# Patient Record
Sex: MALE | Race: WHITE | URBAN - METROPOLITAN AREA
[De-identification: names, ages, dates, MRNs, and addresses within clinical notes are randomized per-mention and may not be internally consistent; named-entity substitution may affect disease eponyms.]

---

## 2017-01-18 ENCOUNTER — OFFICE VISIT (OUTPATIENT)
Dept: FAMILY MEDICINE | Facility: CLINIC | Age: 82
End: 2017-01-18

## 2017-01-18 VITALS
HEART RATE: 83 BPM | WEIGHT: 151 LBS | DIASTOLIC BLOOD PRESSURE: 77 MMHG | OXYGEN SATURATION: 96 % | TEMPERATURE: 97.4 F | BODY MASS INDEX: 24.27 KG/M2 | SYSTOLIC BLOOD PRESSURE: 122 MMHG | RESPIRATION RATE: 18 BRPM

## 2017-01-18 DIAGNOSIS — F02.80 LATE ONSET ALZHEIMER'S DISEASE WITHOUT BEHAVIORAL DISTURBANCE (H): ICD-10-CM

## 2017-01-18 DIAGNOSIS — I10 ESSENTIAL HYPERTENSION: Primary | ICD-10-CM

## 2017-01-18 DIAGNOSIS — G30.1 LATE ONSET ALZHEIMER'S DISEASE WITHOUT BEHAVIORAL DISTURBANCE (H): ICD-10-CM

## 2017-01-18 DIAGNOSIS — E11.9 TYPE 2 DIABETES MELLITUS WITHOUT COMPLICATION, WITHOUT LONG-TERM CURRENT USE OF INSULIN (H): ICD-10-CM

## 2017-01-18 LAB
ANION GAP SERPL CALCULATED.3IONS-SCNC: 10 MMOL/L (ref 3–14)
BUN SERPL-MCNC: 21 MG/DL (ref 7–30)
CALCIUM SERPL-MCNC: 10 MG/DL (ref 8.5–10.1)
CHLORIDE SERPL-SCNC: 104 MMOL/L (ref 94–109)
CHOLEST SERPL-MCNC: 140.7 MG/DL (ref 0–200)
CHOLEST/HDLC SERPL: 3.7 {RATIO} (ref 0–5)
CO2 SERPL-SCNC: 27 MMOL/L (ref 20–32)
CREAT SERPL-MCNC: 0.76 MG/DL (ref 0.66–1.25)
CREAT UR-MCNC: 132 MG/DL
GFR SERPL CREATININE-BSD FRML MDRD: ABNORMAL ML/MIN/1.7M2
GLUCOSE SERPL-MCNC: 108 MG/DL (ref 70–99)
HBA1C MFR BLD: 6.4 % (ref 4.3–6)
HDLC SERPL-MCNC: 37.6 MG/DL
LDLC SERPL CALC-MCNC: 77 MG/DL (ref 0–129)
MICROALBUMIN UR-MCNC: 24 MG/L
MICROALBUMIN/CREAT UR: 18.33 MG/G CR (ref 0–17)
POTASSIUM SERPL-SCNC: 3.9 MMOL/L (ref 3.4–5.3)
SODIUM SERPL-SCNC: 141 MMOL/L (ref 133–144)
TRIGL SERPL-MCNC: 130.3 MG/DL (ref 0–150)
VLDL CHOLESTEROL: 26.1 MG/DL (ref 7–32)

## 2017-01-18 ASSESSMENT — ENCOUNTER SYMPTOMS
CONFUSION: 1
NERVOUS/ANXIOUS: 0
DYSPHORIC MOOD: 0
GASTROINTESTINAL NEGATIVE: 1
AGITATION: 0
UNEXPECTED WEIGHT CHANGE: 0
DECREASED CONCENTRATION: 0
RESPIRATORY NEGATIVE: 1
FATIGUE: 0
FEVER: 0
APPETITE CHANGE: 0
MYALGIAS: 0
ARTHRALGIAS: 1
CARDIOVASCULAR NEGATIVE: 1
ACTIVITY CHANGE: 0
WEAKNESS: 0

## 2017-01-19 NOTE — PROGRESS NOTES
HPI:       Nick Alegria is a 81 year old who presents for the following  Patient presents with:  Diabetes: F/u    F/u DM, HTN, AD.  Doing well.  Staying active.  C/o varying temperatures in feet -- they are cold most of the time but then he puts on 2 pairs of socks and goes outside to walk; then they become uncomfortably warm.  No burning in his feet at night.  No electric sensations or shooting pains.  Wife states he is doing well.         Problem, Medication and Allergy Lists were reviewed and are current.  Patient is an established patient of this clinic.         Review of Systems:   Review of Systems   Constitutional: Negative for fever, activity change, appetite change, fatigue and unexpected weight change.   Respiratory: Negative.    Cardiovascular: Negative.    Gastrointestinal: Negative.    Endocrine: Negative for heat intolerance and polyuria.   Musculoskeletal: Positive for arthralgias (foot pain). Negative for myalgias and gait problem.   Neurological: Negative for weakness.   Psychiatric/Behavioral: Positive for confusion. Negative for behavioral problems, dysphoric mood, decreased concentration and agitation. The patient is not nervous/anxious.              Physical Exam:   Patient Vitals for the past 24 hrs:   BP Temp Temp src Pulse Resp SpO2 Weight   01/18/17 1419 122/77 mmHg 97.4  F (36.3  C) Oral 83 18 96 % 151 lb (68.493 kg)     Body mass index is 24.27 kg/(m^2).  Vitals were reviewed and were normal     Physical Exam   Constitutional: He appears well-developed and well-nourished.   Neck: No thyromegaly present.   Cardiovascular: Normal rate, regular rhythm, normal heart sounds and intact distal pulses.    Pulmonary/Chest: Effort normal and breath sounds normal.   Musculoskeletal: He exhibits no edema.   Feet with onychomycosis, mild flaking distally, strong d pedis pulses.   Lymphadenopathy:     He has no cervical adenopathy.   Neurological: He is alert.   Skin: Skin is warm and dry.    Psychiatric: He has a normal mood and affect. His behavior is normal. Judgment and thought content normal.   Nursing note and vitals reviewed.        Results:     Results from last visit:  Office Visit on 08/31/2016   Component Date Value Ref Range Status     Specific Gravity Urine 08/31/2016 1.020  1.005 - 1.030 Final     pH Urine 08/31/2016 7.5  4.5 - 8.0 Final     Leukocyte Esterase UR 08/31/2016 Negative  NEGATIVE Final     Nitrite Urine 08/31/2016 Negative  NEGATIVE Final     Protein UR 08/31/2016 Negative  NEGATIVE Final     Glucose Urine 08/31/2016 Negative  NEGATIVE Final     Ketones Urine 08/31/2016 Negative  NEGATIVE Final     Urobilinogen mg/dL 08/31/2016 0.2 E.U./dL  0.2 E.U./dL Final     Bilirubin UR 08/31/2016 Negative  NEGATIVE Final     Blood UR 08/31/2016 Negative  NEGATIVE Final     Assessment and Plan     Nick was seen today for diabetes.    Diagnoses and all orders for this visit:    Essential hypertension - at goal continue Rx  -     Lipid Panel ([facility])    Type 2 diabetes mellitus without complication, without long-term current use of insulin (H) - well controlled, due for routine labs  -     Hemoglobin A1c  -     Basic metabolic panel  -     Albumin Random Urine Quantitative    Late onset Alzheimer's disease without behavioral disturbance - all functional needs met.  Continue donepezil.      There are no discontinued medications.  Options for treatment and follow-up care were reviewed with the patient. Nick Airam  engaged in the decision making process and verbalized understanding of the options discussed and agreed with the final plan.    Steffen Bingham MD

## 2017-01-20 DIAGNOSIS — E11.9 TYPE 2 DIABETES MELLITUS WITHOUT COMPLICATION, WITHOUT LONG-TERM CURRENT USE OF INSULIN (H): Primary | ICD-10-CM

## 2017-01-20 DIAGNOSIS — E11.9 TYPE 2 DIABETES MELLITUS WITHOUT COMPLICATION (H): ICD-10-CM

## 2017-01-20 RX ORDER — ATORVASTATIN CALCIUM 40 MG/1
40 TABLET, FILM COATED ORAL DAILY
Qty: 90 TABLET | Refills: 3 | Status: SHIPPED | OUTPATIENT
Start: 2017-01-20 | End: 2018-02-23

## 2017-01-20 NOTE — TELEPHONE ENCOUNTER
Request for medication refill:    Date of last visit at clinic: 01/18/17    Please complete refill if appropriate and CLOSE ENCOUNTER.    Closing the encounter signifies the refill is complete.    If refill has been denied, please complete the smart phrase .smirefuse and route it to the [facility] RN TRIAGE pool to inform the patient and the pharmacy.    Susie Reyes

## 2017-01-30 ENCOUNTER — TELEPHONE (OUTPATIENT)
Dept: FAMILY MEDICINE | Facility: CLINIC | Age: 82
End: 2017-01-30

## 2017-01-30 NOTE — TELEPHONE ENCOUNTER
[facility] phone call message- patient requesting a refill:    Full Medication Name: atorvastatin (LIPITOR) 40 MG tablet      [facility] - [place], [place] - [address] AT [facility]  [address]  [place] [address]  Phone: [phone] Fax: [phone]  : Yes    Additional Comments:  Patient is requesting refill of above medication.    OK to leave a message on voice mail? Yes    Primary language: English      needed? No    Call taken on January 30, 2017 at 11:50 AM by Hiral Vazquez

## 2017-02-02 NOTE — TELEPHONE ENCOUNTER
I called the pharmacy and spoke to the pharmacist to fill the Atorvistatin with refills.  They have the patient has Jaydon under the Medicare.

## 2017-02-06 ENCOUNTER — MYC MEDICAL ADVICE (OUTPATIENT)
Dept: FAMILY MEDICINE | Facility: CLINIC | Age: 82
End: 2017-02-06

## 2017-02-08 ENCOUNTER — OFFICE VISIT (OUTPATIENT)
Dept: FAMILY MEDICINE | Facility: CLINIC | Age: 82
End: 2017-02-08

## 2017-02-08 VITALS
HEART RATE: 78 BPM | BODY MASS INDEX: 23.79 KG/M2 | DIASTOLIC BLOOD PRESSURE: 77 MMHG | SYSTOLIC BLOOD PRESSURE: 128 MMHG | OXYGEN SATURATION: 97 % | WEIGHT: 148 LBS | RESPIRATION RATE: 18 BRPM | TEMPERATURE: 97.6 F

## 2017-02-08 DIAGNOSIS — F43.21 ADJUSTMENT DISORDER WITH DEPRESSED MOOD: Primary | ICD-10-CM

## 2017-02-08 ASSESSMENT — ENCOUNTER SYMPTOMS
RESPIRATORY NEGATIVE: 1
CONSTITUTIONAL NEGATIVE: 1
CARDIOVASCULAR NEGATIVE: 1

## 2017-02-08 NOTE — MR AVS SNAPSHOT
After Visit Summary   2/8/2017    Nick Alegria    MRN: [id number]           Patient Information     Date Of Birth          1935        Visit Information        Provider Department      2/8/2017 12:50 PM Steffen Bingham MD [facility]        Today's Diagnoses     Adjustment disorder with depressed mood    -  1       Care Instructions    1.  Continue exercising -- 4 times a week.    2.  Enjoy the trip.            Follow-ups after your visit        Follow-up notes from your care team     Return in about 5 weeks (around 3/15/2017).      Who to contact     Please call your clinic at [phone] to:    Ask questions about your health    Make or cancel appointments    Discuss your medicines    Learn about your test results    Speak to your doctor   If you have compliments or concerns about an experience at your clinic, or if you wish to file a complaint, please contact [facility] Patient Relations at [phone] or email us at [email or web address]         Additional Information About Your Visit        MyChart Information     [employer] gives you secure access to your electronic health record. If you see a primary care provider, you can also send messages to your care team and make appointments. If you have questions, please call your primary care clinic.  If you do not have a primary care provider, please call [phone] and they will assist you.      [employer] is an electronic gateway that provides easy, online access to your medical records. With [employer], you can request a clinic appointment, read your test results, renew a prescription or communicate with your care team.     To access your existing account, please contact your [facility] or call [phone] for assistance.        Care EveryWhere ID     This is your Care EveryWhere ID. This could be used by other organizations to access your [facility]  records  [id number]        Your Vitals Were     Pulse Temperature Respirations Pulse Oximetry          78 97.6  F (36.4  C) (Oral) 18 97%         Blood Pressure from Last 3 Encounters:   02/08/17 128/77   01/18/17 122/77   10/26/16 122/71    Weight from Last 3 Encounters:   02/08/17 148 lb (67.132 kg)   01/18/17 151 lb (68.493 kg)   10/31/16 150 lb 9.6 oz (68.312 kg)              Today, you had the following     No orders found for display       Primary Care Provider Office Phone # Fax #    Steffen Bingham [phone]        [facility] [address] [facility] [address]  [facility] [address]        Thank you!     Thank you for choosing [facility]  for your care. Our goal is always to provide you with excellent care. Hearing back from our patients is one way we can continue to improve our services. Please take a few minutes to complete the written survey that you may receive in the mail after your visit with us. Thank you!             Your Updated Medication List - Protect others around you: Learn how to safely use, store and throw away your medicines at www.disposemymeds.org.          This list is accurate as of: 2/8/17  1:13 PM.  Always use your most recent med list.                   Brand Name Dispense Instructions for use    amLODIPine 10 MG tablet    NORVASC    90 tablet    Take 1 tablet (10 mg) by mouth daily       atorvastatin 40 MG tablet    LIPITOR    90 tablet    Take 1 tablet (40 mg) by mouth daily       B-D ULTRA-FINE 33 LANCETS Misc     100 each    Use 2-4 times daily as directed       Bacitracin-Polymyx-Balwinder-HC 1 % Oint     1 Tube    Apply to affected areas  daily with dressing changes       blood glucose monitoring test strip    LEE CONTOUR    100 each    Use to test blood sugars 3 times daily or as directed.       calcium carbonate 500 MG tablet    OS-PEDRO PABLO 500 mg [place]. [place]     Take 500 mg by mouth 2 times daily       ciclopirox 0.77 % cream    LOPROX    90 g    Apply  topically 2 times daily To feet and toenails.       donepezil 10 MG tablet    ARICEPT    90 tablet    Take 1 tablet (10 mg) by mouth At Bedtime       exenatide ER 2 MG recon vial kit susp for weekly inj    BYDUREON    3 kit    Inject 2 mg Subcutaneous once a week       latanoprost 0.005 % ophthalmic solution    XALATAN     1 drop At Bedtime       losartan 100 MG tablet    COZAAR    90 tablet    Take 1 tablet (100 mg) by mouth daily       metFORMIN 500 MG tablet    GLUCOPHAGE    180 tablet    Take 1 tablet (500 mg) by mouth 2 times daily (with meals)       MULTIVITAMIN PO          salicylic acid 17 % Liqd     1 Bottle    Externally apply 1 dose * topically daily To left foot wart.       tolnaftate 1 % Aerp    ANTIFUNGAL SPRAY POWDER    130 g    Apply to feet twice daily       VITAMIN D3 PO      Take by mouth daily

## 2017-02-08 NOTE — PROGRESS NOTES
HPI:       Nick Alegria is a 81 year old who presents for the following  Patient presents with:  Depression: Follow Up    Depressed mood with some anger and grumpiness the past 4 wks.  Hasn't been a big problem in the past.  No h/o depression.  Was able to swim 2 days ago and mood is better since then.  He had been unable to swim for several weeks due to shoulder injury.         Problem, Medication and Allergy Lists were reviewed and are current.  Patient is an established patient of this clinic.         Review of Systems:   Review of Systems   Constitutional: Negative.    Respiratory: Negative.    Cardiovascular: Negative.              Physical Exam:   Patient Vitals for the past 24 hrs:   BP Temp Temp src Pulse Resp SpO2 Weight   02/08/17 1256 128/77 mmHg 97.6  F (36.4  C) Oral 78 18 97 % 148 lb (67.132 kg)     Body mass index is 23.79 kg/(m^2).  Vitals were reviewed and were normal     Physical Exam   Constitutional: He appears well-developed and well-nourished. No distress.   alert   Cardiovascular: Normal rate and regular rhythm.    Pulmonary/Chest: Effort normal and breath sounds normal. No respiratory distress.   Psychiatric: He has a normal mood and affect. His behavior is normal.         Results:       Assessment and Plan     Nick was seen today for depression.    Diagnoses and all orders for this visit:    Adjustment disorder with depressed mood - not quite a level to formally diagnose him.  Advised resumption of usual exercise.  Wife will monitor his mood and report back at next visit.  They are going on vacation next week in [place].      There are no discontinued medications.  Options for treatment and follow-up care were reviewed with the patient. Nikc Alegria  engaged in the decision making process and verbalized understanding of the options discussed and agreed with the final plan.    Steffen Bingham MD

## 2017-02-09 ASSESSMENT — PATIENT HEALTH QUESTIONNAIRE - PHQ9: SUM OF ALL RESPONSES TO PHQ QUESTIONS 1-9: 7

## 2017-03-22 ENCOUNTER — OFFICE VISIT (OUTPATIENT)
Dept: FAMILY MEDICINE | Facility: CLINIC | Age: 82
End: 2017-03-22

## 2017-03-22 VITALS
HEART RATE: 75 BPM | BODY MASS INDEX: 24.3 KG/M2 | TEMPERATURE: 97.4 F | OXYGEN SATURATION: 99 % | DIASTOLIC BLOOD PRESSURE: 76 MMHG | HEIGHT: 66 IN | SYSTOLIC BLOOD PRESSURE: 133 MMHG | RESPIRATION RATE: 18 BRPM | WEIGHT: 151.2 LBS

## 2017-03-22 DIAGNOSIS — E11.9 TYPE 2 DIABETES MELLITUS WITHOUT COMPLICATION, WITHOUT LONG-TERM CURRENT USE OF INSULIN (H): ICD-10-CM

## 2017-03-22 DIAGNOSIS — I10 ESSENTIAL HYPERTENSION: Primary | ICD-10-CM

## 2017-03-22 DIAGNOSIS — F02.80 LATE ONSET ALZHEIMER'S DISEASE WITHOUT BEHAVIORAL DISTURBANCE (H): ICD-10-CM

## 2017-03-22 DIAGNOSIS — G30.1 LATE ONSET ALZHEIMER'S DISEASE WITHOUT BEHAVIORAL DISTURBANCE (H): ICD-10-CM

## 2017-03-22 ASSESSMENT — ENCOUNTER SYMPTOMS
APPETITE CHANGE: 0
UNEXPECTED WEIGHT CHANGE: 0
DECREASED CONCENTRATION: 0
ARTHRALGIAS: 1
RESPIRATORY NEGATIVE: 1
NERVOUS/ANXIOUS: 0
AGITATION: 0
WEAKNESS: 0
GASTROINTESTINAL NEGATIVE: 1
FATIGUE: 0
ACTIVITY CHANGE: 0
CONFUSION: 1
FEVER: 0
DYSPHORIC MOOD: 0
CARDIOVASCULAR NEGATIVE: 1
MYALGIAS: 0

## 2017-03-22 NOTE — PROGRESS NOTES
"      HPI:       Jaydon Alegria is a 81 year old who presents for the following  Patient presents with:  RECHECK: Follow up, depression  Diabetes: pt has concerns about Blood sugar 90 -201    F/U AD, HTN, DM, depressed mood.  Just returned from vacation and feels great.  Thinks his mood is OK.  Wife feels it is OK as well.  His children are concerned that his memory is worse.    He has occasional lightheadedness when he gets up in the morning.  No falls.         Problem, Medication and Allergy Lists were reviewed and are current.  Patient is an established patient of this clinic.         Review of Systems:   Review of Systems   Constitutional: Negative for activity change, appetite change, fatigue, fever and unexpected weight change.   Respiratory: Negative.    Cardiovascular: Negative.    Gastrointestinal: Negative.    Endocrine: Negative for heat intolerance and polyuria.   Musculoskeletal: Positive for arthralgias (foot pain - \"hot\"). Negative for gait problem and myalgias.   Neurological: Negative for weakness.   Psychiatric/Behavioral: Positive for confusion. Negative for agitation, behavioral problems, decreased concentration and dysphoric mood. The patient is not nervous/anxious.              Physical Exam:   Patient Vitals for the past 24 hrs:   BP Temp Pulse Resp SpO2 Height Weight   03/22/17 1412 133/76 97.4  F (36.3  C) 75 18 99 % 5' 6.14\" (168 cm) 151 lb 3.2 oz (68.6 kg)     Body mass index is 24.3 kg/(m^2).  Vitals were reviewed and were normal     Physical Exam   Constitutional: He appears well-developed and well-nourished.   Neck: No thyromegaly present.   Cardiovascular: Normal rate, regular rhythm, normal heart sounds and intact distal pulses.    Pulmonary/Chest: Effort normal and breath sounds normal.   Musculoskeletal: He exhibits no edema.   Feet with onychomycosis, mild flaking distally, strong d pedis pulses.   Lymphadenopathy:     He has no cervical adenopathy.   Neurological: He " is alert.   Skin: Skin is warm and dry.   Psychiatric: He has a normal mood and affect. His behavior is normal. Judgment and thought content normal.   Nursing note and vitals reviewed.        Results:     Results from last visit:  Office Visit on 01/18/2017   Component Date Value Ref Range Status     Cholesterol 01/18/2017 140.7  0.0 - 200.0 mg/dL Final     Cholesterol/HDL Ratio 01/18/2017 3.7  0.0 - 5.0 Final     HDL Cholesterol 01/18/2017 37.6* >40.0 mg/dL Final     LDL Cholesterol Calculated 01/18/2017 77  0 - 129 mg/dL Final     Triglycerides 01/18/2017 130.3  0.0 - 150.0 mg/dL Final     VLDL Cholesterol 01/18/2017 26.1  7.0 - 32.0 mg/dL Final     Hemoglobin A1C 01/18/2017 6.4* 4.3 - 6.0 % Final     Sodium 01/18/2017 141  133 - 144 mmol/L Final     Potassium 01/18/2017 3.9  3.4 - 5.3 mmol/L Final     Chloride 01/18/2017 104  94 - 109 mmol/L Final     Carbon Dioxide 01/18/2017 27  20 - 32 mmol/L Final     Anion Gap 01/18/2017 10  3 - 14 mmol/L Final     Glucose 01/18/2017 108* 70 - 99 mg/dL Final     Urea Nitrogen 01/18/2017 21  7 - 30 mg/dL Final     Creatinine 01/18/2017 0.76  0.66 - 1.25 mg/dL Final     GFR Estimate 01/18/2017   >60 mL/min/1.7m2 Final                    Value:>90  Non  GFR Calc       GFR Estimate If Black 01/18/2017   >60 mL/min/1.7m2 Final                    Value:>90   GFR Calc       Calcium 01/18/2017 10.0  8.5 - 10.1 mg/dL Final     Creatinine Urine 01/18/2017 132  mg/dL Final     Albumin Urine mg/L 01/18/2017 24  mg/L Final     Albumin Urine mg/g Cr 01/18/2017 18.33* 0 - 17 mg/g Cr Final     Assessment and Plan     Jaydon Romero was seen today for recheck and diabetes.    Diagnoses and all orders for this visit:    Essential hypertension - at goal.  LIghtheadedness in the morning a possible indicator of overtreatment.  They will obtain a BP cuff and get some early morning readings.    Late onset Alzheimer's disease without behavioral disturbance - some  concern about deterioration from children.  Discussed adding memantine to regimen with pt and wife, they would like to think about it.    Type 2 diabetes mellitus without complication, without long-term current use of insulin (H) - stable, continue  Current Rx.      There are no discontinued medications.  Options for treatment and follow-up care were reviewed with the patient. Jaydon Alegria  engaged in the decision making process and verbalized understanding of the options discussed and agreed with the final plan.    Steffen Bingham MD

## 2017-03-22 NOTE — MR AVS SNAPSHOT
After Visit Summary   3/22/2017    Jaydon Alegria    MRN: [id number]           Patient Information     Date Of Birth          1935        Visit Information        Provider Department      3/22/2017 2:00 PM Steffen Bingham MD [facility]        Today's Diagnoses     Essential hypertension    -  1    Late onset Alzheimer's disease without behavioral disturbance        Type 2 diabetes mellitus without complication, without long-term current use of insulin (H)          Care Instructions    1.  Purchase BP machine.    2.  Take BP in the morning 3 times when you feel fine.  Write down the value.    3.  Take BP in the morning when you feel lightheaded and write it down.          Follow-ups after your visit        Follow-up notes from your care team     Return in about 4 weeks (around 4/19/2017).      Your next 10 appointments already scheduled     Mar 27, 2017  1:40 PM CDT   (Arrive by 1:25 PM)   RETURN FOOT/ANKLE with Garfield Fisher DPM   [facility] ([facility])    [address] [place] [id number] [phone]              Who to contact     Please call your clinic at [phone] to:    Ask questions about your health    Make or cancel appointments    Discuss your medicines    Learn about your test results    Speak to your doctor   If you have compliments or concerns about an experience at your clinic, or if you wish to file a complaint, please contact [facility] Patient Relations at [phone] or email us at [email or web address]         Additional Information About Your Visit        MyChart Information     [employer] gives you secure access to your electronic health record. If you see a primary care provider, you can also send messages to your care team and make appointments. If you have questions, please call your primary care clinic.  If you do not have a primary  "care provider, please call [phone] and they will assist you.      [employer] is an electronic gateway that provides easy, online access to your medical records. With [employer], you can request a clinic appointment, read your test results, renew a prescription or communicate with your care team.     To access your existing account, please contact your [facility] or call [phone] for assistance.        Care EveryWhere ID     This is your Care EveryWhere ID. This could be used by other organizations to access your [place] medical records  [id number]        Your Vitals Were     Pulse Temperature Respirations Height Pulse Oximetry BMI (Body Mass Index)    75 97.4  F (36.3  C) 18 5' 6.14\" (168 cm) 99% 24.3 kg/m2       Blood Pressure from Last 3 Encounters:   03/22/17 133/76   02/08/17 128/77   01/18/17 122/77    Weight from Last 3 Encounters:   03/22/17 151 lb 3.2 oz (68.6 kg)   02/08/17 148 lb (67.1 kg)   01/18/17 151 lb (68.5 kg)              Today, you had the following     No orders found for display       Primary Care Provider Office Phone # Fax #    Steffen Bingham [phone]        [facility] [address] [address]        Thank you!     Thank you for choosing [facility]  for your care. Our goal is always to provide you with excellent care. Hearing back from our patients is one way we can continue to improve our services. Please take a few minutes to complete the written survey that you may receive in the mail after your visit with us. Thank you!             Your Updated Medication List - Protect others around you: Learn how to safely use, store and throw away your medicines at www.disposemymeds.org.          This list is accurate as of: 3/22/17  2:56 PM.  Always use your most recent med list.                   Brand Name Dispense Instructions for use    amLODIPine 10 MG tablet    NORVASC    90 tablet    Take 1 tablet " (10 mg) by mouth daily       atorvastatin 40 MG tablet    LIPITOR    90 tablet    Take 1 tablet (40 mg) by mouth daily       B-D ULTRA-FINE 33 LANCETS Misc     100 each    Use 2-4 times daily as directed       Bacitracin-Polymyx-Balwinder-HC 1 % Oint     1 Tube    Apply to affected areas  daily with dressing changes       blood glucose monitoring test strip    LEE CONTOUR    100 each    Use to test blood sugars 3 times daily or as directed.       calcium carbonate 500 MG tablet    OS-PEDRO PABLO 500 mg [place]. [place]     Take 500 mg by mouth 2 times daily       * ciclopirox 0.77 % cream    LOPROX    90 g    Apply topically 2 times daily To feet and toenails.       * ciclopirox 0.77 % cream    LOPROX    270 g    Apply topically 2 times daily       donepezil 10 MG tablet    ARICEPT    90 tablet    Take 1 tablet (10 mg) by mouth At Bedtime       exenatide ER 2 MG recon vial kit susp for weekly inj    BYDUREON    3 kit    Inject 2 mg Subcutaneous once a week       latanoprost 0.005 % ophthalmic solution    XALATAN     1 drop At Bedtime       losartan 100 MG tablet    COZAAR    90 tablet    Take 1 tablet (100 mg) by mouth daily       metFORMIN 500 MG tablet    GLUCOPHAGE    180 tablet    Take 1 tablet (500 mg) by mouth 2 times daily (with meals)       MULTIVITAMIN PO          salicylic acid 17 % Liqd     1 Bottle    Externally apply 1 dose * topically daily To left foot wart.       tolnaftate 1 % Aerp    ANTIFUNGAL SPRAY POWDER    130 g    Apply to feet twice daily       VITAMIN D3 PO      Take by mouth daily       * Notice:  This list has 2 medication(s) that are the same as other medications prescribed for you. Read the directions carefully, and ask your doctor or other care provider to review them with you.

## 2017-03-22 NOTE — PATIENT INSTRUCTIONS
1.  Purchase BP machine.    2.  Take BP in the morning 3 times when you feel fine.  Write down the value.    3.  Take BP in the morning when you feel lightheaded and write it down.

## 2017-03-27 ENCOUNTER — OFFICE VISIT (OUTPATIENT)
Dept: ORTHOPEDICS | Facility: CLINIC | Age: 82
End: 2017-03-27

## 2017-03-27 DIAGNOSIS — E11.49 DIABETIC NEUROPATHY WITH NEUROLOGIC COMPLICATION (H): ICD-10-CM

## 2017-03-27 DIAGNOSIS — B07.0 PLANTAR WARTS: ICD-10-CM

## 2017-03-27 DIAGNOSIS — L84 TYLOMA: Primary | ICD-10-CM

## 2017-03-27 DIAGNOSIS — E11.40 DIABETIC NEUROPATHY WITH NEUROLOGIC COMPLICATION (H): ICD-10-CM

## 2017-03-27 RX ORDER — CAPSAICIN 0.025 %
CREAM (GRAM) TOPICAL
Qty: 60 G | Refills: 5 | Status: SHIPPED | OUTPATIENT
Start: 2017-03-27 | End: 2019-03-20

## 2017-03-27 NOTE — LETTER
3/27/2017       RE: Jaydon Alegria  [address]  [place] [address]     Dear Colleague,    Thank you for referring your patient, Jaydon Alegria, to the [facility] at [facility]. Please see a copy of my visit note below.    Past Medical History:   Diagnosis Date     Depressive disorder      Diabetes (H)      Hypertension      Skin cancer 8/25/2016     Patient Active Problem List   Diagnosis     Essential hypertension     Peripheral neuropathy (H)     HL (hearing loss), bilateral     Glaucoma of both eyes     Hyperlipidemia LDL goal <130     Chronic constipation     Late onset Alzheimer's disease without behavioral disturbance     Type 2 diabetes mellitus without complication (H)     Aortic stenosis     ACP (advance care planning)     Benign essential hypertension     Type 2 diabetes mellitus without complication, without long-term current use of insulin (H)     Past Surgical History:   Procedure Laterality Date     CHOLECYSTECTOMY       EXCISE LESION EYELID Right 4/26/2016    Procedure: EXCISE LESION EYELID;  Surgeon: Abelino Pichardo MD;  Location: [facility]     HEAD & NECK SURGERY       ORTHOPEDIC SURGERY      shoulder right      Social History     Social History     Marital status:      Spouse name: N/A     Number of children: N/A     Years of education: N/A     Occupational History     Not on file.     Social History Main Topics     Smoking status: Former Smoker     Smokeless tobacco: Never Used     Alcohol use Yes      Comment: occasionally     Drug use: No     Sexual activity: Not Currently     Other Topics Concern     Not on file     Social History Narrative     Family History   Problem Relation Age of Onset     Other Cancer Sister      DIABETES No family hx of      Coronary Artery Disease No family hx of      Hypertension No family hx of      Hyperlipidemia No family hx of      CEREBROVASCULAR  DISEASE No family hx of      Breast Cancer No family hx of      Colon Cancer No family hx of      Prostate Cancer No family hx of      Depression No family hx of      Anxiety Disorder No family hx of      Substance Abuse No family hx of      MENTAL ILLNESS No family hx of      SUBJECTIVE FINDINGS:  A 81-year-old male returns to clinic.  He relates he is doing okay.  He is using the Loprox cream on his toes and any cracked areas on his skin.  He relates he gets burning on his plantar left foot and it does not hurt, it just burns.  Relates that everything else is doing pretty good.  He has a plantar wart and he has not been treating that.      OBJECTIVE FINDINGS:  DP and PT are 2/4 bilaterally.  Sharp/dull is decreased with 5.07 [place] monofilament bilaterally.  He has distally contracted digits, 2-5 bilaterally.  He has some hyperkeratotic tissue buildup on the plantar fifth MPJ bilaterally.  He has a plantar left fifth sulcus hyperkeratotic tissue buildup with pinpoint ecchymosis.  There is no erythema, no drainage, no odor, no calor bilaterally.  He has decreased range of motion bilaterally.  Deep tendon reflexes are intact bilaterally.      ASSESSMENT AND PLAN:  Plantar wart, left fifth MPJ area.  Tinea pedis is doing well.  Plantar fasciitis is doing well.  He still has onychomycosis.  He is diabetic with peripheral neuropathy.  I am going to continue the Loprox.  Diagnosis and treatment options discussed with him.  I gave him a prescription for diabetic shoes and inserts and gave him the phone number and address to the [facility] to pick those up.  The plantar wart on the left fifth MPJ was frozen with liquid nitrogen x3 upon consent and use discussed with him.  I will see him back in about 2-4 weeks.  Continue the Loprox cream.  A prescription for capsaicin cream given and use discussed with him.         Again, thank you for allowing me to participate in the care of your patient.       Sincerely,    Garfield Fisher DPM

## 2017-03-27 NOTE — NURSING NOTE
Reason For Visit:   Chief Complaint   Patient presents with     RECHECK     Bunion, Left Foot. Pt's wife stated that the ciclopirox does not solve the burning sensation in the pt's feet.        Pain Assessment  Patient Currently in Pain: Denies (Pt stated no pain but but he has burning in the feet. )           Current Outpatient Prescriptions   Medication Sig Dispense Refill     ciclopirox (LOPROX) 0.77 % cream Apply topically 2 times daily 270 g 2     atorvastatin (LIPITOR) 40 MG tablet Take 1 tablet (40 mg) by mouth daily 90 tablet 3     exenatide ER (BYDUREON) 2 MG recon vial kit susp for weekly inj Inject 2 mg Subcutaneous once a week 3 kit 3     blood glucose monitoring ([employer] CONTOUR) test strip Use to test blood sugars 3 times daily or as directed. 100 each 11     salicylic acid 17 % LIQD Externally apply 1 dose * topically daily To left foot wart. 1 Bottle 3     ciclopirox (LOPROX) 0.77 % cream Apply topically 2 times daily To feet and toenails. 90 g 6     B-D ULTRA-FINE 33 LANCETS MISC Use 2-4 times daily as directed 100 each 12     donepezil (ARICEPT) 10 MG tablet Take 1 tablet (10 mg) by mouth At Bedtime 90 tablet 3     tolnaftate (ANTIFUNGAL SPRAY POWDER) 1 % AERP Apply to feet twice daily 130 g 3     Bacitracin-Polymyx-Balwinder-HC 1 % OINT Apply to affected areas  daily with dressing changes 1 Tube 1     metFORMIN (GLUCOPHAGE) 500 MG tablet Take 1 tablet (500 mg) by mouth 2 times daily (with meals) 180 tablet 3     amLODIPine (NORVASC) 10 MG tablet Take 1 tablet (10 mg) by mouth daily 90 tablet 3     losartan (COZAAR) 100 MG tablet Take 1 tablet (100 mg) by mouth daily 90 tablet 3     latanoprost (XALATAN) 0.005 % ophthalmic solution 1 drop At Bedtime       Multiple Vitamins-Minerals (MULTIVITAMIN PO)        calcium carbonate (OS-PEDRO PABLO 500 MG [employer]. [place]) 500 MG tablet Take 500 mg by mouth 2 times daily       Cholecalciferol (VITAMIN D3 PO) Take by mouth daily            Allergies   Allergen Reactions     No  Clinical Screening - See Comments      PN: ZAHIRA CM1: >>> NO CONTRAST ADVERSE REACTION <<<     Reaction :

## 2017-03-27 NOTE — MR AVS SNAPSHOT
After Visit Summary   3/27/2017    Jaydon Alegria    MRN: [id number]           Patient Information     Date Of Birth          1935        Visit Information        Provider Department      3/27/2017 1:40 PM Garfield Fisher DPM [facility]        Today's Diagnoses     Tyloma    -  1    Plantar warts        Diabetic neuropathy with neurologic complication (H)           Follow-ups after your visit        Additional Services     ORTHOTICS REFERRAL       *This referral order prints off in the [facility]  (Orthotics & Prosthetics) Central Scheduling Office**    The [facility] Central Scheduling Staff will contact the patient to schedule appointments.     Central Scheduling Contact Information: [phone] ([place])    Diabetic shoes.   Custom diabetic foot orthototics.        Please be aware that coverage of these services is subject to the terms and limitations of your health insurance plan.  Call member services at your health plan with any benefit or coverage questions.      Please bring the following to your appointment:    >>   Any x-rays, CTs or MRIs which have been performed.  Contact the facility where they were done to arrange for  prior to your scheduled appointment.    >>   List of current medications   >>   This referral request   >>   Any documents/labs given to you for this referral                  Your next 10 appointments already scheduled     Apr 19, 2017  2:00 PM CDT   Return Visit with [facility] ([facility])    [facility] [address] [place] [address],  [address]  [place] [address]   [phone]              Who to contact     Please call your clinic at [phone] to:    Ask questions about your health    Make or cancel appointments    Discuss your medicines    Learn about your test results    Speak to your doctor   If you have compliments or concerns about an experience at your clinic,  or if you wish to file a complaint, please contact [facility] Patient Relations at [phone] or email us at [email or web address]         Additional Information About Your Visit        [facility] Information     [facility] gives you secure access to your electronic health record. If you see a primary care provider, you can also send messages to your care team and make appointments. If you have questions, please call your primary care clinic.  If you do not have a primary care provider, please call [phone] and they will assist you.      [facility] is an electronic gateway that provides easy, online access to your medical records. With [facility], you can request a clinic appointment, read your test results, renew a prescription or communicate with your care team.     To access your existing account, please contact your [facility] or call [phone] for assistance.        Care EveryWhere ID     This is your Care EveryWhere ID. This could be used by other organizations to access your [place] medical records  [id number]         Blood Pressure from Last 3 Encounters:   03/22/17 133/76   02/08/17 128/77   01/18/17 122/77    Weight from Last 3 Encounters:   03/22/17 68.6 kg (151 lb 3.2 oz)   02/08/17 67.1 kg (148 lb)   01/18/17 68.5 kg (151 lb)              We Performed the Following     DESTRUCT BENIGN LESION, UP TO 14     ORTHOTICS REFERRAL          Today's Medication Changes          These changes are accurate as of: 3/27/17 11:59 PM.  If you have any questions, ask your nurse or doctor.               Start taking these medicines.        Dose/Directions    capsaicin 0.025 % Crea cream   Commonly known as:  ZOSTRIX   Used for:  Diabetic neuropathy with neurologic complication (H)   Started by:  Garfield Fisher DPM        To affected area 2-3 times daily as needed.   Quantity:  60 g   Refills:  5            Where to get your medicines      These  medications were sent to [facility] [address] - [place], [place] - [address]  [address], [facility] [address]     Phone:  [phone]     capsaicin 0.025 % Crea cream                Primary Care Provider Office Phone # Fax #    Steffen Bingham [phone]        [facility] [address]  [facility] [address]        Thank you!     Thank you for choosing [facility]  for your care. Our goal is always to provide you with excellent care. Hearing back from our patients is one way we can continue to improve our services. Please take a few minutes to complete the written survey that you may receive in the mail after your visit with us. Thank you!             Your Updated Medication List - Protect others around you: Learn how to safely use, store and throw away your medicines at www.disposemymeds.org.          This list is accurate as of: 3/27/17 11:59 PM.  Always use your most recent med list.                   Brand Name Dispense Instructions for use    amLODIPine 10 MG tablet    NORVASC    90 tablet    Take 1 tablet (10 mg) by mouth daily       atorvastatin 40 MG tablet    LIPITOR    90 tablet    Take 1 tablet (40 mg) by mouth daily       B-D ULTRA-FINE 33 LANCETS Misc     100 each    Use 2-4 times daily as directed       Bacitracin-Polymyx-Balwinder-HC 1 % Oint     1 Tube    Apply to affected areas  daily with dressing changes       blood glucose monitoring test strip    LEE CONTOUR    100 each    Use to test blood sugars 3 times daily or as directed.       calcium carbonate 500 MG tablet    OS-PEDRO PABLO 500 mg [employer] [place] [place]     Take 500 mg by mouth 2 times daily       capsaicin 0.025 % Crea cream    ZOSTRIX    60 g    To affected area 2-3 times daily as needed.       * ciclopirox 0.77 % cream    LOPROX    90 g    Apply topically 2 times daily To feet and toenails.       * ciclopirox 0.77 % cream    LOPROX    270 g    Apply topically 2 times daily       donepezil 10 MG  tablet    ARICEPT    90 tablet    Take 1 tablet (10 mg) by mouth At Bedtime       exenatide ER 2 MG recon vial kit susp for weekly inj    BYDUREON    3 kit    Inject 2 mg Subcutaneous once a week       latanoprost 0.005 % ophthalmic solution    XALATAN     1 drop At Bedtime       losartan 100 MG tablet    COZAAR    90 tablet    Take 1 tablet (100 mg) by mouth daily       metFORMIN 500 MG tablet    GLUCOPHAGE    180 tablet    Take 1 tablet (500 mg) by mouth 2 times daily (with meals)       MULTIVITAMIN PO          salicylic acid 17 % Liqd     1 Bottle    Externally apply 1 dose * topically daily To left foot wart.       tolnaftate 1 % Aerp    ANTIFUNGAL SPRAY POWDER    130 g    Apply to feet twice daily       VITAMIN D3 PO      Take by mouth daily       * Notice:  This list has 2 medication(s) that are the same as other medications prescribed for you. Read the directions carefully, and ask your doctor or other care provider to review them with you.

## 2017-03-27 NOTE — PROGRESS NOTES
Past Medical History:   Diagnosis Date     Depressive disorder      Diabetes (H)      Hypertension      Skin cancer 8/25/2016     Patient Active Problem List   Diagnosis     Essential hypertension     Peripheral neuropathy (H)     HL (hearing loss), bilateral     Glaucoma of both eyes     Hyperlipidemia LDL goal <130     Chronic constipation     Late onset Alzheimer's disease without behavioral disturbance     Type 2 diabetes mellitus without complication (H)     Aortic stenosis     ACP (advance care planning)     Benign essential hypertension     Type 2 diabetes mellitus without complication, without long-term current use of insulin (H)     Past Surgical History:   Procedure Laterality Date     CHOLECYSTECTOMY       EXCISE LESION EYELID Right 4/26/2016    Procedure: EXCISE LESION EYELID;  Surgeon: Abelino Pichardo MD;  Location: [facility]     HEAD & NECK SURGERY       ORTHOPEDIC SURGERY      shoulder right      Social History     Social History     Marital status:      Spouse name: N/A     Number of children: N/A     Years of education: N/A     Occupational History     Not on file.     Social History Main Topics     Smoking status: Former Smoker     Smokeless tobacco: Never Used     Alcohol use Yes      Comment: occasionally     Drug use: No     Sexual activity: Not Currently     Other Topics Concern     Not on file     Social History Narrative     Family History   Problem Relation Age of Onset     Other Cancer Sister      DIABETES No family hx of      Coronary Artery Disease No family hx of      Hypertension No family hx of      Hyperlipidemia No family hx of      CEREBROVASCULAR DISEASE No family hx of      Breast Cancer No family hx of      Colon Cancer No family hx of      Prostate Cancer No family hx of      Depression No family hx of      Anxiety Disorder No family hx of      Substance Abuse No family hx of      MENTAL ILLNESS No family hx of      SUBJECTIVE FINDINGS:  A 81-year-old male returns to  clinic.  He relates he is doing okay.  He is using the Loprox cream on his toes and any cracked areas on his skin.  He relates he gets burning on his plantar left foot and it does not hurt, it just burns.  Relates that everything else is doing pretty good.  He has a plantar wart and he has not been treating that.      OBJECTIVE FINDINGS:  DP and PT are 2/4 bilaterally.  Sharp/dull is decreased with 5.07 [place]-Angeline monofilament bilaterally.  He has distally contracted digits, 2-5 bilaterally.  He has some hyperkeratotic tissue buildup on the plantar fifth MPJ bilaterally.  He has a plantar left fifth sulcus hyperkeratotic tissue buildup with pinpoint ecchymosis.  There is no erythema, no drainage, no odor, no calor bilaterally.  He has decreased range of motion bilaterally.  Deep tendon reflexes are intact bilaterally.      ASSESSMENT AND PLAN:  Plantar wart, left fifth MPJ area.  Tinea pedis is doing well.  Plantar fasciitis is doing well.  He still has onychomycosis.  He is diabetic with peripheral neuropathy.  I am going to continue the Loprox.  Diagnosis and treatment options discussed with him.  I gave him a prescription for diabetic shoes and inserts and gave him the phone number and address to the [facility] to pick those up.  The plantar wart on the left fifth MPJ was frozen with liquid nitrogen x3 upon consent and use discussed with him.  I will see him back in about 2-4 weeks.  Continue the Loprox cream.  A prescription for capsaicin cream given and use discussed with him.

## 2017-04-04 ENCOUNTER — MEDICAL CORRESPONDENCE (OUTPATIENT)
Dept: HEALTH INFORMATION MANAGEMENT | Facility: CLINIC | Age: 82
End: 2017-04-04

## 2017-04-05 ENCOUNTER — MEDICAL CORRESPONDENCE (OUTPATIENT)
Dept: HEALTH INFORMATION MANAGEMENT | Facility: CLINIC | Age: 82
End: 2017-04-05

## 2017-04-18 ENCOUNTER — DOCUMENTATION ONLY (OUTPATIENT)
Dept: FAMILY MEDICINE | Facility: CLINIC | Age: 82
End: 2017-04-18

## 2017-04-19 ENCOUNTER — OFFICE VISIT (OUTPATIENT)
Dept: FAMILY MEDICINE | Facility: CLINIC | Age: 82
End: 2017-04-19

## 2017-04-19 VITALS
SYSTOLIC BLOOD PRESSURE: 126 MMHG | OXYGEN SATURATION: 99 % | BODY MASS INDEX: 24.49 KG/M2 | WEIGHT: 152.4 LBS | HEART RATE: 71 BPM | DIASTOLIC BLOOD PRESSURE: 75 MMHG | TEMPERATURE: 97.4 F | RESPIRATION RATE: 14 BRPM | HEIGHT: 66 IN

## 2017-04-19 DIAGNOSIS — I10 ESSENTIAL HYPERTENSION: ICD-10-CM

## 2017-04-19 DIAGNOSIS — G30.1 LATE ONSET ALZHEIMER'S DISEASE WITHOUT BEHAVIORAL DISTURBANCE (H): Primary | ICD-10-CM

## 2017-04-19 DIAGNOSIS — R42 LIGHTHEADEDNESS: ICD-10-CM

## 2017-04-19 DIAGNOSIS — F02.80 LATE ONSET ALZHEIMER'S DISEASE WITHOUT BEHAVIORAL DISTURBANCE (H): Primary | ICD-10-CM

## 2017-04-19 RX ORDER — MEMANTINE HYDROCHLORIDE 5 MG/1
5 TABLET ORAL 2 TIMES DAILY
Qty: 60 TABLET | Refills: 1 | Status: SHIPPED | OUTPATIENT
Start: 2017-04-19 | End: 2017-06-21 | Stop reason: DRUGHIGH

## 2017-04-19 ASSESSMENT — ENCOUNTER SYMPTOMS
FEVER: 0
RESPIRATORY NEGATIVE: 1
WEAKNESS: 0
AGITATION: 0
CARDIOVASCULAR NEGATIVE: 1
UNEXPECTED WEIGHT CHANGE: 0
DYSPHORIC MOOD: 0
FATIGUE: 0
NERVOUS/ANXIOUS: 0
CONFUSION: 1
DECREASED CONCENTRATION: 0
GASTROINTESTINAL NEGATIVE: 1
APPETITE CHANGE: 0
ACTIVITY CHANGE: 0

## 2017-04-19 NOTE — PATIENT INSTRUCTIONS
Here is the plan from today's visit    1. Late onset Alzheimer's disease without behavioral disturbance  Start medication twice daily 5 mg tablets  - memantine (NAMENDA) 5 MG tablet; Take 1 tablet (5 mg) by mouth 2 times daily  Dispense: 60 tablet; Refill: 1    2. Lightheadedness  Stop the bydureon injections for 2 weeks.  Note the light headed spells in diary.  If light headedness stops, STOP injections altogether.     3. Essential hypertension            Please call or return to clinic if your symptoms don't go away.    Follow up plan  Please make a clinic appointment for follow up with me (MARTHA JUSTICE) in 5-6   weeks for follow up on medication change.    Thank you for coming to [facility] today.  Lab Testing:  **If you had lab testing today and your results are reassuring or normal they will be mailed to you or sent through [facility] within 7 days.   **If the lab tests need quick action we will call you with the results.  The phone number we will call with results is # [phone] (home) none (work). If this is not the best number please call our clinic and change the number.  Medication Refills:  If you need any refills please call your pharmacy and they will contact us.   If you need to  your refill at a new pharmacy, please contact the new pharmacy directly. The new pharmacy will help you get your medications transferred faster.   Scheduling:  If you have any concerns about today's visit or wish to schedule another appointment please call our office during normal business hours [phone] (8-5:00 M-F)  If a referral was made to a [facility] and you don't get a call from central scheduling please call [phone].  If a Mammogram was ordered for you at [facility] call [phone] to schedule or change your appointment.  If you had an XRay/CT/Ultrasound/MRI ordered the number is [phone] to schedule or change your radiology appointment.   Medical Concerns:  If  you have urgent medical concerns please call [phone] at any time of the day.  If you have a medical emergency please call [phone].

## 2017-04-19 NOTE — MR AVS SNAPSHOT
After Visit Summary   4/19/2017    Jaydon Alegria    MRN: [id number]           Patient Information     Date Of Birth          1935        Visit Information        Provider Department      4/19/2017 2:00 PM Martha Bingham MD [facility]        Today's Diagnoses     Late onset Alzheimer's disease without behavioral disturbance    -  1    Lightheadedness        Essential hypertension          Care Instructions    Here is the plan from today's visit    1. Late onset Alzheimer's disease without behavioral disturbance  Start medication twice daily 5 mg tablets  - memantine (NAMENDA) 5 MG tablet; Take 1 tablet (5 mg) by mouth 2 times daily  Dispense: 60 tablet; Refill: 1    2. Lightheadedness  Stop the bydureon injections for 2 weeks.  Note the light headed spells in diary.  If light headedness stops, STOP injections altogether.     3. Essential hypertension            Please call or return to clinic if your symptoms don't go away.    Follow up plan  Please make a clinic appointment for follow up with me (MARTHA BINGHAM) in 5-6   weeks for follow up on medication change.    Thank you for coming to [facility] today.  Lab Testing:  **If you had lab testing today and your results are reassuring or normal they will be mailed to you or sent through [employer] within 7 days.   **If the lab tests need quick action we will call you with the results.  The phone number we will call with results is # [phone] (home) none (work). If this is not the best number please call our clinic and change the number.  Medication Refills:  If you need any refills please call your pharmacy and they will contact us.   If you need to  your refill at a new pharmacy, please contact the new pharmacy directly. The new pharmacy will help you get your medications transferred faster.   Scheduling:  If you have any concerns about today's visit or wish to schedule another appointment please call  our office during normal business hours [phone] (8-5:00 M-F)  If a referral was made to a [facility] and you don't get a call from central scheduling please call [phone].  If a Mammogram was ordered for you at [facility] call [phone] to schedule or change your appointment.  If you had an XRay/CT/Ultrasound/MRI ordered the number is [phone] to schedule or change your radiology appointment.   Medical Concerns:  If you have urgent medical concerns please call [phone] at any time of the day.  If you have a medical emergency please call 140.        Follow-ups after your visit        Who to contact     Please call your clinic at [phone] to:    Ask questions about your health    Make or cancel appointments    Discuss your medicines    Learn about your test results    Speak to your doctor   If you have compliments or concerns about an experience at your clinic, or if you wish to file a complaint, please contact [facility] Patient Relations at [phone] or email us at [email or web address]         Additional Information About Your Visit        [employer] Information     [employer] gives you secure access to your electronic health record. If you see a primary care provider, you can also send messages to your care team and make appointments. If you have questions, please call your primary care clinic.  If you do not have a primary care provider, please call [phone] and they will assist you.      [employer] is an electronic gateway that provides easy, online access to your medical records. With [employer], you can request a clinic appointment, read your test results, renew a prescription or communicate with your care team.     To access your existing account, please contact your [facility] or call [phone] for assistance.        Care EveryWhere ID     This is your Care EveryWhere ID. This could be  "used by other organizations to access your [place] medical records  [id number]        Your Vitals Were     Pulse Temperature Respirations Height Pulse Oximetry BMI (Body Mass Index)    71 97.4  F (36.3  C) (Oral) 14 5' 6.14\" (168 cm) 99% 24.49 kg/m2       Blood Pressure from Last 3 Encounters:   04/19/17 126/75   03/22/17 133/76   02/08/17 128/77    Weight from Last 3 Encounters:   04/19/17 152 lb 6.4 oz (69.1 kg)   03/22/17 151 lb 3.2 oz (68.6 kg)   02/08/17 148 lb (67.1 kg)              Today, you had the following     No orders found for display         Today's Medication Changes          These changes are accurate as of: 4/19/17  3:03 PM.  If you have any questions, ask your nurse or doctor.               Start taking these medicines.        Dose/Directions    memantine 5 MG tablet   Commonly known as:  NAMENDA   Used for:  Late onset Alzheimer's disease without behavioral disturbance   Started by:  Steffen Bingham MD        Dose:  5 mg   Take 1 tablet (5 mg) by mouth 2 times daily   Quantity:  60 tablet   Refills:  1            Where to get your medicines      These medications were sent to [place] [address] [place] [address]     Phone:  [phone]     memantine 5 MG tablet                Primary Care Provider Office Phone # Fax #    Steffen Bingham [phone]        [facility] [address] [address]        Thank you!     Thank you for choosing [facility]  for your care. Our goal is always to provide you with excellent care. Hearing back from our patients is one way we can continue to improve our services. Please take a few minutes to complete the written survey that you may receive in the mail after your visit with us. Thank you!             Your Updated Medication List - Protect others around you: Learn how to safely use, store and throw away your medicines at " www.disposemymeds.org.          This list is accurate as of: 4/19/17  3:03 PM.  Always use your most recent med list.                   Brand Name Dispense Instructions for use    amLODIPine 10 MG tablet    NORVASC    90 tablet    Take 1 tablet (10 mg) by mouth daily       atorvastatin 40 MG tablet    LIPITOR    90 tablet    Take 1 tablet (40 mg) by mouth daily       B-D ULTRA-FINE 33 LANCETS Misc     100 each    Use 2-4 times daily as directed       Bacitracin-Polymyx-Balwinder-HC 1 % Oint     1 Tube    Apply to affected areas  daily with dressing changes       blood glucose monitoring test strip    LEE CONTOUR    100 each    Use to test blood sugars 3 times daily or as directed.       calcium carbonate 500 MG tablet    OS-PEDRO PABLO 500 mg [place]. Ca     Take 500 mg by mouth 2 times daily Reported on 4/19/2017       capsaicin 0.025 % Crea cream    ZOSTRIX    60 g    To affected area 2-3 times daily as needed.       * ciclopirox 0.77 % cream    LOPROX    90 g    Apply topically 2 times daily To feet and toenails.       * ciclopirox 0.77 % cream    LOPROX    270 g    Apply topically 2 times daily       donepezil 10 MG tablet    ARICEPT    90 tablet    Take 1 tablet (10 mg) by mouth At Bedtime       exenatide ER 2 MG recon vial kit susp for weekly inj    BYDUREON    3 kit    Inject 2 mg Subcutaneous once a week       latanoprost 0.005 % ophthalmic solution    XALATAN     1 drop At Bedtime Reported on 4/19/2017       losartan 100 MG tablet    COZAAR    90 tablet    Take 1 tablet (100 mg) by mouth daily       memantine 5 MG tablet    NAMENDA    60 tablet    Take 1 tablet (5 mg) by mouth 2 times daily       metFORMIN 500 MG tablet    GLUCOPHAGE    180 tablet    Take 1 tablet (500 mg) by mouth 2 times daily (with meals)       MULTIVITAMIN PO      Reported on 4/19/2017       salicylic acid 17 % Liqd     1 Bottle    Externally apply 1 dose * topically daily To left foot wart.       tolnaftate 1 % Aerp    ANTIFUNGAL SPRAY POWDER    130  g    Apply to feet twice daily       VITAMIN D3 PO      Take by mouth daily Reported on 4/19/2017       * Notice:  This list has 2 medication(s) that are the same as other medications prescribed for you. Read the directions carefully, and ask your doctor or other care provider to review them with you.

## 2017-04-19 NOTE — PROGRESS NOTES
"      HPI:       Jaydon Alegria is a 81 year old who presents for the following  Patient presents with:  RECHECK      Depression/Anxiety  Follow-Up    Status since last visit: No change    What is going well? relationship  and family     Life Stressors:none     Other associated symptoms:light headed     New Significant life event:No    Current substance abuse: None    Anxiety / Panic symptoms: Yes-  Light headed     Recent PHQ-9 Scores:     PHQ-9 SCORE 2/8/2017   Total Score 7     Recent TAMICA-7 Scores:    No flowsheet data found.      Today' sPHQ 9 Reviewed and it is  normal         Patient presents with a follow up for light headedness and blood pressure and also diabetic.  Reported blood pressure taken at home with normal readings.  Reports that he is still having reacurring light headedness.  Wife stated that balance is normal and reports no difference in appearance with dizziness episodes.                  Adherence and Exercise  Medication side effects: no  How often is a medication missed? Never  Are you able to follow the treatment plan? Yes  Exercise:walking  Daily         Problem, Medication and Allergy Lists were reviewed and are current.  Patient is an established patient of this clinic.         Review of Systems:   Review of Systems   Constitutional: Negative for activity change, appetite change, fatigue, fever and unexpected weight change.   Respiratory: Negative.    Cardiovascular: Negative.    Gastrointestinal: Negative.    Endocrine: Negative for heat intolerance and polyuria.   Neurological: Negative for weakness.   Psychiatric/Behavioral: Positive for confusion. Negative for agitation, behavioral problems, decreased concentration and dysphoric mood. The patient is not nervous/anxious.              Physical Exam:   Patient Vitals for the past 24 hrs:   BP Temp Temp src Pulse Resp SpO2 Height Weight   04/19/17 1428 126/75 97.4  F (36.3  C) Oral 71 14 99 % 5' 6.14\" (168 cm) 152 lb 6.4 oz " (69.1 kg)     Body mass index is 24.49 kg/(m^2).  Vitals were reviewed and were normal     Physical Exam   Constitutional: He appears well-developed and well-nourished.   Neck: No thyromegaly present.   Cardiovascular: Normal rate, regular rhythm, normal heart sounds and intact distal pulses.    Pulmonary/Chest: Effort normal and breath sounds normal.   Musculoskeletal: He exhibits no edema.   Lymphadenopathy:     He has no cervical adenopathy.   Neurological: He is alert.   Skin: Skin is warm and dry.   Psychiatric: He has a normal mood and affect. His behavior is normal. Judgment and thought content normal.   Nursing note and vitals reviewed.        Results:       Assessment and Plan     Jaydon Romero was seen today for recheck.    Diagnoses and all orders for this visit:    Late onset Alzheimer's disease without behavioral disturbance - he is progressing into middle stage and after discussion with pt and wife we will add memantine to donepezil.  RTC 4-6 wks.  -     memantine (NAMENDA) 5 MG tablet; Take 1 tablet (5 mg) by mouth 2 times daily    Lightheadedness - Does not appear to be hypotension; could be low BG.  Discussed medications, ordered patient to stop the bydureon injections for 2 weeks.  Check light headed spells during break from medication.      Essential hypertension - at goal.      There are no discontinued medications.  Options for treatment and follow-up care were reviewed with the patient. Jaydon Romero Airam  engaged in the decision making process and verbalized understanding of the options discussed and agreed with the final plan.    Steffen Bingham MD

## 2017-04-20 ASSESSMENT — PATIENT HEALTH QUESTIONNAIRE - PHQ9: SUM OF ALL RESPONSES TO PHQ QUESTIONS 1-9: 0

## 2017-04-21 DIAGNOSIS — R73.9 HYPERGLYCEMIA: ICD-10-CM

## 2017-04-21 NOTE — TELEPHONE ENCOUNTER
Refill request received via fax from patient's pharmacy. Refilled per standing protocol. Sent to patient's preferred pharmacy.    Date of last office visit: 4/19/17  Last A1c value: 6.4    Medication refilled as previously written.    Megan Farnsworth RN

## 2017-04-24 DIAGNOSIS — I10 ESSENTIAL HYPERTENSION: ICD-10-CM

## 2017-04-24 RX ORDER — AMLODIPINE BESYLATE 10 MG/1
10 TABLET ORAL DAILY
Qty: 90 TABLET | Refills: 3 | Status: SHIPPED | OUTPATIENT
Start: 2017-04-24 | End: 2017-04-25

## 2017-04-24 NOTE — TELEPHONE ENCOUNTER
Request for medication refill:    Date of last visit at clinic: 4/19/17    Please complete refill if appropriate and CLOSE ENCOUNTER.    Closing the encounter signifies the refill is complete.    If refill has been denied, please complete the smart phrase .smirefuse and route it to the [facility] RN TRIAGE pool to inform the patient and the pharmacy.    Susie Reyes

## 2017-04-25 DIAGNOSIS — I10 ESSENTIAL HYPERTENSION: ICD-10-CM

## 2017-04-25 RX ORDER — AMLODIPINE BESYLATE 10 MG/1
10 TABLET ORAL DAILY
Qty: 90 TABLET | Refills: 3 | Status: SHIPPED | OUTPATIENT
Start: 2017-04-25 | End: 2018-03-07

## 2017-04-25 RX ORDER — LOSARTAN POTASSIUM 100 MG/1
100 TABLET ORAL DAILY
Qty: 90 TABLET | Refills: 3 | Status: SHIPPED | OUTPATIENT
Start: 2017-04-25 | End: 2018-03-07

## 2017-04-25 NOTE — TELEPHONE ENCOUNTER
Date of last visit at clinic: 4-19-17    Please complete refill and CLOSE ENCOUNTER.  Closing the encounter signifies the refill is complete.

## 2017-05-26 ENCOUNTER — OFFICE VISIT (OUTPATIENT)
Dept: FAMILY MEDICINE | Facility: CLINIC | Age: 82
End: 2017-05-26

## 2017-05-26 VITALS
TEMPERATURE: 98.3 F | BODY MASS INDEX: 24.08 KG/M2 | SYSTOLIC BLOOD PRESSURE: 123 MMHG | WEIGHT: 149.8 LBS | HEART RATE: 62 BPM | RESPIRATION RATE: 16 BRPM | OXYGEN SATURATION: 99 % | DIASTOLIC BLOOD PRESSURE: 73 MMHG

## 2017-05-26 DIAGNOSIS — E11.9 TYPE 2 DIABETES MELLITUS WITHOUT COMPLICATION, WITHOUT LONG-TERM CURRENT USE OF INSULIN (H): ICD-10-CM

## 2017-05-26 DIAGNOSIS — G30.1 LATE ONSET ALZHEIMER'S DISEASE WITHOUT BEHAVIORAL DISTURBANCE (H): Primary | ICD-10-CM

## 2017-05-26 DIAGNOSIS — F02.80 LATE ONSET ALZHEIMER'S DISEASE WITHOUT BEHAVIORAL DISTURBANCE (H): Primary | ICD-10-CM

## 2017-05-26 RX ORDER — MEMANTINE HYDROCHLORIDE 10 MG/1
10 TABLET ORAL 2 TIMES DAILY
Qty: 180 TABLET | Refills: 3 | Status: SHIPPED | OUTPATIENT
Start: 2017-05-26 | End: 2017-06-27

## 2017-05-26 ASSESSMENT — ENCOUNTER SYMPTOMS
AGITATION: 0
CONFUSION: 1
GASTROINTESTINAL NEGATIVE: 1
ACTIVITY CHANGE: 0
DECREASED CONCENTRATION: 0
FATIGUE: 0
FEVER: 0
NERVOUS/ANXIOUS: 0
WEAKNESS: 0
LIGHT-HEADEDNESS: 0
RESPIRATORY NEGATIVE: 1
DYSPHORIC MOOD: 0
CARDIOVASCULAR NEGATIVE: 1
UNEXPECTED WEIGHT CHANGE: 0
APPETITE CHANGE: 0

## 2017-05-26 NOTE — PROGRESS NOTES
HPI:       Jaydon Alegria is a 81 year old who presents for the following  Patient presents with:  RECHECK: F/U Depression    No lightheadedness since last visit.  Feels well.  No complaints.  No GI symptoms.  Wife reports he is taking metformin as prescribed.  No change in cognition.  Mood good.  No falls.         Problem, Medication and Allergy Lists were reviewed and are current.  Patient is an established patient of this clinic.         Review of Systems:   Review of Systems   Constitutional: Negative for activity change, appetite change, fatigue, fever and unexpected weight change.   Respiratory: Negative.    Cardiovascular: Negative.    Gastrointestinal: Negative.    Endocrine: Negative for heat intolerance and polyuria.   Neurological: Negative for weakness and light-headedness.   Psychiatric/Behavioral: Positive for confusion. Negative for agitation, behavioral problems, decreased concentration and dysphoric mood. The patient is not nervous/anxious.              Physical Exam:   Patient Vitals for the past 24 hrs:   BP Temp Temp src Pulse Resp SpO2 Weight   05/26/17 1307 123/73 98.3  F (36.8  C) Oral 62 16 99 % 149 lb 12.8 oz (67.9 kg)     Body mass index is 24.08 kg/(m^2).  Vitals were reviewed and were normal     Physical Exam   Constitutional: He appears well-developed and well-nourished.   Neck: No thyromegaly present.   Cardiovascular: Normal rate, regular rhythm, normal heart sounds and intact distal pulses.    Pulmonary/Chest: Effort normal and breath sounds normal.   Musculoskeletal: He exhibits no edema.   Lymphadenopathy:     He has no cervical adenopathy.   Neurological: He is alert.   Skin: Skin is warm and dry.   Psychiatric: He has a normal mood and affect. His behavior is normal. Judgment and thought content normal.   Nursing note and vitals reviewed.        Results:       Assessment and Plan     Jaydon was seen today for recheck.    Diagnoses and all orders for this  visit:    Late onset Alzheimer's disease without behavioral disturbance - no problems with added memantine.  Will increase to full dose.  F/U 3 mos.  -     memantine (NAMENDA) 10 MG tablet; Take 1 tablet (10 mg) by mouth 2 times daily    Type 2 diabetes mellitus without complication, without long-term current use of insulin (H) - no lightheadedness since bydureon was stopped.  Will continue on metformin and recheck A1c in 3 mos.      There are no discontinued medications.  Options for treatment and follow-up care were reviewed with the patient. Jaydon Alegria  engaged in the decision making process and verbalized understanding of the options discussed and agreed with the final plan.    Steffen Bingham MD

## 2017-05-26 NOTE — MR AVS SNAPSHOT
After Visit Summary   5/26/2017    Jaydon Alegria    MRN: [id number]           Patient Information     Date Of Birth          1935        Visit Information        Provider Department      5/26/2017 12:50 PM Steffen Bingham MD [facility]        Today's Diagnoses     Late onset Alzheimer's disease without behavioral disturbance    -  1    Type 2 diabetes mellitus without complication, without long-term current use of insulin (H)          Care Instructions    1.  Stay off bydureon.    2.  Switch to 10 mg twice a day of metformin.          Follow-ups after your visit        Follow-up notes from your care team     Return in about 3 months (around 8/26/2017).      Who to contact     Please call your clinic at [phone] to:    Ask questions about your health    Make or cancel appointments    Discuss your medicines    Learn about your test results    Speak to your doctor   If you have compliments or concerns about an experience at your clinic, or if you wish to file a complaint, please contact [facility] Patient Relations at [phone] or email us at [email or web address]         Additional Information About Your Visit        MyChart Information     [employer] gives you secure access to your electronic health record. If you see a primary care provider, you can also send messages to your care team and make appointments. If you have questions, please call your primary care clinic.  If you do not have a primary care provider, please call [phone] and they will assist you.      [employer] is an electronic gateway that provides easy, online access to your medical records. With [employer], you can request a clinic appointment, read your test results, renew a prescription or communicate with your care team.     To access your existing account, please contact your [facility] or call [phone] for assistance.         Care EveryWhere ID     This is your Care EveryWhere ID. This could be used by other organizations to access your [place] medical records  [id number]        Your Vitals Were     Pulse Temperature Respirations Pulse Oximetry BMI (Body Mass Index)       62 98.3  F (36.8  C) (Oral) 16 99% 24.08 kg/m2        Blood Pressure from Last 3 Encounters:   05/26/17 123/73   04/19/17 126/75   03/22/17 133/76    Weight from Last 3 Encounters:   05/26/17 149 lb 12.8 oz (67.9 kg)   04/19/17 152 lb 6.4 oz (69.1 kg)   03/22/17 151 lb 3.2 oz (68.6 kg)              Today, you had the following     No orders found for display         Today's Medication Changes          These changes are accurate as of: 5/26/17  1:38 PM.  If you have any questions, ask your nurse or doctor.               These medicines have changed or have updated prescriptions.        Dose/Directions    * memantine 5 MG tablet   Commonly known as:  NAMENDA   This may have changed:  Another medication with the same name was added. Make sure you understand how and when to take each.   Used for:  Late onset Alzheimer's disease without behavioral disturbance   Changed by:  Steffen Bingham MD        Dose:  5 mg   Take 1 tablet (5 mg) by mouth 2 times daily   Quantity:  60 tablet   Refills:  1       * memantine 10 MG tablet   Commonly known as:  NAMENDA   This may have changed:  You were already taking a medication with the same name, and this prescription was added. Make sure you understand how and when to take each.   Used for:  Late onset Alzheimer's disease without behavioral disturbance   Changed by:  Steffen Bingham MD        Dose:  10 mg   Take 1 tablet (10 mg) by mouth 2 times daily   Quantity:  180 tablet   Refills:  3       * Notice:  This list has 2 medication(s) that are the same as other medications prescribed for you. Read the directions carefully, and ask your doctor or other care provider to review them with you.         Where to get your medicines       These medications were sent to [facility] - [place], [place] - [address] AT Portal to Registered [facility] Sites  [address], [place] [address]     Phone:  [phone]     memantine 10 MG tablet                Primary Care Provider Office Phone # Fax #    Steffen Bingham [phone]        [facility] [address] [facility] [address]  [facility] [address]        Thank you!     Thank you for choosing [facility]  for your care. Our goal is always to provide you with excellent care. Hearing back from our patients is one way we can continue to improve our services. Please take a few minutes to complete the written survey that you may receive in the mail after your visit with us. Thank you!             Your Updated Medication List - Protect others around you: Learn how to safely use, store and throw away your medicines at www.disposemymeds.org.          This list is accurate as of: 5/26/17  1:38 PM.  Always use your most recent med list.                   Brand Name Dispense Instructions for use    amLODIPine 10 MG tablet    NORVASC    90 tablet    Take 1 tablet (10 mg) by mouth daily       atorvastatin 40 MG tablet    LIPITOR    90 tablet    Take 1 tablet (40 mg) by mouth daily       B-D ULTRA-FINE 33 LANCETS Misc     100 each    Use 2-4 times daily as directed       Bacitracin-Polymyx-Balwinder-HC 1 % Oint     1 Tube    Apply to affected areas  daily with dressing changes       blood glucose monitoring test strip    LEE CONTOUR    100 each    Use to test blood sugars 3 times daily or as directed.       calcium carbonate 500 MG tablet    OS-PEDRO PABLO 500 mg [place]. Ca     Take 500 mg by mouth 2 times daily Reported on 4/19/2017       capsaicin 0.025 % Crea cream    ZOSTRIX    60 g    To affected area 2-3 times daily as needed.       * ciclopirox 0.77 % cream    LOPROX    90 g    Apply topically 2 times daily To feet and toenails.       * ciclopirox 0.77 % cream     LOPROX    270 g    Apply topically 2 times daily       donepezil 10 MG tablet    ARICEPT    90 tablet    Take 1 tablet (10 mg) by mouth At Bedtime       exenatide ER 2 MG recon vial kit susp for weekly inj    BYDUREON    3 kit    Inject 2 mg Subcutaneous once a week       latanoprost 0.005 % ophthalmic solution    XALATAN     1 drop At Bedtime Reported on 4/19/2017       losartan 100 MG tablet    COZAAR    90 tablet    Take 1 tablet (100 mg) by mouth daily       * memantine 5 MG tablet    NAMENDA    60 tablet    Take 1 tablet (5 mg) by mouth 2 times daily       * memantine 10 MG tablet    NAMENDA    180 tablet    Take 1 tablet (10 mg) by mouth 2 times daily       metFORMIN 500 MG tablet    GLUCOPHAGE    180 tablet    Take 1 tablet (500 mg) by mouth 2 times daily (with meals)       MULTIVITAMIN PO      Reported on 4/19/2017       salicylic acid 17 % Liqd     1 Bottle    Externally apply 1 dose * topically daily To left foot wart.       tolnaftate 1 % Aerp    ANTIFUNGAL SPRAY POWDER    130 g    Apply to feet twice daily       VITAMIN D3 PO      Take by mouth daily Reported on 4/19/2017       * Notice:  This list has 4 medication(s) that are the same as other medications prescribed for you. Read the directions carefully, and ask your doctor or other care provider to review them with you.

## 2017-05-30 ENCOUNTER — TELEPHONE (OUTPATIENT)
Dept: FAMILY MEDICINE | Facility: CLINIC | Age: 82
End: 2017-05-30

## 2017-05-30 DIAGNOSIS — R73.9 HYPERGLYCEMIA: ICD-10-CM

## 2017-05-30 NOTE — TELEPHONE ENCOUNTER
[facility] phone call message- medication clarification/question:    Full Medication Name:    memantine (NAMENDA) 10 MG tablet  Question: per patient's wife, patient was on 5 mg twice daily of above medication and they received prescription today and the dosage is increased to 10 mg twice daily which pcp did not mention  On their last visit. Wife is requesting clarification on whether this an error or pcp actually increased the dosage.     Pharmacy confirmed as      [facility] [place] - [address] AT PORTAL TO [facility] SITES: Yes    OK to leave a message on voice mail? Yes    Primary language: English      needed? No    Call taken on May 30, 2017 at 3:18 PM by Hiral Vazquez

## 2017-05-30 NOTE — TELEPHONE ENCOUNTER
[facility] phone call message- patient requesting a refill:    Full Medication Name: metFORMIN (GLUCOPHAGE) 500 MG tablet    Dose:     Pharmacy confirmed as     [facility] - [place] - [address] AT Portal to Registered [facility]  [address]  [facility] [address]  Phone: [phone] Fax: [phone]  : Yes    Additional Comments:  Patient's wife called stating on their last visit pcp told them he will increase the dosage of above medication but the dosage has not changed yet. Wife is requesting prescription of increased dosage sent to pharmacy.    OK to leave a message on voice mail? Yes    Primary language: English      needed? No    Call taken on May 30, 2017 at 3:14 PM by Hiral Vazquez

## 2017-05-30 NOTE — TELEPHONE ENCOUNTER
Per office visit notes from PCP:  no problems with added memantine.  Will increase to full dose.  F/U 3 mos.    No authorization on file to discuss PHI with wife. Stamped letter and Authorization form sent to patient with above instructions and request to give authorization to discuss PHI with wife.    Megan Farnsworth RN

## 2017-05-30 NOTE — LETTER
Jaydon Alegria  [address]  [place] [address]    2017    Dear Jaydon Alegria,    At your last visit, Dr. Bingham noted that you had not reported any problems with memantine (Namenda) and that he would increase the medication to the full dose of 10mg twice per day. He would like you to follow up again in 3 months.    It also appears that your wife, Stephania, frequently calls our office on your behalf to clarify medications or ask questions about your medical care. We currently do not have your signed consent to share your protected health information with anyone but yourself. I have also sent you a copy of our Authorization to Verbally Discuss Protected Health Information form. Please fill it out to list any persons you give permission for us to share information with. Then sign and either mail back to us or bring to your next appointment. This form does not . If you wish to change any information on it in the future, you can fill out a new one which will replace the old one.    Please call our clinic with any questions.    Sincerely,  Megan Farnsworth RN

## 2017-05-30 NOTE — TELEPHONE ENCOUNTER
Date of last visit at clinic: 5/26/2017    Please complete refill and CLOSE ENCOUNTER.  Closing the encounter signifies the refill is complete.

## 2017-05-31 NOTE — TELEPHONE ENCOUNTER
Office visit note from PCP copied into previous note which specifically state to increase dose and follow up in 3 months.     Message routed to PCP to clarify if dose increase to 10 mg BID was indeed intentional. RN unable to relay information to wife as no authorization on file, however letter has been sent to patient explaining dose increase per this writer's previous note.    Megan Farnsworth RN

## 2017-05-31 NOTE — TELEPHONE ENCOUNTER
Patient's wife called back and stated that the dose was supposed to be increased to 1,000 mg and would like the updated dosage sent to the prescription.     Martha Monson, CMA

## 2017-05-31 NOTE — TELEPHONE ENCOUNTER
"Attempted to relay message below to . Wife chimed in on the phone and said \"that must be a mistake\" and would like clarification as to whether or not this was a mistake.   "

## 2017-06-01 NOTE — TELEPHONE ENCOUNTER
Letter sent to patient on 5/30 instructed to increase to 10mg BID. No alternate number for patient. Will wait fr signed consent to discuss with wife.    Megan Farnsworth RN

## 2017-06-01 NOTE — TELEPHONE ENCOUNTER
"Called and spoke with PT's wife. She advised that Dr. Bingham told them to increase the dose of Metformin to 1000mg BID. After reviewing the AVS, Dr. Bingham put \"Switch to 10mg twice a day of metformin\". The RX was written for 500mg BID. Routing to Dr. Bingham to advise which dose of Metformin he would like the patient to take.     Pt's wife also asked about the Namenda RX. They increased the dose to 10mg from 5mg for 90 days.during the last visit. Pt's wife would like to know is they should continue the 10mg after the 90 days or go back to the 5mg. Routing to Dr. Bingham for direction.     Gareth George RN    "

## 2017-06-02 NOTE — TELEPHONE ENCOUNTER
Conveyed Dr. Bingham's message to wife via phone. No further questions at this time.     Gareth George RN

## 2017-06-02 NOTE — TELEPHONE ENCOUNTER
OK, here's the problem:  I made a typo error in the instructions.  I meant to write increase the MEMANTINE to 10 mg BID.  Here's what he should be on:    Metformin 500 mg BID  Memantine (Namenda) 10 mg BID    He should stay on the 10 mg BID dose of memantine indefinitely.    Please convey to wife.

## 2017-06-21 DIAGNOSIS — G30.1 LATE ONSET ALZHEIMER'S DISEASE WITHOUT BEHAVIORAL DISTURBANCE (H): Primary | ICD-10-CM

## 2017-06-21 DIAGNOSIS — F02.80 LATE ONSET ALZHEIMER'S DISEASE WITHOUT BEHAVIORAL DISTURBANCE (H): ICD-10-CM

## 2017-06-21 DIAGNOSIS — G30.1 LATE ONSET ALZHEIMER'S DISEASE WITHOUT BEHAVIORAL DISTURBANCE (H): ICD-10-CM

## 2017-06-21 DIAGNOSIS — F02.80 LATE ONSET ALZHEIMER'S DISEASE WITHOUT BEHAVIORAL DISTURBANCE (H): Primary | ICD-10-CM

## 2017-06-21 RX ORDER — MEMANTINE HYDROCHLORIDE 5 MG/1
5 TABLET ORAL 2 TIMES DAILY
Qty: 60 TABLET | Refills: 1 | OUTPATIENT
Start: 2017-06-21

## 2017-06-21 NOTE — TELEPHONE ENCOUNTER
Request for medication refill:    Date of last visit at clinic: 5-26-17    Please complete refill if appropriate and CLOSE ENCOUNTER.    Closing the encounter signifies the refill is complete.    If refill has been denied, please complete the smart phrase .smirefuse and route it to the [facility] RN TRIAGE pool to inform the patient and the pharmacy.    Katherine Oliveira

## 2017-06-21 NOTE — TELEPHONE ENCOUNTER
Medication Refill Denied  Reason: Dose of med has been increased to 10 mg BID and new Rx sent.  Provider: Pt does not need to be informed.  PCS: Please notify the pharmacy  RN: Please contact the patient to verify that dose of memantine is 10 mg BID    RN may not order temporary refill so that the patient will not run out of medication prior to the scheduled visit.    Steffen Bingham MD

## 2017-06-27 DIAGNOSIS — F02.80 LATE ONSET ALZHEIMER'S DISEASE WITHOUT BEHAVIORAL DISTURBANCE (H): ICD-10-CM

## 2017-06-27 DIAGNOSIS — G30.1 LATE ONSET ALZHEIMER'S DISEASE WITHOUT BEHAVIORAL DISTURBANCE (H): ICD-10-CM

## 2017-06-27 RX ORDER — MEMANTINE HYDROCHLORIDE 10 MG/1
10 TABLET ORAL 2 TIMES DAILY
Qty: 180 TABLET | Refills: 3 | Status: SHIPPED | OUTPATIENT
Start: 2017-06-27 | End: 2018-02-23

## 2017-06-27 NOTE — TELEPHONE ENCOUNTER
Date of last visit at clinic: 5-26-17    Please complete refill and CLOSE ENCOUNTER.  Closing the encounter signifies the refill is complete.

## 2017-07-12 ENCOUNTER — TELEPHONE (OUTPATIENT)
Dept: FAMILY MEDICINE | Facility: CLINIC | Age: 82
End: 2017-07-12

## 2017-07-12 DIAGNOSIS — E11.9 TYPE 2 DIABETES MELLITUS WITHOUT COMPLICATION, WITHOUT LONG-TERM CURRENT USE OF INSULIN (H): Primary | ICD-10-CM

## 2017-07-12 NOTE — TELEPHONE ENCOUNTER
[facility] phone call message - order or referral request for patient:     Order or referral being requested: A1C lab order      Additional Comments:  Patient would like to come lab only appt before his visit on 07/19/2017 but there is no order placed in his chart. Please place A1C lab order in pt chart.    OK to leave a message on voice mail? Yes    Primary language: English      needed? No    Call taken on July 12, 2017 at 10:39 AM by Hiral Vazquez

## 2017-07-17 NOTE — TELEPHONE ENCOUNTER
Patient's wife calling in regard to having labs (A1C) completed prior to appointment; inquired about fasting and turn around time for results. Spoke with lab who advised A1C is non-fasting and can be completed prior to appointment on 7/19/17 (no need for separate, prior appointment). Advised patient's wife. Patient's wife expressed understanding.

## 2017-07-19 ENCOUNTER — OFFICE VISIT (OUTPATIENT)
Dept: FAMILY MEDICINE | Facility: CLINIC | Age: 82
End: 2017-07-19

## 2017-07-19 VITALS
SYSTOLIC BLOOD PRESSURE: 142 MMHG | DIASTOLIC BLOOD PRESSURE: 71 MMHG | HEIGHT: 68 IN | BODY MASS INDEX: 23.7 KG/M2 | TEMPERATURE: 97.5 F | HEART RATE: 64 BPM | RESPIRATION RATE: 14 BRPM | OXYGEN SATURATION: 99 % | WEIGHT: 156.4 LBS

## 2017-07-19 DIAGNOSIS — F02.80 LATE ONSET ALZHEIMER'S DISEASE WITHOUT BEHAVIORAL DISTURBANCE (H): ICD-10-CM

## 2017-07-19 DIAGNOSIS — I10 ESSENTIAL HYPERTENSION: Primary | ICD-10-CM

## 2017-07-19 DIAGNOSIS — G30.1 LATE ONSET ALZHEIMER'S DISEASE WITHOUT BEHAVIORAL DISTURBANCE (H): ICD-10-CM

## 2017-07-19 DIAGNOSIS — R73.9 HYPERGLYCEMIA: ICD-10-CM

## 2017-07-19 DIAGNOSIS — E11.9 TYPE 2 DIABETES MELLITUS WITHOUT COMPLICATION, WITHOUT LONG-TERM CURRENT USE OF INSULIN (H): ICD-10-CM

## 2017-07-19 LAB — HBA1C MFR BLD: 7.5 % (ref 4.1–5.7)

## 2017-07-19 ASSESSMENT — ENCOUNTER SYMPTOMS
CONFUSION: 1
AGITATION: 0
RESPIRATORY NEGATIVE: 1
DYSPHORIC MOOD: 0
CARDIOVASCULAR NEGATIVE: 1
FATIGUE: 0
DECREASED CONCENTRATION: 0
GASTROINTESTINAL NEGATIVE: 1
ACTIVITY CHANGE: 0
LIGHT-HEADEDNESS: 0
APPETITE CHANGE: 0
NERVOUS/ANXIOUS: 0
FEVER: 0
UNEXPECTED WEIGHT CHANGE: 0
WEAKNESS: 0

## 2017-07-19 NOTE — PROGRESS NOTES
"      HPI:       Jaydon Alegria is a 82 year old who presents for the following  Patient presents with:  Diabetes: increased readings      Diabetes Follow-up      Patient is checking blood sugars: 1 times a week on mondays morning before breakfast, before and after dinner         -Last A1C was   Lab Results   Component Value Date    A1C 7.5 07/19/2017    A1C 6.4 01/18/2017    A1C 6.1 07/13/2016    A1C 6.5 02/24/2016    A1C 6.5 08/10/2015        Diabetic concerns: None and other - the readings are high and inconsistent.     Chest Pain or exercise related calf pain (claudication):no     Symptoms of hypoglycemia (low blood sugar): dizzy, most likely due to weather not DM. There is some confusion      Paresthesias (numbness or burning in feet) or sores:Yes- burning and pain with walking nearly everyday     Diabetic eye exam within the last year?: Yes        Adherence and Exercise  Medication side effects: no  How often is a medication missed? Never  Are you able to follow the treatment plan? Yes  Exercise:swimming 2-3 days/week for an average of 30-45 minutes          Problem, Medication and Allergy Lists were reviewed and are current.  Patient is an established patient of this clinic.         Review of Systems:   Review of Systems   Constitutional: Negative for activity change, appetite change, fatigue, fever and unexpected weight change.   Respiratory: Negative.    Cardiovascular: Negative.    Gastrointestinal: Negative.    Endocrine: Negative for heat intolerance and polyuria.   Neurological: Negative for weakness and light-headedness.   Psychiatric/Behavioral: Positive for confusion. Negative for agitation, behavioral problems, decreased concentration and dysphoric mood. The patient is not nervous/anxious.              Physical Exam:   Patient Vitals for the past 24 hrs:   BP Temp Temp src Pulse Resp SpO2 Height Weight   07/19/17 1632 147/69 97.5  F (36.4  C) Oral 64 14 99 % 5' 7.72\" (172 cm) 156 " lb 6.4 oz (70.9 kg)     Body mass index is 23.98 kg/(m^2).  Vital signs normal except BP     Physical Exam   Constitutional: He appears well-developed and well-nourished.   Neck: No thyromegaly present.   Cardiovascular: Normal rate, regular rhythm, normal heart sounds and intact distal pulses.    Pulmonary/Chest: Effort normal and breath sounds normal.   Musculoskeletal: He exhibits no edema.   Lymphadenopathy:     He has no cervical adenopathy.   Neurological: He is alert.   Skin: Skin is warm and dry.   Psychiatric: He has a normal mood and affect. His behavior is normal. Judgment and thought content normal.   Nursing note and vitals reviewed.        Results:     Results from last visit:  Office Visit on 01/18/2017   Component Date Value Ref Range Status     Cholesterol 01/18/2017 140.7  0.0 - 200.0 mg/dL Final     Cholesterol/HDL Ratio 01/18/2017 3.7  0.0 - 5.0 Final     HDL Cholesterol 01/18/2017 37.6* >40.0 mg/dL Final     LDL Cholesterol Calculated 01/18/2017 77  0 - 129 mg/dL Final     Triglycerides 01/18/2017 130.3  0.0 - 150.0 mg/dL Final     VLDL Cholesterol 01/18/2017 26.1  7.0 - 32.0 mg/dL Final     Hemoglobin A1C 01/18/2017 6.4* 4.3 - 6.0 % Final     Sodium 01/18/2017 141  133 - 144 mmol/L Final     Potassium 01/18/2017 3.9  3.4 - 5.3 mmol/L Final     Chloride 01/18/2017 104  94 - 109 mmol/L Final     Carbon Dioxide 01/18/2017 27  20 - 32 mmol/L Final     Anion Gap 01/18/2017 10  3 - 14 mmol/L Final     Glucose 01/18/2017 108* 70 - 99 mg/dL Final     Urea Nitrogen 01/18/2017 21  7 - 30 mg/dL Final     Creatinine 01/18/2017 0.76  0.66 - 1.25 mg/dL Final     GFR Estimate 01/18/2017   >60 mL/min/1.7m2 Final                    Value:>90  Non  GFR Calc       GFR Estimate If Black 01/18/2017   >60 mL/min/1.7m2 Final                    Value:>90   GFR Calc       Calcium 01/18/2017 10.0  8.5 - 10.1 mg/dL Final     Creatinine Urine 01/18/2017 132  mg/dL Final     Albumin Urine  mg/L 01/18/2017 24  mg/L Final     Albumin Urine mg/g Cr 01/18/2017 18.33* 0 - 17 mg/g Cr Final     Assessment and Plan     Jaydon Romero was seen today for diabetes.    Diagnoses and all orders for this visit:    Essential hypertension - usually in very good control.  No changes for today, recheck next visit.    Type 2 diabetes mellitus without complication, without long-term current use of insulin (H) - off exenatide and on 1000 mg/d of metformin his A1c has risen from 6.5 to 7.5.  He is otherwise in good physical health.  Will increase metformin to 2000 mg/d and recheck A1c in 3 mos.   -     Hemoglobin A1c ([place]'s)    Late onset Alzheimer's disease without behavioral disturbance - son Cortes was present today and I provided him and pt with overview of his dementia and treatment with memantine and donepezil.          There are no discontinued medications.  Options for treatment and follow-up care were reviewed with the patient. Jaydon Romero Nick Alegria  engaged in the decision making process and verbalized understanding of the options discussed and agreed with the final plan.    Steffen Bingham MD

## 2017-07-19 NOTE — MR AVS SNAPSHOT
After Visit Summary   7/19/2017    Jaydon Alegria    MRN: [id number]           Patient Information     Date Of Birth          1935        Visit Information        Provider Department      7/19/2017 4:20 PM Steffen Bingham MD [facility]        Today's Diagnoses     Essential hypertension    -  1    Type 2 diabetes mellitus without complication, without long-term current use of insulin (H)        Late onset Alzheimer's disease without behavioral disturbance        Hyperglycemia          Care Instructions    Increase metformin to 2 tablets twice a day.    All other medicines the same.          Follow-ups after your visit        Follow-up notes from your care team     Return in about 3 months (around 10/19/2017).      Who to contact     Please call your clinic at [phone] to:    Ask questions about your health    Make or cancel appointments    Discuss your medicines    Learn about your test results    Speak to your doctor   If you have compliments or concerns about an experience at your clinic, or if you wish to file a complaint, please contact [facility] Patient Relations at [phone] or email us at [email or web address]         Additional Information About Your Visit        MyChart Information     proVITALt gives you secure access to your electronic health record. If you see a primary care provider, you can also send messages to your care team and make appointments. If you have questions, please call your primary care clinic.  If you do not have a primary care provider, please call [phone] and they will assist you.      Navent is an electronic gateway that provides easy, online access to your medical records. With Navent, you can request a clinic appointment, read your test results, renew a prescription or communicate with your care team.     To access your existing account, please contact your [facility]  [facility] [facility] or call [phone] for assistance.        Care EveryWhere ID     This is your Care EveryWhere ID. This could be used by other organizations to access your [place] medical records  [id number]        Your Vitals Were     Pulse Temperature Respirations Height Pulse Oximetry BMI (Body Mass Index)    64 97.5  F (36.4  C) (Oral) 14 5' 7.72\" (172 cm) 99% 23.98 kg/m2       Blood Pressure from Last 3 Encounters:   07/19/17 142/71   05/26/17 123/73   04/19/17 126/75    Weight from Last 3 Encounters:   07/19/17 156 lb 6.4 oz (70.9 kg)   05/26/17 149 lb 12.8 oz (67.9 kg)   04/19/17 152 lb 6.4 oz (69.1 kg)              We Performed the Following     Hemoglobin A1c ([place])          Today's Medication Changes          These changes are accurate as of: 7/19/17  5:19 PM.  If you have any questions, ask your nurse or doctor.               These medicines have changed or have updated prescriptions.        Dose/Directions    metFORMIN 500 MG tablet   Commonly known as:  GLUCOPHAGE   This may have changed:  how much to take   Used for:  Hyperglycemia   Changed by:  Steffen Bingham MD        Dose:  1000 mg   Take 2 tablets (1,000 mg) by mouth 2 times daily (with meals)   Quantity:  180 tablet   Refills:  3                Primary Care Provider Office Phone # Fax #    Steffen Bingham [phone]        [facility] [address]  [place] [facility] [place] [address]        Equal Access to Services     JEREMIAH RAMOS AH: Hadii aad ku hadasho Soomaali, waaxda luqadaha, qaybta kaalmada adeegyada, blanca cazares. So [facility] [phone].    ATENCIÓN: Si habla español, tiene a elliott disposición servicios gratuitos de asistencia lingüística. Llame al [phone].    We comply with applicable federal civil rights laws and [place] laws. We do not discriminate on the basis of race, color, national origin, age, disability sex, sexual orientation or gender identity.            Thank you! "     Thank you for choosing [facility]  for your care. Our goal is always to provide you with excellent care. Hearing back from our patients is one way we can continue to improve our services. Please take a few minutes to complete the written survey that you may receive in the mail after your visit with us. Thank you!             Your Updated Medication List - Protect others around you: Learn how to safely use, store and throw away your medicines at www.disposemymeds.org.          This list is accurate as of: 7/19/17  5:19 PM.  Always use your most recent med list.                   Brand Name Dispense Instructions for use Diagnosis    amLODIPine 10 MG tablet    NORVASC    90 tablet    Take 1 tablet (10 mg) by mouth daily    Essential hypertension       atorvastatin 40 MG tablet    LIPITOR    90 tablet    Take 1 tablet (40 mg) by mouth daily    Type 2 diabetes mellitus without complication, without long-term current use of insulin (H)       B-D ULTRA-FINE 33 LANCETS Misc     100 each    Use 2-4 times daily as directed    Type 2 diabetes mellitus without complication, without long-term current use of insulin (H)       Bacitracin-Polymyx-Balwinder-HC 1 % Oint     1 Tube    Apply to affected areas  daily with dressing changes    Open toe wound, initial encounter       blood glucose monitoring test strip    LEE CONTOUR    100 each    Use to test blood sugars 3 times daily or as directed.    Type 2 diabetes mellitus without complication, without long-term current use of insulin (H)       calcium carbonate 1250 MG tablet    OS-PEDRO PABLO 500 mg [place]. Ca     Take 500 mg by mouth 2 times daily Reported on 4/19/2017        capsaicin 0.025 % Crea cream    ZOSTRIX    60 g    To affected area 2-3 times daily as needed.    Diabetic neuropathy with neurologic complication (H)       * ciclopirox 0.77 % cream    LOPROX    90 g    Apply topically 2 times daily To feet and toenails.    Tinea pedis of both feet       *  ciclopirox 0.77 % cream    LOPROX    270 g    Apply topically 2 times daily    Tinea pedis of both feet       donepezil 10 MG tablet    ARICEPT    90 tablet    Take 1 tablet (10 mg) by mouth At Bedtime    Late onset Alzheimer's disease without behavioral disturbance       exenatide ER 2 MG recon vial kit susp for weekly inj    BYDUREON    3 kit    Inject 2 mg Subcutaneous once a week    Type 2 diabetes mellitus without complication, unspecified long term insulin use status (H)       latanoprost 0.005 % ophthalmic solution    XALATAN     1 drop At Bedtime Reported on 4/19/2017        losartan 100 MG tablet    COZAAR    90 tablet    Take 1 tablet (100 mg) by mouth daily    Essential hypertension       memantine 10 MG tablet    NAMENDA    180 tablet    Take 1 tablet (10 mg) by mouth 2 times daily    Late onset Alzheimer's disease without behavioral disturbance       metFORMIN 500 MG tablet    GLUCOPHAGE    180 tablet    Take 2 tablets (1,000 mg) by mouth 2 times daily (with meals)    Hyperglycemia       MULTIVITAMIN PO      Reported on 4/19/2017        salicylic acid 17 % Liqd     1 Bottle    Externally apply 1 dose * topically daily To left foot wart.    Plantar warts       tolnaftate 1 % Aerp    ANTIFUNGAL SPRAY POWDER    130 g    Apply to feet twice daily    Tinea pedis of both feet       VITAMIN D3 PO      Take by mouth daily Reported on 4/19/2017        * Notice:  This list has 2 medication(s) that are the same as other medications prescribed for you. Read the directions carefully, and ask your doctor or other care provider to review them with you.

## 2017-07-29 ENCOUNTER — TRANSFERRED RECORDS (OUTPATIENT)
Dept: HEALTH INFORMATION MANAGEMENT | Facility: CLINIC | Age: 82
End: 2017-07-29

## 2017-08-23 DIAGNOSIS — R73.9 HYPERGLYCEMIA: ICD-10-CM

## 2017-08-23 NOTE — TELEPHONE ENCOUNTER
Request for medication refill:    Date of last visit at clinic: 07/19/2017    Please complete refill if appropriate and CLOSE ENCOUNTER.    Closing the encounter signifies the refill is complete.    If refill has been denied, please complete the smart phrase .smirefuse and route it to the [facility] RN TRIAGE pool to inform the patient and the pharmacy.    Hui Lewis, CMA

## 2017-09-25 DIAGNOSIS — F02.80 LATE ONSET ALZHEIMER'S DISEASE WITHOUT BEHAVIORAL DISTURBANCE (H): ICD-10-CM

## 2017-09-25 DIAGNOSIS — G30.1 LATE ONSET ALZHEIMER'S DISEASE WITHOUT BEHAVIORAL DISTURBANCE (H): ICD-10-CM

## 2017-09-25 RX ORDER — DONEPEZIL HYDROCHLORIDE 10 MG/1
10 TABLET, FILM COATED ORAL AT BEDTIME
Qty: 90 TABLET | Refills: 3 | Status: SHIPPED | OUTPATIENT
Start: 2017-09-25 | End: 2018-02-23

## 2017-09-25 NOTE — TELEPHONE ENCOUNTER
Date of last visit at clinic: 7/19/17      Please complete refill and CLOSE ENCOUNTER.  Closing the encounter signifies the refill is complete.

## 2017-10-11 ENCOUNTER — OFFICE VISIT (OUTPATIENT)
Dept: FAMILY MEDICINE | Facility: CLINIC | Age: 82
End: 2017-10-11

## 2017-10-11 VITALS
HEART RATE: 70 BPM | TEMPERATURE: 97.3 F | SYSTOLIC BLOOD PRESSURE: 126 MMHG | OXYGEN SATURATION: 97 % | DIASTOLIC BLOOD PRESSURE: 69 MMHG | RESPIRATION RATE: 16 BRPM | WEIGHT: 151.6 LBS | BODY MASS INDEX: 23.24 KG/M2

## 2017-10-11 DIAGNOSIS — F02.80 LATE ONSET ALZHEIMER'S DISEASE WITHOUT BEHAVIORAL DISTURBANCE (H): ICD-10-CM

## 2017-10-11 DIAGNOSIS — R73.9 HYPERGLYCEMIA: ICD-10-CM

## 2017-10-11 DIAGNOSIS — I10 ESSENTIAL HYPERTENSION: ICD-10-CM

## 2017-10-11 DIAGNOSIS — G30.1 LATE ONSET ALZHEIMER'S DISEASE WITHOUT BEHAVIORAL DISTURBANCE (H): ICD-10-CM

## 2017-10-11 DIAGNOSIS — E11.9 TYPE 2 DIABETES MELLITUS WITHOUT COMPLICATION, WITHOUT LONG-TERM CURRENT USE OF INSULIN (H): Primary | ICD-10-CM

## 2017-10-11 LAB — HBA1C MFR BLD: 7 % (ref 4.1–5.7)

## 2017-10-11 ASSESSMENT — ENCOUNTER SYMPTOMS
GASTROINTESTINAL NEGATIVE: 1
DYSPHORIC MOOD: 0
UNEXPECTED WEIGHT CHANGE: 0
CONFUSION: 1
ACTIVITY CHANGE: 0
WEAKNESS: 0
FEVER: 0
FATIGUE: 0
APPETITE CHANGE: 0
RESPIRATORY NEGATIVE: 1
CARDIOVASCULAR NEGATIVE: 1
LIGHT-HEADEDNESS: 0
NERVOUS/ANXIOUS: 0

## 2017-10-11 NOTE — PROGRESS NOTES
HPI:       Jaydon Alegria is a 82 year old who presents for the following  Patient presents with:  RECHECK: Follow up,  Diabetes: DM Follow up    No problems on Metformin 2000 mg/d.  Off bydureon.  No episodes of hypoglycemia.  No lightheadedness.  He feels well.  Cognition stable.  Planning to spend January and part of February in [place].  Exercising regularly.  Wife reports no problems.     Went to dermatology and is getting two BCCs removed soon.    Problem, Medication and Allergy Lists were reviewed and are current.  Patient is an established patient of this clinic.         Review of Systems:   Review of Systems   Constitutional: Negative for activity change, appetite change, fatigue, fever and unexpected weight change.   Respiratory: Negative.    Cardiovascular: Negative.    Gastrointestinal: Negative.    Endocrine: Negative for heat intolerance and polyuria.   Neurological: Negative for weakness and light-headedness.   Psychiatric/Behavioral: Positive for confusion. Negative for behavioral problems and dysphoric mood. The patient is not nervous/anxious.              Physical Exam:   Patient Vitals for the past 24 hrs:   BP Temp Temp src Pulse Resp SpO2 Weight   10/11/17 1502 126/69 97.3  F (36.3  C) Oral 70 16 97 % 151 lb 9.6 oz (68.8 kg)     Body mass index is 23.24 kg/(m^2).  Vitals were reviewed and were normal     Physical Exam   Constitutional: He appears well-developed and well-nourished.   Neck: No thyromegaly present.   Cardiovascular: Normal rate, regular rhythm, normal heart sounds and intact distal pulses.    Pulmonary/Chest: Effort normal and breath sounds normal.   Musculoskeletal: He exhibits no edema.   Lymphadenopathy:     He has no cervical adenopathy.   Neurological: He is alert.   Skin: Skin is warm and dry.   Psychiatric: He has a normal mood and affect. His behavior is normal. Judgment and thought content normal.   Nursing note and vitals reviewed.        Results:      Results from last visit:  Office Visit on 07/19/2017   Component Date Value Ref Range Status     Hemoglobin A1C 07/19/2017 7.5* 4.1 - 5.7 % Final     Assessment and Plan     Jaydon Romero was seen today for recheck and diabetes.    Diagnoses and all orders for this visit:    Type 2 diabetes mellitus without complication, without long-term current use of insulin (H) - A1c excellent today at 7.0%.  He will stay on metformin at current dose.  -     Hemoglobin A1c ([place]'s)    Essential hypertension - continue Rx.  At goal.    Late onset Alzheimer's disease without behavioral disturbance - doing well, continue donepezil and memantine.    Hyperglycemia  -     metFORMIN (GLUCOPHAGE) 500 MG tablet; Take 2 tablets (1,000 mg) by mouth 2 times daily (with meals)      There are no discontinued medications.  Options for treatment and follow-up care were reviewed with the patient. Jaydon Romero Airam  engaged in the decision making process and verbalized understanding of the options discussed and agreed with the final plan.    Steffen Bingham MD

## 2017-10-11 NOTE — MR AVS SNAPSHOT
After Visit Summary   10/11/2017    Jaydon Alegria    MRN: [id number]           Patient Information     Date Of Birth          1935        Visit Information        Provider Department      10/11/2017 2:40 PM Steffen Bingham MD [facility]        Today's Diagnoses     Type 2 diabetes mellitus without complication, without long-term current use of insulin (H)    -  1    Essential hypertension        Late onset Alzheimer's disease without behavioral disturbance        Hyperglycemia          Care Instructions    Continue metformin at current dose.    Get a flu shot.          Follow-ups after your visit        Follow-up notes from your care team     Return in about 3 months (around 1/11/2018).      Who to contact     Please call your clinic at [phone] to:    Ask questions about your health    Make or cancel appointments    Discuss your medicines    Learn about your test results    Speak to your doctor   If you have compliments or concerns about an experience at your clinic, or if you wish to file a complaint, please contact [facility] Patient Relations at [phone] or email us at [email or web address]         Additional Information About Your Visit        MyChart Information     [employer] gives you secure access to your electronic health record. If you see a primary care provider, you can also send messages to your care team and make appointments. If you have questions, please call your primary care clinic.  If you do not have a primary care provider, please call [phone] and they will assist you.      [employer] is an electronic gateway that provides easy, online access to your medical records. With [employer], you can request a clinic appointment, read your test results, renew a prescription or communicate with your care team.     To access your existing account, please contact your [facility]  Clinic or call [phone] for assistance.        Care EveryWhere ID     This is your Care EveryWhere ID. This could be used by other organizations to access your [place] medical records  [id number]        Your Vitals Were     Pulse Temperature Respirations Pulse Oximetry BMI (Body Mass Index)       70 97.3  F (36.3  C) (Oral) 16 97% 23.24 kg/m2        Blood Pressure from Last 3 Encounters:   10/11/17 126/69   07/19/17 142/71   05/26/17 123/73    Weight from Last 3 Encounters:   10/11/17 151 lb 9.6 oz (68.8 kg)   07/19/17 156 lb 6.4 oz (70.9 kg)   05/26/17 149 lb 12.8 oz (67.9 kg)              We Performed the Following     Hemoglobin A1c ([facility])          Today's Medication Changes          These changes are accurate as of: 10/11/17  6:19 PM.  If you have any questions, ask your nurse or doctor.               Stop taking these medicines if you haven't already. Please contact your care team if you have questions.     exenatide ER 2 MG recon vial kit susp for weekly inj   Commonly known as:  BYDUREON                Where to get your medicines      These medications were sent to [facility] - [place], [place] - [address] AT Portal to [facility] Sites  [facility] [address], [facility] [address]     Phone:  [phone]     metFORMIN 500 MG tablet                Primary Care Provider Office Phone # Fax #    Steffen Bingham [phone]       [address] [place] [address]        Equal Access to Services     [facility] : Hadii dmitri mendosa Soisacc, waaxda luqadaha, qaybta kaalblanca garcia . So [facility] [phone].    ATENCIÓN: Si habla español, tiene a elliott disposición servicios gratuitos de asistencia lingüística. Llame al [phone].    We comply with applicable federal civil rights laws and [place] laws. We do not discriminate on the basis of race, color, national origin, age, disability, sex, sexual  orientation, or gender identity.            Thank you!     Thank you for choosing [facility]  for your care. Our goal is always to provide you with excellent care. Hearing back from our patients is one way we can continue to improve our services. Please take a few minutes to complete the written survey that you may receive in the mail after your visit with us. Thank you!             Your Updated Medication List - Protect others around you: Learn how to safely use, store and throw away your medicines at www.disposemymeds.org.          This list is accurate as of: 10/11/17  6:19 PM.  Always use your most recent med list.                   Brand Name Dispense Instructions for use Diagnosis    amLODIPine 10 MG tablet    NORVASC    90 tablet    Take 1 tablet (10 mg) by mouth daily    Essential hypertension       atorvastatin 40 MG tablet    LIPITOR    90 tablet    Take 1 tablet (40 mg) by mouth daily    Type 2 diabetes mellitus without complication, without long-term current use of insulin (H)       B-D ULTRA-FINE 33 LANCETS Misc     100 each    Use 2-4 times daily as directed    Type 2 diabetes mellitus without complication, without long-term current use of insulin (H)       Bacitracin-Polymyx-Balwinder-HC 1 % Oint     1 Tube    Apply to affected areas  daily with dressing changes    Open toe wound, initial encounter       blood glucose monitoring test strip    LEE CONTOUR    100 each    Use to test blood sugars 3 times daily or as directed.    Type 2 diabetes mellitus without complication, without long-term current use of insulin (H)       calcium carbonate 1250 MG tablet    OS-PEDRO PABLO 500 mg [place]. Ca     Take 500 mg by mouth 2 times daily Reported on 4/19/2017        capsaicin 0.025 % Crea cream    ZOSTRIX    60 g    To affected area 2-3 times daily as needed.    Diabetic neuropathy with neurologic complication (H)       * ciclopirox 0.77 % cream    LOPROX    90 g    Apply topically 2 times daily To feet  and toenails.    Tinea pedis of both feet       * ciclopirox 0.77 % cream    LOPROX    270 g    Apply topically 2 times daily    Tinea pedis of both feet       donepezil 10 MG tablet    ARICEPT    90 tablet    Take 1 tablet (10 mg) by mouth At Bedtime    Late onset Alzheimer's disease without behavioral disturbance       latanoprost 0.005 % ophthalmic solution    XALATAN     1 drop At Bedtime Reported on 4/19/2017        losartan 100 MG tablet    COZAAR    90 tablet    Take 1 tablet (100 mg) by mouth daily    Essential hypertension       memantine 10 MG tablet    NAMENDA    180 tablet    Take 1 tablet (10 mg) by mouth 2 times daily    Late onset Alzheimer's disease without behavioral disturbance       metFORMIN 500 MG tablet    GLUCOPHAGE    180 tablet    Take 2 tablets (1,000 mg) by mouth 2 times daily (with meals)    Hyperglycemia       MULTIVITAMIN PO      Reported on 4/19/2017        salicylic acid 17 % Liqd     1 Bottle    Externally apply 1 dose * topically daily To left foot wart.    Plantar warts       tolnaftate 1 % Aerp    ANTIFUNGAL SPRAY POWDER    130 g    Apply to feet twice daily    Tinea pedis of both feet       VITAMIN D3 PO      Take by mouth daily Reported on 4/19/2017        * Notice:  This list has 2 medication(s) that are the same as other medications prescribed for you. Read the directions carefully, and ask your doctor or other care provider to review them with you.

## 2017-10-30 ENCOUNTER — TRANSFERRED RECORDS (OUTPATIENT)
Dept: HEALTH INFORMATION MANAGEMENT | Facility: CLINIC | Age: 82
End: 2017-10-30

## 2017-11-28 ENCOUNTER — TELEPHONE (OUTPATIENT)
Dept: FAMILY MEDICINE | Facility: CLINIC | Age: 82
End: 2017-11-28

## 2017-11-28 DIAGNOSIS — R73.9 HYPERGLYCEMIA: ICD-10-CM

## 2017-11-28 NOTE — TELEPHONE ENCOUNTER
[facility] phone call message- patient requesting a refill:    Full Medication Name: metFORMIN (GLUCOPHAGE) 500 MG tablet    Dose: Take 2 tablets (1,000 mg) by mouth 2 times daily (with meals)    Pharmacy confirmed as     [facility] - [place] - [address] AT Portal to [place] [address]  [facility] [address]  Phone: [phone] Fax: [phone]  : Yes    Additional Comments: none     OK to leave a message on voice mail? Yes    Primary language: English      needed? No    Call taken on November 28, 2017 at 9:17 AM by Monisha Schwartz

## 2017-11-28 NOTE — TELEPHONE ENCOUNTER
Medication sent to pharmacy at 10/11 visit intended to be year supply, however was written for 1.5 month supply. Updated script for 3 month supply, dispensing quantity 360, with 3 refills    Megan Martinez RN

## 2017-12-04 ENCOUNTER — TELEPHONE (OUTPATIENT)
Dept: FAMILY MEDICINE | Facility: CLINIC | Age: 82
End: 2017-12-04

## 2017-12-04 NOTE — TELEPHONE ENCOUNTER
[facility] phone call message- medication clarification/question:    Full Medication Name: metformin  Take 2 tablets (1,000 mg) by mouth 2 times daily (with meals    Question: Please resend the medication or call it in please.  The pharmacy states they did not get the Rx.  Pharmacy confirmed as     [facility] - [place], [place] - [address] AT Portal to [place] [address]  [facility] [address]  Phone: [phone] Fax: [phone]  : Yes    OK to leave a message on voice mail? Yes    Call taken on December 4, 2017 at 12:03 PM by Judith Sifuentes

## 2017-12-04 NOTE — TELEPHONE ENCOUNTER
Called [facility] who confirmed order was received. States a family member called them yesterday to cancel order. States they are able to reprocess order if desired, family will just need to call.    Called wife to discuss. Wife states she cancelled 500 mg order because PCP increased dose. RN explained the new order from 11/28 is for 500 mg tablets, but instructions are to take 2 tablets (1000 mg) twice daily. RN advised that [facility] can re process delivery if she calls them. Explained to wife that 11/28 order is the most recent order. Wife verbalized understanding.    Megan Martinez RN

## 2018-02-23 DIAGNOSIS — F02.80 LATE ONSET ALZHEIMER'S DISEASE WITHOUT BEHAVIORAL DISTURBANCE (H): ICD-10-CM

## 2018-02-23 DIAGNOSIS — E11.9 TYPE 2 DIABETES MELLITUS WITHOUT COMPLICATION, WITHOUT LONG-TERM CURRENT USE OF INSULIN (H): ICD-10-CM

## 2018-02-23 DIAGNOSIS — G30.1 LATE ONSET ALZHEIMER'S DISEASE WITHOUT BEHAVIORAL DISTURBANCE (H): ICD-10-CM

## 2018-02-23 RX ORDER — DONEPEZIL HYDROCHLORIDE 10 MG/1
10 TABLET, FILM COATED ORAL AT BEDTIME
Qty: 90 TABLET | Refills: 3 | Status: SHIPPED | OUTPATIENT
Start: 2018-02-23 | End: 2018-02-27

## 2018-02-23 RX ORDER — ATORVASTATIN CALCIUM 40 MG/1
40 TABLET, FILM COATED ORAL DAILY
Qty: 90 TABLET | Refills: 3 | Status: SHIPPED | OUTPATIENT
Start: 2018-02-23 | End: 2018-02-27

## 2018-02-23 RX ORDER — MEMANTINE HYDROCHLORIDE 10 MG/1
10 TABLET ORAL 2 TIMES DAILY
Qty: 180 TABLET | Refills: 3 | Status: SHIPPED | OUTPATIENT
Start: 2018-02-23 | End: 2018-02-27

## 2018-02-27 ENCOUNTER — TELEPHONE (OUTPATIENT)
Dept: FAMILY MEDICINE | Facility: CLINIC | Age: 83
End: 2018-02-27

## 2018-02-27 DIAGNOSIS — E11.9 TYPE 2 DIABETES MELLITUS WITHOUT COMPLICATION, WITHOUT LONG-TERM CURRENT USE OF INSULIN (H): ICD-10-CM

## 2018-02-27 DIAGNOSIS — F02.80 LATE ONSET ALZHEIMER'S DISEASE WITHOUT BEHAVIORAL DISTURBANCE (H): ICD-10-CM

## 2018-02-27 DIAGNOSIS — G30.1 LATE ONSET ALZHEIMER'S DISEASE WITHOUT BEHAVIORAL DISTURBANCE (H): ICD-10-CM

## 2018-02-27 RX ORDER — DONEPEZIL HYDROCHLORIDE 10 MG/1
10 TABLET, FILM COATED ORAL AT BEDTIME
Qty: 90 TABLET | Refills: 3 | Status: SHIPPED | OUTPATIENT
Start: 2018-02-27 | End: 2018-02-27

## 2018-02-27 RX ORDER — ATORVASTATIN CALCIUM 40 MG/1
40 TABLET, FILM COATED ORAL DAILY
Qty: 90 TABLET | Refills: 3 | Status: SHIPPED | OUTPATIENT
Start: 2018-02-27 | End: 2018-02-27

## 2018-02-27 RX ORDER — ATORVASTATIN CALCIUM 40 MG/1
40 TABLET, FILM COATED ORAL DAILY
Qty: 90 TABLET | Refills: 3 | Status: SHIPPED | OUTPATIENT
Start: 2018-02-27 | End: 2018-03-07

## 2018-02-27 RX ORDER — MEMANTINE HYDROCHLORIDE 10 MG/1
10 TABLET ORAL 2 TIMES DAILY
Qty: 180 TABLET | Refills: 3 | Status: SHIPPED | OUTPATIENT
Start: 2018-02-27 | End: 2018-02-27

## 2018-02-27 RX ORDER — MEMANTINE HYDROCHLORIDE 10 MG/1
10 TABLET ORAL 2 TIMES DAILY
Qty: 180 TABLET | Refills: 3 | Status: SHIPPED | OUTPATIENT
Start: 2018-02-27 | End: 2018-03-07

## 2018-02-27 RX ORDER — DONEPEZIL HYDROCHLORIDE 10 MG/1
10 TABLET, FILM COATED ORAL AT BEDTIME
Qty: 90 TABLET | Refills: 3 | Status: SHIPPED | OUTPATIENT
Start: 2018-02-27 | End: 2018-03-07

## 2018-02-27 NOTE — TELEPHONE ENCOUNTER
[facility] phone call message- patient requesting a refill:    Full Medication Name: atorvastatin (LIPITOR) 40 MG tablet, memantine (NAMENDA) 10 MG tablet and donepezil (ARICEPT) 10 MG tablet    Rx's were recently sent to a [facility] in [place]. These refills needs to be sent to [facility], (tel)[phone] (fax)[phone]    Pharmacy confirmed as   [id number] - [place], [place] - [address] [place] [address] [place] [address]  Phone: [phone] Fax: [phone]    [facility] [address] [facility] - [place], [place] - [address] [address]  [address] [address]  Phone: [phone] Fax: [phone]    [facility] - [place], [place] - [address] AT Portal to [facility] Sites  [address]  [facility] [address]  Phone: [phone] Fax: [phone]  :    NO:  [facility]    Additional Comments: see note above. Sent message to triage    OK to leave a message on voice mail? Yes    Primary language: English      needed? No    Call taken on February 27, 2018 at 2:27 PM by Yeimy Figueroa

## 2018-02-27 NOTE — TELEPHONE ENCOUNTER
RN resent rx's to [facility] pharmacy. RN called [facility] mail order in [place] to cancel Rx    Rebekah Flores RN

## 2018-03-07 ENCOUNTER — OFFICE VISIT (OUTPATIENT)
Dept: FAMILY MEDICINE | Facility: CLINIC | Age: 83
End: 2018-03-07
Payer: COMMERCIAL

## 2018-03-07 VITALS
DIASTOLIC BLOOD PRESSURE: 70 MMHG | BODY MASS INDEX: 22.14 KG/M2 | WEIGHT: 144.4 LBS | TEMPERATURE: 97.5 F | HEART RATE: 68 BPM | SYSTOLIC BLOOD PRESSURE: 108 MMHG | OXYGEN SATURATION: 97 %

## 2018-03-07 DIAGNOSIS — F02.80 LATE ONSET ALZHEIMER'S DISEASE WITHOUT BEHAVIORAL DISTURBANCE (H): ICD-10-CM

## 2018-03-07 DIAGNOSIS — E11.9 TYPE 2 DIABETES MELLITUS WITHOUT COMPLICATION, WITHOUT LONG-TERM CURRENT USE OF INSULIN (H): Primary | ICD-10-CM

## 2018-03-07 DIAGNOSIS — F41.9 ANXIETY: ICD-10-CM

## 2018-03-07 DIAGNOSIS — I10 ESSENTIAL HYPERTENSION: ICD-10-CM

## 2018-03-07 DIAGNOSIS — G30.1 LATE ONSET ALZHEIMER'S DISEASE WITHOUT BEHAVIORAL DISTURBANCE (H): ICD-10-CM

## 2018-03-07 DIAGNOSIS — R07.9 CHEST PAIN, UNSPECIFIED TYPE: ICD-10-CM

## 2018-03-07 LAB
BUN SERPL-MCNC: 19.9 MG/DL (ref 7–21)
CALCIUM SERPL-MCNC: 10.2 MG/DL (ref 8.5–10.1)
CHLORIDE SERPLBLD-SCNC: 99 MMOL/L (ref 98–110)
CO2 SERPL-SCNC: 29.6 MMOL/L (ref 20–32)
CREAT SERPL-MCNC: 0.8 MG/DL (ref 0.7–1.3)
CREAT UR-MCNC: 190 MG/DL
GFR SERPL CREATININE-BSD FRML MDRD: >90 ML/MIN/1.7 M2
GLUCOSE SERPL-MCNC: 112.1 MG'DL (ref 70–99)
HBA1C MFR BLD: 6.5 % (ref 4.1–5.7)
MICROALBUMIN UR-MCNC: 22 MG/L
MICROALBUMIN/CREAT UR: 11.63 MG/G CR (ref 0–17)
POTASSIUM SERPL-SCNC: 4.5 MMOL/DL (ref 3.3–4.5)
SODIUM SERPL-SCNC: 137.8 MMOL/L (ref 132.6–141.4)

## 2018-03-07 RX ORDER — ATORVASTATIN CALCIUM 40 MG/1
40 TABLET, FILM COATED ORAL DAILY
Qty: 90 TABLET | Refills: 3 | Status: SHIPPED | OUTPATIENT
Start: 2018-03-07 | End: 2018-06-27

## 2018-03-07 RX ORDER — AMLODIPINE BESYLATE 10 MG/1
10 TABLET ORAL DAILY
Qty: 90 TABLET | Refills: 3 | Status: SHIPPED | OUTPATIENT
Start: 2018-03-07 | End: 2019-05-07

## 2018-03-07 RX ORDER — ALPRAZOLAM 0.5 MG
TABLET ORAL
Qty: 45 TABLET | Refills: 0 | COMMUNITY
Start: 2018-03-07 | End: 2018-06-27 | Stop reason: ALTCHOICE

## 2018-03-07 RX ORDER — DONEPEZIL HYDROCHLORIDE 10 MG/1
10 TABLET, FILM COATED ORAL AT BEDTIME
Qty: 90 TABLET | Refills: 3 | Status: SHIPPED | OUTPATIENT
Start: 2018-03-07 | End: 2019-03-20

## 2018-03-07 RX ORDER — MEMANTINE HYDROCHLORIDE 10 MG/1
10 TABLET ORAL 2 TIMES DAILY
Qty: 180 TABLET | Refills: 3 | Status: SHIPPED | OUTPATIENT
Start: 2018-03-07 | End: 2019-05-29

## 2018-03-07 RX ORDER — LOSARTAN POTASSIUM 100 MG/1
100 TABLET ORAL DAILY
Qty: 90 TABLET | Refills: 3 | Status: SHIPPED | OUTPATIENT
Start: 2018-03-07 | End: 2018-07-05

## 2018-03-07 NOTE — PROGRESS NOTES
HPI:       82 year old patient who presents with:    Follow-up of multiple chronic conditions.  He and his wife have returned from a trip to [place].  Generally he did well on the trip but did have several occasions of acute anxiety.  They saw a physician in [place] who prescribed alprazolam, 0.25 mg, as needed for anxious episodes.  Since then he has used for tablets and his wife reports that they worked quite well.  There were particular problems with anxiety when he was flying and she stated that usage of alprazolam helped on the flight home.    Recently after returning home the patient had an episode of over 1 hour of atypical chest pain.  He was seen in urgent care over at [facility] and had a negative EKG and workup.  On the way out it was recommended that he schedule a stress test with his primary care physician.  He has had no other episodes of chest pain since.    The patient himself has no complaints.  He is hoping to get back into a regular exercise program.           Problem, Medication and Allergy Lists were reviewed and are current.  Patient is an established patient of this clinic.         Review of Systems:     Review of Systems   Constitutional: Negative for activity change, appetite change, fatigue, fever and unexpected weight change.   Respiratory: Negative.    Cardiovascular: Negative.    Gastrointestinal: Negative.    Endocrine: Negative for heat intolerance and polyuria.   Neurological: Negative for weakness and light-headedness.   Psychiatric/Behavioral: Positive for confusion. Negative for behavioral problems and dysphoric mood. The patient is not nervous/anxious.           Physical Exam:     /70  Pulse 68  Temp 97.5  F (36.4  C) (Oral)  Wt 144 lb 6.4 oz (65.5 kg)  SpO2 97%  BMI 22.14 kg/m2    Body mass index is 22.14 kg/(m^2).  Vitals reviewed.    Physical Exam   Constitutional: He appears well-developed and well-nourished.   Neck: No thyromegaly present.   Cardiovascular:  Normal rate, regular rhythm, normal heart sounds and intact distal pulses.    Pulmonary/Chest: Effort normal and breath sounds normal.   Musculoskeletal: He exhibits no edema.   Lymphadenopathy:     He has no cervical adenopathy.   Neurological: He is alert.   Skin: Skin is warm and dry.   Psychiatric: He has a normal mood and affect. His behavior is normal. Judgment and thought content normal.   Nursing note and vitals reviewed.          Results:     Results from last visit:  Office Visit on 10/11/2017   Component Date Value Ref Range Status     Hemoglobin A1C 10/11/2017 7.0* 4.1 - 5.7 % Final     Assessment and Plan     Jaydon Romero was seen today for er f/u and recheck.    Diagnoses and all orders for this visit:    Type 2 diabetes mellitus without complication, without long-term current use of insulin (H).  He is due for lab work today.  Continue current care..  -     atorvastatin (LIPITOR) 40 MG tablet; Take 1 tablet (40 mg) by mouth daily  -     Hemoglobin A1c ([facility])  -     Albumin Random Urine Quantitative with Creat Ratio    Late onset Alzheimer's disease without behavioral disturbance he is still in early stage disease but it is gradually progressing.  Continue dual CI therapy.  -     donepezil (ARICEPT) 10 MG tablet; Take 1 tablet (10 mg) by mouth At Bedtime  -     memantine (NAMENDA) 10 MG tablet; Take 1 tablet (10 mg) by mouth 2 times daily    Essential hypertension.  Blood pressure was actually a little on the low side today but it has been quite stable and we will continue his current medications.  -     Basic Metabolic Panel ([facility])  -     amLODIPine (NORVASC) 10 MG tablet; Take 1 tablet (10 mg) by mouth daily  -     losartan (COZAAR) 100 MG tablet; Take 1 tablet (100 mg) by mouth daily    Chest pain, unspecified type.  The pain is really quite atypical and at this point I would prefer to see records from urgent care before making a decision about a stress test.    Anxiety.  I had a  discussion with his wife regarding the risks of alprazolam in patients with cognitive deficits.  However, the dosage is very low and so far he has only needed it very sporadically.  We will continue this for now and track how much he is requiring.        Options for treatment and follow-up care were reviewed with the patient. Jaydon Alegria engaged in the decision making process and verbalized understanding of the options discussed and agreed with the final plan.    Steffen Bingham MD

## 2018-03-07 NOTE — MR AVS SNAPSHOT
After Visit Summary   3/7/2018    Jaydon Alegria    MRN: [id number]           Patient Information     Date Of Birth          1935        Visit Information        Provider Department      3/7/2018 2:20 PM Steffen Bingham MD [facility]        Today's Diagnoses     Type 2 diabetes mellitus without complication, without long-term current use of insulin (H)    -  1    Late onset Alzheimer's disease without behavioral disturbance        Essential hypertension        Chest pain, unspecified type        Anxiety          Care Instructions    1.  Stay on same medicines    2.  Use alprazolam sparingly          Follow-ups after your visit        Follow-up notes from your care team     Return in about 4 weeks (around 4/4/2018).      Your next 10 appointments already scheduled     Apr 11, 2018  2:20 PM CDT   Return Visit with [facility] ([facility])    [facility] [address],  [address]  [place] [address]   [phone]              Who to contact     Please call your clinic at [phone] to:    Ask questions about your health    Make or cancel appointments    Discuss your medicines    Learn about your test results    Speak to your doctor            Additional Information About Your Visit        [employer] Information     [employer] gives you secure access to your electronic health record. If you see a primary care provider, you can also send messages to your care team and make appointments. If you have questions, please call your primary care clinic.  If you do not have a primary care provider, please call [phone] and they will assist you.      [employer] is an electronic gateway that provides easy, online access to your medical records. With [employer], you can request a clinic appointment, read your test results, renew a prescription or communicate with your care team.     To access your existing account, please contact your  [facility] or call [phone] for assistance.        Care EveryWhere ID     This is your Care EveryWhere ID. This could be used by other organizations to access your [place] medical records  [id number]        Your Vitals Were     Pulse Temperature Pulse Oximetry BMI (Body Mass Index)          68 97.5  F (36.4  C) (Oral) 97% 22.14 kg/m2         Blood Pressure from Last 3 Encounters:   03/07/18 108/70   10/11/17 126/69   07/19/17 142/71    Weight from Last 3 Encounters:   03/07/18 65.5 kg (144 lb 6.4 oz)   10/11/17 68.8 kg (151 lb 9.6 oz)   07/19/17 70.9 kg (156 lb 6.4 oz)              We Performed the Following     Albumin Random Urine Quantitative with Creat Ratio     Basic Metabolic Panel ([facility])     Hemoglobin A1c ([facility])          Where to get your medicines      These medications were sent to [facility] - [facility] [address]  [address] [address]     Phone:  [phone]     amLODIPine 10 MG tablet    atorvastatin 40 MG tablet    donepezil 10 MG tablet    losartan 100 MG tablet    memantine 10 MG tablet          Primary Care Provider Office Phone # Fax #    Steffen Bingham [phone]       [address] [place] [address]        Equal Access to Services     JEREMIAH RAMOS AH: Hadii dmitri harrison hadasho Soisacc, waaxda luqadaha, qaybta kaalmablanca mooney. So [facility] [phone].    ATENCIÓN: Si habla español, tiene a elliott disposición servicios gratuitos de asistencia lingüística. Bobby al [phone].    We comply with applicable federal civil rights laws and [place] laws. We do not discriminate on the basis of race, color, national origin, age, disability, sex, sexual orientation, or gender identity.            Thank you!     Thank you for choosing [facility]  for your care. Our goal is always to provide you with excellent care. Hearing  back from our patients is one way we can continue to improve our services. Please take a few minutes to complete the written survey that you may receive in the mail after your visit with us. Thank you!             Your Updated Medication List - Protect others around you: Learn how to safely use, store and throw away your medicines at www.disposemymeds.org.          This list is accurate as of 3/7/18 11:59 PM.  Always use your most recent med list.                   Brand Name Dispense Instructions for use Diagnosis    ALPRAZolam 0.5 MG tablet    XANAX    45 tablet    1/2 tablet as needed for anxiety    Anxiety       amLODIPine 10 MG tablet    NORVASC    90 tablet    Take 1 tablet (10 mg) by mouth daily    Essential hypertension       atorvastatin 40 MG tablet    LIPITOR    90 tablet    Take 1 tablet (40 mg) by mouth daily    Type 2 diabetes mellitus without complication, without long-term current use of insulin (H)       B-D ULTRA-FINE 33 LANCETS Misc     100 each    Use 2-4 times daily as directed    Type 2 diabetes mellitus without complication, without long-term current use of insulin (H)       Bacitracin-Polymyx-Balwinder-HC 1 % Oint     1 Tube    Apply to affected areas  daily with dressing changes    Open toe wound, initial encounter       blood glucose monitoring test strip    LEE CONTOUR    100 each    Use to test blood sugars 3 times daily or as directed.    Type 2 diabetes mellitus without complication, without long-term current use of insulin (H)       calcium carbonate 1250 MG tablet    OS-PEDRO PABLO 500 mg [place]. Ca     Take 500 mg by mouth 2 times daily Reported on 4/19/2017        capsaicin 0.025 % Crea cream    ZOSTRIX    60 g    To affected area 2-3 times daily as needed.    Diabetic neuropathy with neurologic complication (H)       * ciclopirox 0.77 % cream    LOPROX    90 g    Apply topically 2 times daily To feet and toenails.    Tinea pedis of both feet       * ciclopirox 0.77 % cream    LOPROX    270 g     Apply topically 2 times daily    Tinea pedis of both feet       donepezil 10 MG tablet    ARICEPT    90 tablet    Take 1 tablet (10 mg) by mouth At Bedtime    Late onset Alzheimer's disease without behavioral disturbance       latanoprost 0.005 % ophthalmic solution    XALATAN     1 drop At Bedtime Reported on 4/19/2017        losartan 100 MG tablet    COZAAR    90 tablet    Take 1 tablet (100 mg) by mouth daily    Essential hypertension       memantine 10 MG tablet    NAMENDA    180 tablet    Take 1 tablet (10 mg) by mouth 2 times daily    Late onset Alzheimer's disease without behavioral disturbance       metFORMIN 500 MG tablet    GLUCOPHAGE    360 tablet    Take 2 tablets (1,000 mg) by mouth 2 times daily (with meals)    Hyperglycemia       MULTIVITAMIN PO      Reported on 4/19/2017        salicylic acid 17 % Liqd     1 Bottle    Externally apply 1 dose * topically daily To left foot wart.    Plantar warts       tolnaftate 1 % Aerp    ANTIFUNGAL SPRAY POWDER    130 g    Apply to feet twice daily    Tinea pedis of both feet       VITAMIN D3 PO      Take by mouth daily Reported on 4/19/2017        * Notice:  This list has 2 medication(s) that are the same as other medications prescribed for you. Read the directions carefully, and ask your doctor or other care provider to review them with you.

## 2018-03-08 ASSESSMENT — ENCOUNTER SYMPTOMS
DYSPHORIC MOOD: 0
LIGHT-HEADEDNESS: 0
CONFUSION: 1
WEAKNESS: 0
APPETITE CHANGE: 0
FATIGUE: 0
ACTIVITY CHANGE: 0
GASTROINTESTINAL NEGATIVE: 1
RESPIRATORY NEGATIVE: 1
FEVER: 0
CARDIOVASCULAR NEGATIVE: 1
NERVOUS/ANXIOUS: 0
UNEXPECTED WEIGHT CHANGE: 0

## 2018-04-11 ENCOUNTER — OFFICE VISIT (OUTPATIENT)
Dept: FAMILY MEDICINE | Facility: CLINIC | Age: 83
End: 2018-04-11
Payer: COMMERCIAL

## 2018-04-11 VITALS
DIASTOLIC BLOOD PRESSURE: 58 MMHG | HEART RATE: 71 BPM | BODY MASS INDEX: 22.23 KG/M2 | SYSTOLIC BLOOD PRESSURE: 115 MMHG | OXYGEN SATURATION: 97 % | WEIGHT: 145 LBS | RESPIRATION RATE: 18 BRPM | TEMPERATURE: 97.4 F

## 2018-04-11 DIAGNOSIS — I10 ESSENTIAL HYPERTENSION: Primary | ICD-10-CM

## 2018-04-11 DIAGNOSIS — G60.9 IDIOPATHIC PERIPHERAL NEUROPATHY: ICD-10-CM

## 2018-04-11 DIAGNOSIS — F02.80 LATE ONSET ALZHEIMER'S DISEASE WITHOUT BEHAVIORAL DISTURBANCE (H): ICD-10-CM

## 2018-04-11 DIAGNOSIS — G30.1 LATE ONSET ALZHEIMER'S DISEASE WITHOUT BEHAVIORAL DISTURBANCE (H): ICD-10-CM

## 2018-04-11 RX ORDER — DULOXETIN HYDROCHLORIDE 30 MG/1
30 CAPSULE, DELAYED RELEASE ORAL DAILY
Qty: 30 CAPSULE | Refills: 1 | Status: SHIPPED | OUTPATIENT
Start: 2018-04-11 | End: 2018-05-23

## 2018-04-11 ASSESSMENT — ENCOUNTER SYMPTOMS
GASTROINTESTINAL NEGATIVE: 1
UNEXPECTED WEIGHT CHANGE: 0
WEAKNESS: 0
APPETITE CHANGE: 0
FEVER: 0
DYSPHORIC MOOD: 0
CARDIOVASCULAR NEGATIVE: 1
ACTIVITY CHANGE: 0
NERVOUS/ANXIOUS: 0
RESPIRATORY NEGATIVE: 1
FATIGUE: 0
CONFUSION: 1
LIGHT-HEADEDNESS: 0

## 2018-04-11 NOTE — PATIENT INSTRUCTIONS
2 on duloxetine, then 2 weeks off.    Record level of foot pain daily 1=no pain, 5 = severe pain.    Bring in results!

## 2018-04-11 NOTE — PROGRESS NOTES
HPI:       Jaydon Alegria is a 82 year old who presents for the following  Patient presents with:  RECHECK:  DM       Diabetes Follow-up      Patient is checking blood sugars: 3 times a week         -Last A1C was   Lab Results   Component Value Date    A1C 6.5 03/07/2018    A1C 7.0 10/11/2017    A1C 7.5 07/19/2017    A1C 6.4 01/18/2017    A1C 6.1 07/13/2016        Diabetic concerns: None    Chest Pain or exercise related calf pain (claudication):no and Chest Pain yes     Symptoms of hypoglycemia (low blood sugar): none     Paresthesias (numbness or burning in feet) or sores: Yes sometimes.  He has been on duloxetine for a month and his wife is not sure if the medicine is helping.      Diabetic eye exam within the last year?: Yes      Adherence and Exercise  Medication side effects: no  How often is a medication missed? Never  Exercise:swimming 1 day/week for an average of less than 15 minutes        Problem, Medication and Allergy Lists were reviewed and are current.  Patient is an established patient of this clinic.         Review of Systems:   Review of Systems   Constitutional: Negative for activity change, appetite change, fatigue, fever and unexpected weight change.   Respiratory: Negative.    Cardiovascular: Negative.    Gastrointestinal: Negative.    Endocrine: Negative for heat intolerance and polyuria.   Neurological: Negative for weakness and light-headedness.        + burning feet intermittent   Psychiatric/Behavioral: Positive for confusion. Negative for behavioral problems and dysphoric mood. The patient is not nervous/anxious.              Physical Exam:   Patient Vitals for the past 24 hrs:   BP Temp Temp src Pulse Resp SpO2 Weight   04/11/18 1434 115/58 97.4  F (36.3  C) Oral 71 18 97 % 145 lb (65.8 kg)     Body mass index is 22.23 kg/(m^2).  Vitals were reviewed and were normal     Physical Exam   Constitutional: He appears well-developed and well-nourished.   Neck: No  thyromegaly present.   Cardiovascular: Normal rate, regular rhythm, normal heart sounds and intact distal pulses.    Pulmonary/Chest: Effort normal and breath sounds normal.   Musculoskeletal: He exhibits no edema.   Lymphadenopathy:     He has no cervical adenopathy.   Neurological: He is alert.   Skin: Skin is warm and dry.   Psychiatric: He has a normal mood and affect. His behavior is normal. Judgment and thought content normal.   Nursing note and vitals reviewed.        Results:     Results from last visit:  Office Visit on 03/07/2018   Component Date Value Ref Range Status     Hemoglobin A1C 03/07/2018 6.5* 4.1 - 5.7 % Final     Urea Nitrogen 03/07/2018 19.9  7.0 - 21.0 mg/dL Final     Calcium 03/07/2018 10.2* 8.5 - 10.1 mg/dL Final     Chloride 03/07/2018 99.0  98.0 - 110.0 mmol/L Final     Carbon Dioxide 03/07/2018 29.6  20.0 - 32.0 mmol/L Final     Creatinine 03/07/2018 0.8  0.7 - 1.3 mg/dL Final     Glucose 03/07/2018 112.1* 70.0 - 99.0 mg'dL Final     Potassium 03/07/2018 4.5  3.3 - 4.5 mmol/dL Final     Sodium 03/07/2018 137.8  132.6 - 141.4 mmol/L Final     GFR Estimate 03/07/2018 >90  >60.0 mL/min/1.7 m2 Final     GFR Estimate If Black 03/07/2018 >90  >60.0 mL/min/1.7 m2 Final     Creatinine Urine 03/07/2018 190  mg/dL Final     Albumin Urine mg/L 03/07/2018 22  mg/L Final     Albumin Urine mg/g Cr 03/07/2018 11.63  0 - 17 mg/g Cr Final     Assessment and Plan     Jaydon Romero was seen today for recheck.    Diagnoses and all orders for this visit:    Essential hypertension - at goal.    Idiopathic peripheral neuropathy - unclear if duloxetine is helping.  Will try 2 weeks on/2 weeks off with wife monitoring and recording daily symptoms.  F/u 6 wks.  -     DULoxetine (CYMBALTA) 30 MG EC capsule; Take 1 capsule (30 mg) by mouth daily    Late onset Alzheimer's disease without behavioral disturbance - stable, all needs met.      There are no discontinued medications.  Options for treatment and follow-up  care were reviewed with the patient. Jaydon Alegria  engaged in the decision making process and verbalized understanding of the options discussed and agreed with the final plan.    Steffen Bingham MD

## 2018-04-11 NOTE — MR AVS SNAPSHOT
After Visit Summary   4/11/2018    Jaydon Alegria    MRN: [id number]           Patient Information     Date Of Birth          1935        Visit Information        Provider Department      4/11/2018 2:20 PM Steffen Bingham MD [facility]        Today's Diagnoses     Essential hypertension    -  1    Idiopathic peripheral neuropathy        Late onset Alzheimer's disease without behavioral disturbance          Care Instructions    2 on duloxetine, then 2 weeks off.    Record level of foot pain daily 1=no pain, 5 = severe pain.    Bring in results!          Follow-ups after your visit        Follow-up notes from your care team     Return in about 6 weeks (around 5/23/2018).      Who to contact     Please call your clinic at [phone] to:    Ask questions about your health    Make or cancel appointments    Discuss your medicines    Learn about your test results    Speak to your doctor            Additional Information About Your Visit        MyChart Information     [employer] gives you secure access to your electronic health record. If you see a primary care provider, you can also send messages to your care team and make appointments. If you have questions, please call your primary care clinic.  If you do not have a primary care provider, please call [phone] and they will assist you.      [employer] is an electronic gateway that provides easy, online access to your medical records. With [employer], you can request a clinic appointment, read your test results, renew a prescription or communicate with your care team.     To access your existing account, please contact your [facility] or call [phone] for assistance.        Care EveryWhere ID     This is your Care EveryWhere ID. This could be used by other organizations to access your [place] medical records  [id number]        Your Vitals Were     Pulse Temperature Respirations  Pulse Oximetry BMI (Body Mass Index)       71 97.4  F (36.3  C) (Oral) 18 97% 22.23 kg/m2        Blood Pressure from Last 3 Encounters:   04/11/18 115/58   03/07/18 108/70   10/11/17 126/69    Weight from Last 3 Encounters:   04/11/18 145 lb (65.8 kg)   03/07/18 144 lb 6.4 oz (65.5 kg)   10/11/17 151 lb 9.6 oz (68.8 kg)              Today, you had the following     No orders found for display         Today's Medication Changes          These changes are accurate as of 4/11/18  3:14 PM.  If you have any questions, ask your nurse or doctor.               Start taking these medicines.        Dose/Directions    DULoxetine 30 MG EC capsule   Commonly known as:  CYMBALTA   Used for:  Idiopathic peripheral neuropathy   Started by:  Steffen Bingham MD        Dose:  30 mg   Take 1 capsule (30 mg) by mouth daily   Quantity:  30 capsule   Refills:  1            Where to get your medicines      These medications were sent to [place] [address]     Phone:  [phone]     DULoxetine 30 MG EC capsule                Primary Care Provider Office Phone # Fax #    Steffen Bingham [phone]       [address] [place] [address]        Equal Access to Services     JEREMIAH RAMOS AH: Hadjennie talleyo Soisacc, waaxda luqadaha, qaybta kaalmada kanchan, blanca cazares. So [facility] [phone].    ATENCIÓN: Si habla español, tiene a elliott disposición servicios gratuitos de asistencia lingüística. Bobby al [phone].    We comply with applicable federal civil rights laws and [place] laws. We do not discriminate on the basis of race, color, national origin, age, disability, sex, sexual orientation, or gender identity.            Thank you!     Thank you for choosing [facility]  for your care. Our goal is always to provide you with excellent care. Hearing back from our patients  is one way we can continue to improve our services. Please take a few minutes to complete the written survey that you may receive in the mail after your visit with us. Thank you!             Your Updated Medication List - Protect others around you: Learn how to safely use, store and throw away your medicines at www.disposemymeds.org.          This list is accurate as of 4/11/18  3:14 PM.  Always use your most recent med list.                   Brand Name Dispense Instructions for use Diagnosis    ALPRAZolam 0.5 MG tablet    XANAX    45 tablet    1/2 tablet as needed for anxiety    Anxiety       amLODIPine 10 MG tablet    NORVASC    90 tablet    Take 1 tablet (10 mg) by mouth daily    Essential hypertension       atorvastatin 40 MG tablet    LIPITOR    90 tablet    Take 1 tablet (40 mg) by mouth daily    Type 2 diabetes mellitus without complication, without long-term current use of insulin (H)       B-D ULTRA-FINE 33 LANCETS Misc     100 each    Use 2-4 times daily as directed    Type 2 diabetes mellitus without complication, without long-term current use of insulin (H)       Bacitracin-Polymyx-Balwinder-HC 1 % Oint     1 Tube    Apply to affected areas  daily with dressing changes    Open toe wound, initial encounter       blood glucose monitoring test strip    LEE CONTOUR    100 each    Use to test blood sugars 3 times daily or as directed.    Type 2 diabetes mellitus without complication, without long-term current use of insulin (H)       calcium carbonate 1250 MG tablet    OS-PEDRO PABLO 500 mg [place]. Ca     Take 500 mg by mouth 2 times daily Reported on 4/19/2017        capsaicin 0.025 % Crea cream    ZOSTRIX    60 g    To affected area 2-3 times daily as needed.    Diabetic neuropathy with neurologic complication (H)       * ciclopirox 0.77 % cream    LOPROX    90 g    Apply topically 2 times daily To feet and toenails.    Tinea pedis of both feet       * ciclopirox 0.77 % cream    LOPROX    270 g    Apply topically 2  times daily    Tinea pedis of both feet       donepezil 10 MG tablet    ARICEPT    90 tablet    Take 1 tablet (10 mg) by mouth At Bedtime    Late onset Alzheimer's disease without behavioral disturbance       DULoxetine 30 MG EC capsule    CYMBALTA    30 capsule    Take 1 capsule (30 mg) by mouth daily    Idiopathic peripheral neuropathy       latanoprost 0.005 % ophthalmic solution    XALATAN     1 drop At Bedtime Reported on 4/19/2017        losartan 100 MG tablet    COZAAR    90 tablet    Take 1 tablet (100 mg) by mouth daily    Essential hypertension       memantine 10 MG tablet    NAMENDA    180 tablet    Take 1 tablet (10 mg) by mouth 2 times daily    Late onset Alzheimer's disease without behavioral disturbance       metFORMIN 500 MG tablet    GLUCOPHAGE    360 tablet    Take 2 tablets (1,000 mg) by mouth 2 times daily (with meals)    Hyperglycemia       MULTIVITAMIN PO      Reported on 4/19/2017        salicylic acid 17 % Liqd     1 Bottle    Externally apply 1 dose * topically daily To left foot wart.    Plantar warts       tolnaftate 1 % Aerp    ANTIFUNGAL SPRAY POWDER    130 g    Apply to feet twice daily    Tinea pedis of both feet       VITAMIN D3 PO      Take by mouth daily Reported on 4/19/2017        * Notice:  This list has 2 medication(s) that are the same as other medications prescribed for you. Read the directions carefully, and ask your doctor or other care provider to review them with you.

## 2018-05-23 ENCOUNTER — OFFICE VISIT (OUTPATIENT)
Dept: FAMILY MEDICINE | Facility: CLINIC | Age: 83
End: 2018-05-23
Payer: COMMERCIAL

## 2018-05-23 VITALS
WEIGHT: 148 LBS | BODY MASS INDEX: 22.69 KG/M2 | OXYGEN SATURATION: 99 % | DIASTOLIC BLOOD PRESSURE: 71 MMHG | SYSTOLIC BLOOD PRESSURE: 120 MMHG | TEMPERATURE: 97.5 F | HEART RATE: 71 BPM

## 2018-05-23 DIAGNOSIS — I10 ESSENTIAL HYPERTENSION: Primary | ICD-10-CM

## 2018-05-23 DIAGNOSIS — G30.1 LATE ONSET ALZHEIMER'S DISEASE WITHOUT BEHAVIORAL DISTURBANCE (H): ICD-10-CM

## 2018-05-23 DIAGNOSIS — F02.80 LATE ONSET ALZHEIMER'S DISEASE WITHOUT BEHAVIORAL DISTURBANCE (H): ICD-10-CM

## 2018-05-23 DIAGNOSIS — R60.0 BILATERAL LEG EDEMA: ICD-10-CM

## 2018-05-23 DIAGNOSIS — B35.1 DERMATOPHYTOSIS OF NAIL: ICD-10-CM

## 2018-05-23 DIAGNOSIS — G60.9 IDIOPATHIC PERIPHERAL NEUROPATHY: ICD-10-CM

## 2018-05-23 RX ORDER — DULOXETIN HYDROCHLORIDE 30 MG/1
30 CAPSULE, DELAYED RELEASE ORAL DAILY
Qty: 90 CAPSULE | Refills: 1 | Status: SHIPPED | OUTPATIENT
Start: 2018-05-23 | End: 2019-05-29

## 2018-05-23 ASSESSMENT — ENCOUNTER SYMPTOMS
APPETITE CHANGE: 0
UNEXPECTED WEIGHT CHANGE: 0
LIGHT-HEADEDNESS: 0
WEAKNESS: 0
CONFUSION: 1
DYSPHORIC MOOD: 0
GASTROINTESTINAL NEGATIVE: 1
RESPIRATORY NEGATIVE: 1
CARDIOVASCULAR NEGATIVE: 1
FATIGUE: 0
ACTIVITY CHANGE: 0
FEVER: 0
NERVOUS/ANXIOUS: 0

## 2018-05-23 NOTE — PATIENT INSTRUCTIONS
1.  Try to limit salt in diet    2.  Elevate legs for 1 hour in the middle of the day    3.  Keep walking.    4.  Tight socks during the day, off at night.    5.  Continue duloxetine for neuropathy.

## 2018-05-23 NOTE — MR AVS SNAPSHOT
After Visit Summary   5/23/2018    Jaydon Alegria    MRN: [id number]           Patient Information     Date Of Birth          1935        Visit Information        Provider Department      5/23/2018 2:20 PM Steffen Bingham MD [facility]        Today's Diagnoses     Essential hypertension    -  1    Idiopathic peripheral neuropathy        Late onset Alzheimer's disease without behavioral disturbance        Bilateral leg edema        Dermatophytosis of nail          Care Instructions    1.  Try to limit salt in diet    2.  Elevate legs for 1 hour in the middle of the day    3.  Keep walking.    4.  Tight socks during the day, off at night.    5.  Continue duloxetine for neuropathy.          Follow-ups after your visit        Additional Services     PODIATRY/FOOT & ANKLE SURGERY REFERRAL - INTERNAL       Your provider has referred you to: JOHN: [facility]: [address] [place] [address] Patient Scheduling Line: [phone] option 1 (scheduling and directions)    Please be aware that coverage of these services is subject to the terms and limitations of your health insurance plan.  Call member services at your health plan with any benefit or coverage questions.      Please bring the following to your appointment:  >>   Any x-rays, CTs or MRIs which have been performed.  Contact the facility where they were done to arrange for  prior to your scheduled appointment.    >>   List of current medications   >>   This referral request   >>   Any documents/labs given to you for this referral                  Who to contact     Please call your clinic at [phone] to:    Ask questions about your health    Make or cancel appointments    Discuss your medicines    Learn about your test results    Speak to your doctor            Additional Information About Your Visit        [employer] Information     [employer] gives you secure access to your electronic  health record. If you see a primary care provider, you can also send messages to your care team and make appointments. If you have questions, please call your primary care clinic.  If you do not have a primary care provider, please call [phone] and they will assist you.      [employer] is an electronic gateway that provides easy, online access to your medical records. With [employer], you can request a clinic appointment, read your test results, renew a prescription or communicate with your care team.     To access your existing account, please contact your [facility] or call [phone] for assistance.        Care EveryWhere ID     This is your Care EveryWhere ID. This could be used by other organizations to access your [place] medical records  [id number]        Your Vitals Were     Pulse Temperature Pulse Oximetry BMI (Body Mass Index)          71 97.5  F (36.4  C) (Oral) 99% 22.69 kg/m2         Blood Pressure from Last 3 Encounters:   05/23/18 120/71   04/11/18 115/58   03/07/18 108/70    Weight from Last 3 Encounters:   05/23/18 148 lb (67.1 kg)   04/11/18 145 lb (65.8 kg)   03/07/18 144 lb 6.4 oz (65.5 kg)              We Performed the Following     PODIATRY/FOOT & ANKLE SURGERY REFERRAL - INTERNAL          Where to get your medicines      These medications were sent to [facility] [address] [place] [facility] [address] [facility] [address]  [address] [address]     Phone:  [phone]     DULoxetine 30 MG EC capsule          Primary Care Provider Office Phone # Fax #    Steffen Bingham [phone]       [address] [place] [address]        Equal Access to Services     JEREMIAH RAMOS : Hadii dmitri Dennis, wachipda luqadaha, qaybta kaalmada blanca hemphill. So [facility] [phone].    ATENCIÓN: Si habla español, tiene a elliott disposición servicios gratuitos de asistencia lingüística. Llame al  [phone].    We comply with applicable federal civil rights laws and [place] laws. We do not discriminate on the basis of race, color, national origin, age, disability, sex, sexual orientation, or gender identity.            Thank you!     Thank you for choosing \[facility]\"" [facility]  for your care. Our goal is always to provide you with excellent care. Hearing back from our patients is one way we can continue to improve our services. Please take a few minutes to complete the written survey that you may receive in the mail after your visit with us. Thank you!             Your Updated Medication List - Protect others around you: Learn how to safely use, store and throw away your medicines at www.disposemymeds.org.          This list is accurate as of 5/23/18  3:15 PM.  Always use your most recent med list.                   Brand Name Dispense Instructions for use Diagnosis    ALPRAZolam 0.5 MG tablet    XANAX    45 tablet    1/2 tablet as needed for anxiety    Anxiety       amLODIPine 10 MG tablet    NORVASC    90 tablet    Take 1 tablet (10 mg) by mouth daily    Essential hypertension       atorvastatin 40 MG tablet    LIPITOR    90 tablet    Take 1 tablet (40 mg) by mouth daily    Type 2 diabetes mellitus without complication, without long-term current use of insulin (H)       B-D ULTRA-FINE 33 LANCETS Misc     100 each    Use 2-4 times daily as directed    Type 2 diabetes mellitus without complication, without long-term current use of insulin (H)       Bacitracin-Polymyx-Balwinder-HC 1 % Oint     1 Tube    Apply to affected areas  daily with dressing changes    Open toe wound, initial encounter       blood glucose monitoring test strip    LEE CONTOUR    100 each    Use to test blood sugars 3 times daily or as directed.    Type 2 diabetes mellitus without complication, without long-term current use of insulin (H)       calcium carbonate 500 MG tablet    OS-PEDRO PABLO 500 mg [place]. Ca     Take 500 mg by mouth 2  times daily Reported on 4/19/2017        capsaicin 0.025 % Crea cream    ZOSTRIX    60 g    To affected area 2-3 times daily as needed.    Diabetic neuropathy with neurologic complication (H)       * ciclopirox 0.77 % cream    LOPROX    90 g    Apply topically 2 times daily To feet and toenails.    Tinea pedis of both feet       * ciclopirox 0.77 % cream    LOPROX    270 g    Apply topically 2 times daily    Tinea pedis of both feet       donepezil 10 MG tablet    ARICEPT    90 tablet    Take 1 tablet (10 mg) by mouth At Bedtime    Late onset Alzheimer's disease without behavioral disturbance       DULoxetine 30 MG EC capsule    CYMBALTA    90 capsule    Take 1 capsule (30 mg) by mouth daily    Idiopathic peripheral neuropathy       latanoprost 0.005 % ophthalmic solution    XALATAN     1 drop At Bedtime Reported on 4/19/2017        losartan 100 MG tablet    COZAAR    90 tablet    Take 1 tablet (100 mg) by mouth daily    Essential hypertension       memantine 10 MG tablet    NAMENDA    180 tablet    Take 1 tablet (10 mg) by mouth 2 times daily    Late onset Alzheimer's disease without behavioral disturbance       metFORMIN 500 MG tablet    GLUCOPHAGE    360 tablet    Take 2 tablets (1,000 mg) by mouth 2 times daily (with meals)    Hyperglycemia       MULTIVITAMIN PO      Reported on 4/19/2017        salicylic acid 17 % Liqd     1 Bottle    Externally apply 1 dose * topically daily To left foot wart.    Plantar warts       tolnaftate 1 % Aerp    ANTIFUNGAL SPRAY POWDER    130 g    Apply to feet twice daily    Tinea pedis of both feet       VITAMIN D3 PO      Take by mouth daily Reported on 4/19/2017        * Notice:  This list has 2 medication(s) that are the same as other medications prescribed for you. Read the directions carefully, and ask your doctor or other care provider to review them with you.

## 2018-05-23 NOTE — NURSING NOTE
Diabetic Foot Screen:  Any complaints of increased pain or numbness ? No   Is there a foot ulcer now or a history of foot ulcer? No   Does the foot have an abnormal shape? No   Are the nails thick, too long or ingrown?  YES Thick nails  Are there any redness or open areas?  YES Red area on bottom R foot toe           Sensation Testing done at all points on the diagram with monofilament     Right Foot: Sensation Normal at all points  Left Foot: Sensation Absent at the following points , 4 and 5     Risk Category: 1- Loss of protective sensation with normal appearing foot (no weakness, deformity, callous, pre-ulcer or h/o ulceration)  Performed by Mele Johnston, CMA

## 2018-06-04 ENCOUNTER — TRANSFERRED RECORDS (OUTPATIENT)
Dept: HEALTH INFORMATION MANAGEMENT | Facility: CLINIC | Age: 83
End: 2018-06-04

## 2018-06-11 ENCOUNTER — TRANSFERRED RECORDS (OUTPATIENT)
Dept: HEALTH INFORMATION MANAGEMENT | Facility: CLINIC | Age: 83
End: 2018-06-11

## 2018-06-12 ENCOUNTER — TELEPHONE (OUTPATIENT)
Dept: FAMILY MEDICINE | Facility: CLINIC | Age: 83
End: 2018-06-12

## 2018-06-12 NOTE — TELEPHONE ENCOUNTER
[facility] phone call message-patient reporting a symptom:     Symptom: Walking and talking more slowly than usual, difficulty speaking / opening mouth, leg weakness, facial drooping    Same Day Visit Offered: Yes, declined- not sure what she should do    Additional comments: Patient's wife Camille calling to advise that patient failed stress test yesterday (6/11/18), took a nap after stress test and developed symptoms noted above after nap.    OK to leave message on voice mail? unknown    Primary language: English      needed? No    Call taken on June 12, 2018 at 2:44 PM by Monisha Schwartz

## 2018-06-12 NOTE — TELEPHONE ENCOUNTER
Pt was warm transferred to RN.     Patients wife states patient had a stress test yesterday 6/11 and last night stated he started walking and talking more slowly.     Currently Pt states both legs are painful and weak. Pt's wife states he is speaking slowly and appears to have difficulty speaking. RN asked patient's wife to have him squeeze her hands at the same time-pt had equal grasps. RN asked him to stick out his tongue. Pts wife reported tongue drooped to the right and pt has slight facial droop with drooling.     RN instructed wife should call 911 immediately so patient can be evaluated in the hospital for a stroke. Pt's wife stated she would drive him. RN recommended an ambulance to get him there faster. Wife stated they lived close and would bring him right away    Rebekah Flores RN

## 2018-06-13 NOTE — TELEPHONE ENCOUNTER
RN called patient's wife to follow up on patient.     Pt's wife stated patient is admitted at [facility] for a stroke. Pt's wife didn't know any details on what kind of stroke but stated patient was doing fine but still having difficulty speaking    Message routed to PCP     Rebekah Flores RN

## 2018-06-14 ENCOUNTER — TELEPHONE (OUTPATIENT)
Dept: FAMILY MEDICINE | Facility: CLINIC | Age: 83
End: 2018-06-14

## 2018-06-14 NOTE — TELEPHONE ENCOUNTER
[facility] phone call message- general phone call:    Reason for call: Patient's wife called and said Patient had a stroke and is in the hospital and when they discharge him they want him to go to a rehabilitation center for a week or so. She would like a recommendation as to where to have him go.  Please call her on her cell phone #.    Return call needed: Yes    OK to leave a message on voice mail? Yes    Primary language: English      needed? No    Call taken on June 14, 2018 at 9:44 AM by Yoly Antonio

## 2018-06-19 NOTE — TELEPHONE ENCOUNTER
Called pt wife Camille. She said that her  was discharged from [facility] on Saturday, 6/16/18 and went to [facility] in [place]. She said that it is close to their house and they really like the PT there. All is going well and they are happy with the facility.  She will check in when he is ready to discharge.    Anny Huffman  /Care Coordinator

## 2018-06-26 ENCOUNTER — MEDICAL CORRESPONDENCE (OUTPATIENT)
Dept: HEALTH INFORMATION MANAGEMENT | Facility: CLINIC | Age: 83
End: 2018-06-26

## 2018-06-26 ENCOUNTER — TELEPHONE (OUTPATIENT)
Dept: FAMILY MEDICINE | Facility: CLINIC | Age: 83
End: 2018-06-26

## 2018-06-26 NOTE — TELEPHONE ENCOUNTER
Returned call to home care PT to give verbal orders per protocol as requested. PT verbalized understanding.    Rebekah Flores RN

## 2018-06-26 NOTE — TELEPHONE ENCOUNTER
[facility] phone call message - order or referral request for patient:     Order or referral being requested: orders for home care PT for gait training, strengthening and balance. The plan is for 4 visits in a month      Additional Comments: please call Jesse    OK to leave a message on voice mail? Yes    Primary language: English      needed? No    Call taken on June 26, 2018 at 1:59 PM by Judith Sifuentes

## 2018-06-27 ENCOUNTER — OFFICE VISIT (OUTPATIENT)
Dept: PHARMACY | Facility: CLINIC | Age: 83
End: 2018-06-27
Payer: COMMERCIAL

## 2018-06-27 ENCOUNTER — OFFICE VISIT (OUTPATIENT)
Dept: FAMILY MEDICINE | Facility: CLINIC | Age: 83
End: 2018-06-27
Payer: COMMERCIAL

## 2018-06-27 VITALS
OXYGEN SATURATION: 97 % | TEMPERATURE: 98.7 F | HEART RATE: 74 BPM | DIASTOLIC BLOOD PRESSURE: 74 MMHG | BODY MASS INDEX: 21.99 KG/M2 | RESPIRATION RATE: 16 BRPM | WEIGHT: 143.4 LBS | SYSTOLIC BLOOD PRESSURE: 131 MMHG

## 2018-06-27 DIAGNOSIS — I63.81 LEFT SIDED LACUNAR STROKE (H): Primary | ICD-10-CM

## 2018-06-27 DIAGNOSIS — I63.9 CEREBROVASCULAR ACCIDENT (CVA), UNSPECIFIED MECHANISM (H): Primary | ICD-10-CM

## 2018-06-27 DIAGNOSIS — F43.22 ADJUSTMENT DISORDER WITH ANXIOUS MOOD: ICD-10-CM

## 2018-06-27 DIAGNOSIS — G30.1 LATE ONSET ALZHEIMER'S DISEASE WITHOUT BEHAVIORAL DISTURBANCE (H): ICD-10-CM

## 2018-06-27 DIAGNOSIS — I10 ESSENTIAL HYPERTENSION: ICD-10-CM

## 2018-06-27 DIAGNOSIS — F02.80 LATE ONSET ALZHEIMER'S DISEASE WITHOUT BEHAVIORAL DISTURBANCE (H): ICD-10-CM

## 2018-06-27 RX ORDER — CLOPIDOGREL BISULFATE 75 MG/1
75 TABLET ORAL DAILY
Qty: 90 TABLET | Refills: 3 | Status: SHIPPED | OUTPATIENT
Start: 2018-06-27 | End: 2018-07-13

## 2018-06-27 RX ORDER — ATORVASTATIN CALCIUM 80 MG/1
80 TABLET, FILM COATED ORAL DAILY
Qty: 90 TABLET | Refills: 3 | Status: SHIPPED | OUTPATIENT
Start: 2018-06-27 | End: 2018-12-18

## 2018-06-27 RX ORDER — CLOPIDOGREL BISULFATE 75 MG/1
75 TABLET ORAL
COMMUNITY
Start: 2018-06-17 | End: 2018-06-27

## 2018-06-27 RX ORDER — LORAZEPAM 0.5 MG/1
0.5 TABLET ORAL EVERY 8 HOURS PRN
Qty: 20 TABLET | Refills: 0 | Status: SHIPPED | OUTPATIENT
Start: 2018-06-27 | End: 2019-02-22

## 2018-06-27 NOTE — PROGRESS NOTES
"      HPI:       Jaydon Alegria is a 83 year old who presents for the following  Patient presents with:  RECHECK: Hospital Follow up, 6/12/18 Post Stroke,         Concern:     1.Hospital Follow up- Post CVA                  {  :002241}   {+++++++  Additional Concerns  (FM):849964}  Problem, Medication and Allergy Lists were {Reviewed Lists:505310::\"reviewed and are current.\"}  Patient is {New/Established:624878::\"an established patient of this clinic.\"}         Review of Systems:   Review of Systems          Physical Exam:   Patient Vitals for the past 24 hrs:   BP Temp Temp src Pulse Resp SpO2 Weight   06/27/18 1503 131/74 98.7  F (37.1  C) Tympanic 74 16 97 % 143 lb 6.4 oz (65 kg)     Body mass index is 21.99 kg/(m^2).  {SMI VITALS OPTIONS:670580::\"Vitals were reviewed and were normal\"}     Physical Exam  {  Detailed Ortho and mental status exam:253728}    Results:     {Result Choices:624880}  Assessment and Plan     {Assessment/Plan Pick List :568899}  There are no discontinued medications.  Options for treatment and follow-up care were reviewed with the patient. Jaydon Alegria  engaged in the decision making process and verbalized understanding of the options discussed and agreed with the final plan.    Steffen Bingham MD      "

## 2018-06-27 NOTE — MR AVS SNAPSHOT
After Visit Summary   6/27/2018    Jaydon Alegria    MRN: [id number]           Patient Information     Date Of Birth          1935        Visit Information        Provider Department      6/27/2018 3:20 PM Steffen Bingham MD [facility]        Today's Diagnoses     Left sided lacunar stroke (H)    -  1    Late onset Alzheimer's disease without behavioral disturbance        Essential hypertension        Adjustment disorder with anxious mood          Care Instructions    Use lorazepam sparingly for severe anxiety.    Stop alprazolam.    Continue aspirin until July 7.    Continue clopidogrel.          Follow-ups after your visit        Follow-up notes from your care team     Return in about 3 weeks (around 7/18/2018).      Who to contact     Please call your clinic at [phone] to:    Ask questions about your health    Make or cancel appointments    Discuss your medicines    Learn about your test results    Speak to your doctor            Additional Information About Your Visit        MyChart Information     [employer] gives you secure access to your electronic health record. If you see a primary care provider, you can also send messages to your care team and make appointments. If you have questions, please call your primary care clinic.  If you do not have a primary care provider, please call [phone] and they will assist you.      [employer] is an electronic gateway that provides easy, online access to your medical records. With [employer], you can request a clinic appointment, read your test results, renew a prescription or communicate with your care team.     To access your existing account, please contact your [facility] or call [phone] for assistance.        Care EveryWhere ID     This is your Care EveryWhere ID. This could be used by other organizations to access your [place] medical records  [id number]        Your  Vitals Were     Pulse Temperature Respirations Pulse Oximetry BMI (Body Mass Index)       74 98.7  F (37.1  C) (Tympanic) 16 97% 21.99 kg/m2        Blood Pressure from Last 3 Encounters:   06/27/18 131/74   05/23/18 120/71   04/11/18 115/58    Weight from Last 3 Encounters:   06/27/18 143 lb 6.4 oz (65 kg)   05/23/18 148 lb (67.1 kg)   04/11/18 145 lb (65.8 kg)              Today, you had the following     No orders found for display         Today's Medication Changes          These changes are accurate as of 6/27/18  4:25 PM.  If you have any questions, ask your nurse or doctor.               Start taking these medicines.        Dose/Directions    LORazepam 0.5 MG tablet   Commonly known as:  ATIVAN   Used for:  Adjustment disorder with anxious mood   Started by:  Steffen Bingham MD        Dose:  0.5 mg   Take 1 tablet (0.5 mg) by mouth every 8 hours as needed for anxiety   Quantity:  20 tablet   Refills:  0         Stop taking these medicines if you haven't already. Please contact your care team if you have questions.     ALPRAZolam 0.5 MG tablet   Commonly known as:  XANAX   Stopped by:  Steffen Bingham MD                Where to get your medicines      Some of these will need a paper prescription and others can be bought over the counter.  Ask your nurse if you have questions.     Bring a paper prescription for each of these medications     LORazepam 0.5 MG tablet                Primary Care Provider Office Phone # Fax #    Steffen Bingham [phone]       [address] [place], [place] [id number]        Equal Access to Services     DINAH RAMOS : Hadii dmitri mendosa Soisacc, waaxda luqadaha, qaybta kaalmada adeegyada, waxay idisacha cazares. So [facility] [phone].    ATENCIÓN: Si habla español, tiene a elliott disposición servicios gratuitos de asistencia lingüística. Llame al [phone].    We comply with applicable federal civil rights laws and [place] laws. We do not  discriminate on the basis of race, color, national origin, age, disability, sex, sexual orientation, or gender identity.            Thank you!     Thank you for choosing [facility]  for your care. Our goal is always to provide you with excellent care. Hearing back from our patients is one way we can continue to improve our services. Please take a few minutes to complete the written survey that you may receive in the mail after your visit with us. Thank you!             Your Updated Medication List - Protect others around you: Learn how to safely use, store and throw away your medicines at www.disposemymeds.org.          This list is accurate as of 6/27/18  4:25 PM.  Always use your most recent med list.                   Brand Name Dispense Instructions for use Diagnosis    amLODIPine 10 MG tablet    NORVASC    90 tablet    Take 1 tablet (10 mg) by mouth daily    Essential hypertension       atorvastatin 40 MG tablet    LIPITOR    90 tablet    Take 1 tablet (40 mg) by mouth daily    Type 2 diabetes mellitus without complication, without long-term current use of insulin (H)       B-D ULTRA-FINE 33 LANCETS Misc     100 each    Use 2-4 times daily as directed    Type 2 diabetes mellitus without complication, without long-term current use of insulin (H)       Bacitracin-Polymyx-Balwinder-HC 1 % Oint     1 Tube    Apply to affected areas  daily with dressing changes    Open toe wound, initial encounter       blood glucose monitoring test strip    LEE CONTOUR    100 each    Use to test blood sugars 3 times daily or as directed.    Type 2 diabetes mellitus without complication, without long-term current use of insulin (H)       calcium carbonate 500 MG tablet    OS-PEDRO PABLO 500 mg [place]. [place]     Take 500 mg by mouth 2 times daily Reported on 4/19/2017        capsaicin 0.025 % Crea cream    ZOSTRIX    60 g    To affected area 2-3 times daily as needed.    Diabetic neuropathy with neurologic complication (H)        * ciclopirox 0.77 % cream    LOPROX    90 g    Apply topically 2 times daily To feet and toenails.    Tinea pedis of both feet       * ciclopirox 0.77 % cream    LOPROX    270 g    Apply topically 2 times daily    Tinea pedis of both feet       donepezil 10 MG tablet    ARICEPT    90 tablet    Take 1 tablet (10 mg) by mouth At Bedtime    Late onset Alzheimer's disease without behavioral disturbance       DULoxetine 30 MG EC capsule    CYMBALTA    90 capsule    Take 1 capsule (30 mg) by mouth daily    Idiopathic peripheral neuropathy       latanoprost 0.005 % ophthalmic solution    XALATAN     1 drop At Bedtime Reported on 4/19/2017        LORazepam 0.5 MG tablet    ATIVAN    20 tablet    Take 1 tablet (0.5 mg) by mouth every 8 hours as needed for anxiety    Adjustment disorder with anxious mood       losartan 100 MG tablet    COZAAR    90 tablet    Take 1 tablet (100 mg) by mouth daily    Essential hypertension       memantine 10 MG tablet    NAMENDA    180 tablet    Take 1 tablet (10 mg) by mouth 2 times daily    Late onset Alzheimer's disease without behavioral disturbance       metFORMIN 500 MG tablet    GLUCOPHAGE    360 tablet    Take 2 tablets (1,000 mg) by mouth 2 times daily (with meals)    Hyperglycemia       MULTIVITAMIN PO      Reported on 4/19/2017        salicylic acid 17 % Liqd     1 Bottle    Externally apply 1 dose * topically daily To left foot wart.    Plantar warts       tolnaftate 1 % Aerp    ANTIFUNGAL SPRAY POWDER    130 g    Apply to feet twice daily    Tinea pedis of both feet       VITAMIN D3 PO      Take by mouth daily Reported on 4/19/2017        * Notice:  This list has 2 medication(s) that are the same as other medications prescribed for you. Read the directions carefully, and ask your doctor or other care provider to review them with you.

## 2018-06-27 NOTE — PROGRESS NOTES
Clinical Pharmacy Note     Jaydon Romero was referred by Dr. Bingham for an annual wellness encounter       MEDICATION REVIEW:  Discussed all medication indications, dosage and effectiveness, adverse effects, and adherence with patient/caregiver.    Pt had meds with them: no  Pt had med list with them: no  Pt was knowledgeable about meds: yes  Medications set up by: self  Medications administered by someone else (e.g., LTCF): No  Pt uses a medication box or automated dispenser: no  Called pharmacy to obtain or clarify med list:  no  Called HHN or LTC[facility] to obtain or clarify med list:  no    Medication Discrepancies  Medications on EMR med list that pt is NOT taking:  none  Medications pt IS taking that are NOT on EMR med list (e.g., from specialist, hospital): none  OTC meds/ dietary supplements pt taking on own that are NOT on EMR med list:  none  Dosage listed differently than how patient is taking: none  Frequency listed differently than how patient is taking: none  Duplicate medication on list (two occurrences of the same medication):  none  TOTAL NUMBER OF MEDICATION DISCREPANCIES:  0  ______________________________________________________________________      Subjective:  Jaydon Romero is here today hospital follow up   Hospital:  [facility]   Date of Admission: 6/12/18  Date of Discharge: 6/16/18  Reason(s) for Admission: stroke    1. Cerebrovascular accident (CVA), unspecified mechanism (H)  Taking ASA 81 mg daily   Atorvastain 80 mg once daily   Plavix 75 mg daily           Patient reported being compliant all of the time   Patient reports no side effects.      Objective:    There were no vitals taken for this visit.  GFR Estimate   Date Value Ref Range Status   03/07/2018 >90 >60.0 mL/min/1.7 m2 Final   01/18/2017 >90  Non  GFR Calc   >60 mL/min/1.7m2 Final   04/21/2016 115.9 mL/min/1.7 m2 Final     GFR Estimate If Black   Date Value Ref Range Status   03/07/2018 >90 >60.0 mL/min/1.7 m2  Final   01/18/2017 >90   GFR Calc   >60 mL/min/1.7m2 Final   04/21/2016 140.3 mL/min/1.7 m2 Final       Patient Active Problem List   Diagnosis     Essential hypertension     Peripheral neuropathy     HL (hearing loss), bilateral     Glaucoma of both eyes     Hyperlipidemia LDL goal <130     Chronic constipation     Late onset Alzheimer's disease without behavioral disturbance     Type 2 diabetes mellitus without complication (H)     Aortic stenosis     ACP (advance care planning)     Benign essential hypertension     Type 2 diabetes mellitus without complication, without long-term current use of insulin (H)     SOCIAL HISTORY:   reports that he has quit smoking. He has never used smokeless tobacco. He reports that he drinks alcohol. He reports that he does not use illicit drugs.    Current Outpatient Prescriptions   Medication Sig Dispense Refill     atorvastatin (LIPITOR) 80 MG tablet Take 1 tablet (80 mg) by mouth daily 90 tablet 3     clopidogrel (PLAVIX) 75 MG tablet Take 1 tablet (75 mg) by mouth daily 90 tablet 3     amLODIPine (NORVASC) 10 MG tablet Take 1 tablet (10 mg) by mouth daily 90 tablet 3     B-D ULTRA-FINE 33 LANCETS MISC Use 2-4 times daily as directed 100 each 12     Bacitracin-Polymyx-Balwinder-HC 1 % OINT Apply to affected areas  daily with dressing changes (Patient not taking: Reported on 7/19/2017) 1 Tube 1     blood glucose monitoring (LEE CONTOUR) test strip Use to test blood sugars 3 times daily or as directed. 100 each 11     calcium carbonate (OS-PEDRO PABLO 500 MG [place]. [place]) 500 MG tablet Take 500 mg by mouth 2 times daily Reported on 4/19/2017       capsaicin (ZOSTRIX) 0.025 % CREA cream To affected area 2-3 times daily as needed. (Patient not taking: Reported on 7/19/2017) 60 g 5     Cholecalciferol (VITAMIN D3 PO) Take by mouth daily Reported on 4/19/2017       ciclopirox (LOPROX) 0.77 % cream Apply topically 2 times daily 270 g 2     ciclopirox (LOPROX) 0.77 % cream Apply  topically 2 times daily To feet and toenails. 90 g 6     donepezil (ARICEPT) 10 MG tablet Take 1 tablet (10 mg) by mouth At Bedtime 90 tablet 3     DULoxetine (CYMBALTA) 30 MG EC capsule Take 1 capsule (30 mg) by mouth daily 90 capsule 1     latanoprost (XALATAN) 0.005 % ophthalmic solution 1 drop At Bedtime Reported on 4/19/2017       LORazepam (ATIVAN) 0.5 MG tablet Take 1 tablet (0.5 mg) by mouth every 8 hours as needed for anxiety 20 tablet 0     losartan (COZAAR) 100 MG tablet Take 1 tablet (100 mg) by mouth daily 90 tablet 3     memantine (NAMENDA) 10 MG tablet Take 1 tablet (10 mg) by mouth 2 times daily 180 tablet 3     metFORMIN (GLUCOPHAGE) 500 MG tablet Take 2 tablets (1,000 mg) by mouth 2 times daily (with meals) 360 tablet 3     Multiple Vitamins-Minerals (MULTIVITAMIN PO) Reported on 4/19/2017       salicylic acid 17 % LIQD Externally apply 1 dose * topically daily To left foot wart. 1 Bottle 3     tolnaftate (ANTIFUNGAL SPRAY POWDER) 1 % AERP Apply to feet twice daily 130 g 3     Allergies   Allergen Reactions     No Clinical Screening - See Comments      PN: LW CM1: >>> NO CONTRAST ADVERSE REACTION <<<     Reaction :       Environmental factors that may impact patient: none.    There were no vitals taken for this visit.    Drug Therapy Assessment:  Drug therapy problems identified:     I have reviewed All medications taken by the Jaydon Romero.  Medications were reviewed and assessed for indicated, effective, safe and convenient. Drug therapy problem identified today listed described below:        1. Cerebrovascular accident (CVA), unspecified mechanism (H)    Has been compliant with new therapy.  He has no barriers in taking the medication and organizing these on his own.          Drug Therapy Plan and Follow up:    Plan  Medication list was updated today to reflect the medication reconciliation performed.       1. Cerebrovascular accident (CVA), unspecified mechanism (H)    - atorvastatin  (LIPITOR) 80 MG tablet; Take 1 tablet (80 mg) by mouth daily  Dispense: 90 tablet; Refill: 3  - clopidogrel (PLAVIX) 75 MG tablet; Take 1 tablet (75 mg) by mouth daily  Dispense: 90 tablet; Refill: 3        Follow up: with PCP as directed   Patient was provided with written instructions/medication list via AVS        Options for treatment and/or follow-up care were reviewed with the patient. Patient was engaged and actively involved in the decision making process.  Jaydon Alegria verbalized understanding of the options discussed and was satisfied with the final plan.      Steffen Otero  was provided my action today in clinic and was available for supervision during this visit and is the authorizing prescriber for this visit through the pharmacist collaborative practice agreement.      Remington De Luna, Pharm.D   20 minute encounter                   ,  ,  ,  ,  ,  ,  ,  ,     ,  ,  ,  ,  ,  ,  ,  ,     ,  ,  ,  ,  ,  ,  ,  ,     ,  ,  ,

## 2018-06-27 NOTE — MR AVS SNAPSHOT
After Visit Summary   6/27/2018    Jaydon Alegria    MRN: [id number]           Patient Information     Date Of Birth          1935        Visit Information        Provider Department      6/27/2018 3:00 PM [facility]        Today's Diagnoses     Cerebrovascular accident (CVA), unspecified mechanism (H)    -  1       Follow-ups after your visit        Your next 10 appointments already scheduled     Jul 18, 2018  2:40 PM CDT   Return Visit with [facility] ([facility])    [address],  [address]  [place] [address]   [phone]              Who to contact     Please call your clinic at [phone] to:    Ask questions about your health    Make or cancel appointments    Discuss your medicines    Learn about your test results    Speak to your doctor            Additional Information About Your Visit        MyChart Information     [employer] gives you secure access to your electronic health record. If you see a primary care provider, you can also send messages to your care team and make appointments. If you have questions, please call your primary care clinic.  If you do not have a primary care provider, please call [phone] and they will assist you.      [employer] is an electronic gateway that provides easy, online access to your medical records. With [employer], you can request a clinic appointment, read your test results, renew a prescription or communicate with your care team.     To access your existing account, please contact your [facility] or call [phone] for assistance.        Care EveryWhere ID     This is your Care EveryWhere ID. This could be used by other organizations to access your [facility] medical records  [id number]         Blood Pressure from Last 3 Encounters:   06/27/18 131/74   05/23/18 120/71   04/11/18 115/58    Weight from  Last 3 Encounters:   06/27/18 143 lb 6.4 oz (65 kg)   05/23/18 148 lb (67.1 kg)   04/11/18 145 lb (65.8 kg)              Today, you had the following     No orders found for display         Today's Medication Changes          These changes are accurate as of 6/27/18 11:59 PM.  If you have any questions, ask your nurse or doctor.               Start taking these medicines.        Dose/Directions    LORazepam 0.5 MG tablet   Commonly known as:  ATIVAN   Used for:  Adjustment disorder with anxious mood   Started by:  Steffen Bingham MD        Dose:  0.5 mg   Take 1 tablet (0.5 mg) by mouth every 8 hours as needed for anxiety   Quantity:  20 tablet   Refills:  0         These medicines have changed or have updated prescriptions.        Dose/Directions    atorvastatin 80 MG tablet   Commonly known as:  LIPITOR   This may have changed:    - medication strength  - how much to take   Used for:  Cerebrovascular accident (CVA), unspecified mechanism (H)   Changed by:  [facility]        Dose:  80 mg   Take 1 tablet (80 mg) by mouth daily   Quantity:  90 tablet   Refills:  3       clopidogrel 75 MG tablet   Commonly known as:  PLAVIX   This may have changed:  when to take this   Used for:  Cerebrovascular accident (CVA), unspecified mechanism (H)   Changed by:  [facility]        Dose:  75 mg   Take 1 tablet (75 mg) by mouth daily   Quantity:  90 tablet   Refills:  3         Stop taking these medicines if you haven't already. Please contact your care team if you have questions.     ALPRAZolam 0.5 MG tablet   Commonly known as:  XANAX   Stopped by:  Steffen Bingham MD                Where to get your medicines      These medications were sent to [facility] [id number] [place] [facility] [address] [place] [address]  [address] [address]     Phone:  [phone]     atorvastatin 80 MG tablet    clopidogrel 75 MG tablet         Some of these will need a paper prescription and others can be bought over  the counter.  Ask your nurse if you have questions.     Bring a paper prescription for each of these medications     LORazepam 0.5 MG tablet                Primary Care Provider Office Phone # Fax #    Steffen Bingham [phone]       [address] [place] [address]        Equal Access to Services     JEREMIAH RAMOS : David rizvi ku hadasho Soomaali, waaxda luqadaha, qaybta kaalmada adeegyada, waxay osiris beckcabreraindu cazares. So [facility] [phone].    ATENCIÓN: Si habla español, tiene a elliott disposición servicios gratuitos de asistencia lingüística. Bobby al [phone].    We comply with applicable federal civil rights laws and [place] laws. We do not discriminate on the basis of race, color, national origin, age, disability, sex, sexual orientation, or gender identity.            Thank you!     Thank you for choosing [facility]  for your care. Our goal is always to provide you with excellent care. Hearing back from our patients is one way we can continue to improve our services. Please take a few minutes to complete the written survey that you may receive in the mail after your visit with us. Thank you!             Your Updated Medication List - Protect others around you: Learn how to safely use, store and throw away your medicines at www.disposemymeds.org.          This list is accurate as of 6/27/18 11:59 PM.  Always use your most recent med list.                   Brand Name Dispense Instructions for use Diagnosis    amLODIPine 10 MG tablet    NORVASC    90 tablet    Take 1 tablet (10 mg) by mouth daily    Essential hypertension       aspirin 81 MG EC tablet      Take 81 mg by mouth Take for 21 days        atorvastatin 80 MG tablet    LIPITOR    90 tablet    Take 1 tablet (80 mg) by mouth daily    Cerebrovascular accident (CVA), unspecified mechanism (H)       B-D ULTRA-FINE 33 LANCETS Misc     100 each    Use 2-4 times daily as directed    Type 2  diabetes mellitus without complication, without long-term current use of insulin (H)       Bacitracin-Polymyx-Balwinder-HC 1 % Oint     1 Tube    Apply to affected areas  daily with dressing changes    Open toe wound, initial encounter       blood glucose monitoring test strip    LEE CONTOUR    100 each    Use to test blood sugars 3 times daily or as directed.    Type 2 diabetes mellitus without complication, without long-term current use of insulin (H)       calcium carbonate 500 MG tablet    OS-PEDRO PABLO 500 mg [place]. Ca     Take 500 mg by mouth 2 times daily Reported on 4/19/2017        capsaicin 0.025 % Crea cream    ZOSTRIX    60 g    To affected area 2-3 times daily as needed.    Diabetic neuropathy with neurologic complication (H)       * ciclopirox 0.77 % cream    LOPROX    90 g    Apply topically 2 times daily To feet and toenails.    Tinea pedis of both feet       * ciclopirox 0.77 % cream    LOPROX    270 g    Apply topically 2 times daily    Tinea pedis of both feet       clopidogrel 75 MG tablet    PLAVIX    90 tablet    Take 1 tablet (75 mg) by mouth daily    Cerebrovascular accident (CVA), unspecified mechanism (H)       donepezil 10 MG tablet    ARICEPT    90 tablet    Take 1 tablet (10 mg) by mouth At Bedtime    Late onset Alzheimer's disease without behavioral disturbance       DULoxetine 30 MG EC capsule    CYMBALTA    90 capsule    Take 1 capsule (30 mg) by mouth daily    Idiopathic peripheral neuropathy       latanoprost 0.005 % ophthalmic solution    XALATAN     1 drop At Bedtime Reported on 4/19/2017        LORazepam 0.5 MG tablet    ATIVAN    20 tablet    Take 1 tablet (0.5 mg) by mouth every 8 hours as needed for anxiety    Adjustment disorder with anxious mood       memantine 10 MG tablet    NAMENDA    180 tablet    Take 1 tablet (10 mg) by mouth 2 times daily    Late onset Alzheimer's disease without behavioral disturbance       metFORMIN 500 MG tablet    GLUCOPHAGE    360 tablet    Take 2 tablets  (1,000 mg) by mouth 2 times daily (with meals)    Hyperglycemia       MULTIVITAMIN PO      Reported on 4/19/2017        salicylic acid 17 % Liqd     1 Bottle    Externally apply 1 dose * topically daily To left foot wart.    Plantar warts       tolnaftate 1 % Aerp    ANTIFUNGAL SPRAY POWDER    130 g    Apply to feet twice daily    Tinea pedis of both feet       VITAMIN D3 PO      Take by mouth daily Reported on 4/19/2017        * Notice:  This list has 2 medication(s) that are the same as other medications prescribed for you. Read the directions carefully, and ask your doctor or other care provider to review them with you.

## 2018-06-27 NOTE — PROGRESS NOTES
Please see pharmacy note dated same day.    Medications added the med list  -  ASA 81 mg once daily for 21 days  - atorvastatin (LIPITOR) 80 MG tablet; Take 1 tablet (80 mg) by mouth daily  Dispense: 90 tablet; Refill: 3  - clopidogrel (PLAVIX) 75 MG tablet; Take 1 tablet (75 mg) by mouth daily  Dispense: 90 tablet; Refill: 3    [facility] PharmRobertD.

## 2018-06-27 NOTE — PROGRESS NOTES
Hospitalization Follow-up Visit         \[facility] [place] [facility]\""       Hospital Follow-up Visit:    Hospital:  [facility]   Date of Admission: 6/12/18  Date of Discharge: 6/16/18  Reason(s) for Admission: stroke            Problems taking medications regularly:  None       Post Discharge Medication Reconciliation: discharge medications reconciled and changed, per note/orders (see AVS).       Problems adhering to non-medication therapy:  None       Medications reviewed by: by PharmD    Summary of hospitalization:  [facility] discharge summary reviewed  See outside records, reviewed in Care Everywhere  Diagnostic Tests/Treatments reviewed.  Follow up needed: none  Other Healthcare Providers Involved in Patient s Care:         Homecare and Physical Therapy  Update since discharge: fluctuating course. Good days and bad days.  Walking with assistance.  Lots of problems with anxiety attacks.  Responds to alprazolam prn but then is sleepy the rest of the day.    Plan of care communicated with patient and family                        Review of Systems:   CONSTITUTIONAL: no fatigue, no unexpected change in weight  SKIN: no worrisome rashes, no worrisome moles, no worrisome lesions  EYES: no acute vision problems or changes  ENT: no ear problems, no mouth problems, no throat problems  RESP: no significant cough, no shortness of breath  CV: no chest pain, no palpitations, no new or worsening peripheral edema  GI: no nausea, no vomiting, no constipation, no diarrhea            Physical Exam:     Vitals:    06/27/18 1503   BP: 131/74   Pulse: 74   Resp: 16   Temp: 98.7  F (37.1  C)   TempSrc: Tympanic   SpO2: 97%   Weight: 143 lb 6.4 oz (65 kg)     Body mass index is 21.99 kg/(m^2).    Physical Exam   Constitutional: He appears well-developed and well-nourished. No distress.   Eyes: EOM are normal. Pupils are equal, round, and reactive to light.   Neck: No JVD present. No thyromegaly present.   Cardiovascular: Normal rate, regular rhythm  and intact distal pulses.  Exam reveals no friction rub.    Murmur (3/6 CLINT aortic) heard.  Pulmonary/Chest: Effort normal and breath sounds normal.   Abdominal: Soft.   Musculoskeletal: Normal range of motion. He exhibits no edema.   Lymphadenopathy:     He has no cervical adenopathy.   Neurological: He is alert. He has normal reflexes. A cranial nerve deficit (facial nerve weakness on R) is present. He exhibits normal muscle tone.   Mild decreased strength on R   Skin: He is not diaphoretic.   Psychiatric:   + memory loss.  + aphasia - speaks little and with disrupted fluency.   Vitals reviewed.               Results:     Results from [facility] reviewed    Assessment and Plan      Jaydon Romero was seen today for recheck.    Diagnoses and all orders for this visit:    Left sided lacunar stroke (H) - continue clopidogrel and other Rx.  -     Home Care Nursing Referral (non-[place])    Late onset Alzheimer's disease without behavioral disturbance - continue Rx.    Essential hypertension - at goal    Adjustment disorder with anxious mood - not responding well to alprazolam.  Will try low dose lorazepam for extreme anxiety.  Need to establish new behavioral and neurologic baseline after this L sided putamen and internal capsular stroke.  Consider switching duloxetine to SSRI in future for anxiety.  Also can consider buspirone.  -     LORazepam (ATIVAN) 0.5 MG tablet; Take 1 tablet (0.5 mg) by mouth every 8 hours as needed for anxiety            E&M code to be billed if TCM cannot be: 18631    Type of decision making: Moderate complexity (47638)      Options for treatment and follow-up care were reviewed with the patient  Jaydon Gonzalesvaldemar Alegria   engaged in the decision making process and verbalized understanding of the options discussed and agreed with the final plan.      Steffen Bingham MD

## 2018-06-27 NOTE — PATIENT INSTRUCTIONS
Use lorazepam sparingly for severe anxiety.    Stop alprazolam.    Continue aspirin until July 7.    Continue clopidogrel.

## 2018-06-28 ENCOUNTER — TELEPHONE (OUTPATIENT)
Dept: FAMILY MEDICINE | Facility: CLINIC | Age: 83
End: 2018-06-28

## 2018-06-28 NOTE — TELEPHONE ENCOUNTER
[facility] phone call message - order or referral request for patient:     Order or referral being requested: order for speech therapy and social work assessment for the patients spouse for community resources.      Additional Comments: please call Jesse    OK to leave a message on voice mail? Yes    Primary language: English      needed? No    Call taken on June 28, 2018 at 11:14 AM by Judith Sifuentes

## 2018-06-29 ENCOUNTER — TELEPHONE (OUTPATIENT)
Dept: FAMILY MEDICINE | Facility: CLINIC | Age: 83
End: 2018-06-29

## 2018-06-29 NOTE — TELEPHONE ENCOUNTER
Returned call to home care OT to give verbal orders per protocol as requested. OT verbalized understanding.    Bianca Amador RN

## 2018-06-29 NOTE — TELEPHONE ENCOUNTER
Written order for speech therapy was faxed to [facility].  They should not need a standing order.  Order given to clinic nurse to follow through and contact Rony Huffman  /Care Coordinator

## 2018-06-29 NOTE — TELEPHONE ENCOUNTER
[facility] phone call message - order or referral request for patient:     Order or referral being requested: Occupational therapy 6 times a month for one month     Additional Comments: For self care and home safety.    OK to leave a message on voice mail? Yes    Primary language: English      needed? No    Call taken on June 29, 2018 at 11:46 AM by Geno Bynum

## 2018-07-03 NOTE — TELEPHONE ENCOUNTER
Order for ST put in by PCP. RN called and gave verbal order for speech therapy.     Rebekah Flores RN

## 2018-07-05 ENCOUNTER — TELEPHONE (OUTPATIENT)
Dept: FAMILY MEDICINE | Facility: CLINIC | Age: 83
End: 2018-07-05

## 2018-07-05 DIAGNOSIS — I10 ESSENTIAL HYPERTENSION: ICD-10-CM

## 2018-07-05 RX ORDER — LOSARTAN POTASSIUM 100 MG/1
100 TABLET ORAL DAILY
Qty: 90 TABLET | Refills: 3 | Status: SHIPPED | OUTPATIENT
Start: 2018-07-05

## 2018-07-05 NOTE — TELEPHONE ENCOUNTER
[facility] phone call message- medication clarification/question:    Full Medication Name: losartan (COZAAR) 100 MG tablet   Dose:       Question: Patient is requesting a 90 day supply     Pharmacy confirmed as       [id number] [place] [facility] - [address] [address]  Phone: [phone] Fax: [phone]    : Yes    OK to leave a message on voice mail? Yes    Call taken on July 5, 2018 at 1:48 PM by Susie Reyes

## 2018-07-11 ENCOUNTER — MEDICAL CORRESPONDENCE (OUTPATIENT)
Dept: HEALTH INFORMATION MANAGEMENT | Facility: CLINIC | Age: 83
End: 2018-07-11

## 2018-07-11 ENCOUNTER — DOCUMENTATION ONLY (OUTPATIENT)
Dept: FAMILY MEDICINE | Facility: CLINIC | Age: 83
End: 2018-07-11

## 2018-07-11 NOTE — PROGRESS NOTES
"When opening a documentation only encounter, be sure to enter in \"Chief Complaint\" Forms and in \" Comments\" Title of form, description if needed.    Jaydon Romero is a 83 year old  male  Form received via: Fax  Form now resides in: Provider Ready    Marianna Silva CMA      Form has been completed by provider.     Form sent out via: Fax to [phone]  Patient informed: No, Reason:fax confirmation confirmed  Output date: July 11, 2018    Marianna Silva CMA      **Please close the encounter**                  "

## 2018-07-12 ENCOUNTER — TELEPHONE (OUTPATIENT)
Dept: FAMILY MEDICINE | Facility: CLINIC | Age: 83
End: 2018-07-12

## 2018-07-12 ENCOUNTER — DOCUMENTATION ONLY (OUTPATIENT)
Dept: FAMILY MEDICINE | Facility: CLINIC | Age: 83
End: 2018-07-12

## 2018-07-12 NOTE — PROGRESS NOTES
"When opening a documentation only encounter, be sure to enter in \"Chief Complaint\" Forms and in \" Comments\" Title of form, description if needed.    Jaydon Romero is a 83 year old  male  Form received via: Fax  Form now resides in: Provider Ready    Marianna Silva CMA          Form has been completed by provider.     Form sent out via: Fax to [phone]  Patient informed: No, Reason:fax confirmation confirmed  Output date: July 12, 2018    Marianna Silva CMA      **Please close the encounter**            "

## 2018-07-12 NOTE — TELEPHONE ENCOUNTER
[facility] phone call message - order or referral request for patient:     Order or referral being requested: Speech therapy 2x/week for 3 weeks to increase functional, expressive, and receptive language skills.    Additional Comments: none    OK to leave a message on voice mail? Yes    Primary language: English      needed? No    Call taken on July 12, 2018 at 3:47 PM by Monisha Schwartz

## 2018-07-12 NOTE — PROGRESS NOTES
"When opening a documentation only encounter, be sure to enter in \"Chief Complaint\" Forms and in \" Comments\" Title of form, description if needed.    Jaydon Romero is a 83 year old  male  Form received via: Fax  Form now resides in: Provider Ready    Marianna Silva CMA        Form has been completed by provider.     Form sent out via: Fax to [phone]  Patient informed: No, Reason:fax confirmation confirmed  Output date: July 12, 2018    Marianna Silva CMA      **Please close the encounter**              "

## 2018-07-13 DIAGNOSIS — I63.9 CEREBROVASCULAR ACCIDENT (CVA), UNSPECIFIED MECHANISM (H): ICD-10-CM

## 2018-07-13 NOTE — TELEPHONE ENCOUNTER
[facility] phone call message- patient requesting a refill:    Full Medication Name: clopidogrel (PLAVIX) 75 MG tablet    Dose: Take 1 tablet (75 mg) by mouth daily - Oral    Pharmacy confirmed as     [id number] - [place], [place] - [facility] [place] [address]  [address] [address]  Phone: [phone] Fax: [phone]  : Yes    Additional Comments: Patient's wife requesting 90 day supply of med listed above. Appears that prescription written for 90 day supply. Patient's wife advised that bottle of med that patient currently has at home is 30 day supply.    OK to leave a message on voice mail? Yes    Primary language: English      needed? No    Call taken on July 13, 2018 at 10:09 AM by Monisha Schwartz

## 2018-07-13 NOTE — TELEPHONE ENCOUNTER

## 2018-07-13 NOTE — TELEPHONE ENCOUNTER
"Per PCP \"    OK (Routing comment)     \"    RN called Michelle and gave verbal order. Left detailed VM on secure line    Rebekah Flores RN    "

## 2018-07-16 RX ORDER — CLOPIDOGREL BISULFATE 75 MG/1
75 TABLET ORAL DAILY
Qty: 90 TABLET | Refills: 3 | Status: SHIPPED | OUTPATIENT
Start: 2018-07-16 | End: 2018-10-12

## 2018-07-17 ENCOUNTER — DOCUMENTATION ONLY (OUTPATIENT)
Dept: OTHER | Facility: CLINIC | Age: 83
End: 2018-07-17

## 2018-07-17 DIAGNOSIS — Z71.89 ACP (ADVANCE CARE PLANNING): Chronic | ICD-10-CM

## 2018-07-19 ENCOUNTER — DOCUMENTATION ONLY (OUTPATIENT)
Dept: FAMILY MEDICINE | Facility: CLINIC | Age: 83
End: 2018-07-19

## 2018-07-19 NOTE — PROGRESS NOTES
Form has been completed by provider.     Form sent out via: Fax to [phone]   Patient informed: No, Reason:fax confirmed  Output date: July 19, 2018    Marianna Silva CMA      **Please close the encounter**

## 2018-07-23 ENCOUNTER — DOCUMENTATION ONLY (OUTPATIENT)
Dept: FAMILY MEDICINE | Facility: CLINIC | Age: 83
End: 2018-07-23

## 2018-07-23 NOTE — PROGRESS NOTES
Form has been completed by provider.     Form sent out via: Fax to [phone]  Patient informed: No, Reason:fax confirmed  Output date: July 23, 2018    Marianna Silva CMA      **Please close the encounter**

## 2018-08-02 ENCOUNTER — TELEPHONE (OUTPATIENT)
Dept: FAMILY MEDICINE | Facility: CLINIC | Age: 83
End: 2018-08-02

## 2018-08-02 ENCOUNTER — DOCUMENTATION ONLY (OUTPATIENT)
Dept: FAMILY MEDICINE | Facility: CLINIC | Age: 83
End: 2018-08-02

## 2018-08-02 ENCOUNTER — OFFICE VISIT (OUTPATIENT)
Dept: FAMILY MEDICINE | Facility: CLINIC | Age: 83
End: 2018-08-02
Payer: COMMERCIAL

## 2018-08-02 VITALS
BODY MASS INDEX: 22.35 KG/M2 | DIASTOLIC BLOOD PRESSURE: 69 MMHG | TEMPERATURE: 97.5 F | SYSTOLIC BLOOD PRESSURE: 114 MMHG | OXYGEN SATURATION: 100 % | HEART RATE: 69 BPM | WEIGHT: 145.8 LBS

## 2018-08-02 DIAGNOSIS — S51.819A SKIN TEAR OF FOREARM WITHOUT COMPLICATION, INITIAL ENCOUNTER: ICD-10-CM

## 2018-08-02 DIAGNOSIS — R23.4 SKIN THINNING: Primary | ICD-10-CM

## 2018-08-02 RX ORDER — NEOMYCIN/BACITRACIN/POLYMYXINB 3.5-400-5K
OINTMENT (GRAM) TOPICAL PRN
Qty: 30 G | Refills: 1 | Status: SHIPPED | OUTPATIENT
Start: 2018-08-02 | End: 2019-05-29

## 2018-08-02 RX ORDER — NEOMYCIN/BACITRACIN/POLYMYXINB 3.5-400-5K
OINTMENT (GRAM) TOPICAL 4 TIMES DAILY
Qty: 30 G | Refills: 1 | Status: SHIPPED | OUTPATIENT
Start: 2018-08-02 | End: 2018-08-02

## 2018-08-02 NOTE — PROGRESS NOTES
HPI       Jaydon Alegria is a 83 year old  who presents for   Chief Complaint   Patient presents with     WOUND CARE     Left arm wounds that come because of thin skin     Has skin tear on left arm, occurs frequently as per wife, bleeding stops spontaneously, no known significant trauma.  Patient has Alzheimer's.  Will return in September for Medicare wellness visit with primary Dr. Steffen Bingham.  Here with wife who sees Dr. Natasha Bingham for primary care.    Good nutrition as per wife, last albumin was wnl and TSH wnl.  No bleeding while in clinic, no signs of bleeding in joints.  Does have scattered ecchymosis and thinning skin but no other open wounds at this time.    No fever, no chills, no purulent discharge from wound.  No shortness of breath or chest pain, no abdominal pain or nausea/vomiting.     +++++++    Problem, Medication and Allergy Lists were reviewed and updated if needed..    Patient is an established patient of this clinic..         Review of Systems:   Review of Systems  See HPI       Physical Exam:     Vitals:    08/02/18 1449   BP: 114/69   Pulse: 69   Temp: 97.5  F (36.4  C)   TempSrc: Oral   SpO2: 100%   Weight: 145 lb 12.8 oz (66.1 kg)     Body mass index is 22.35 kg/(m^2).  Vitals were reviewed and were normal     Physical Exam   Constitutional: He appears well-developed and well-nourished. No distress.   HENT:   Head: Atraumatic.   Eyes: EOM are normal. Right eye exhibits no discharge. Left eye exhibits no discharge.   Neck: Normal range of motion. Neck supple.   Cardiovascular: Normal rate and regular rhythm.    No murmur heard.  Pulmonary/Chest: Effort normal and breath sounds normal. No respiratory distress. He has no wheezes. He has no rales.   Musculoskeletal: He exhibits no edema.   Neurological: He is alert.   Skin: Skin is warm and dry. He is not diaphoretic.        Skin tears: No active bleeding, no purulent discharge, no surrounding erythema or tenderness  or edema.  Skin overall is thin with some scattered ecchymoses, no signs of bleeding into joints, no other open wounds noted or reported.         Results:   Results from last visit:  Office Visit on 03/07/2018   Component Date Value Ref Range Status     Hemoglobin A1C 03/07/2018 6.5* 4.1 - 5.7 % Final     Urea Nitrogen 03/07/2018 19.9  7.0 - 21.0 mg/dL Final     Calcium 03/07/2018 10.2* 8.5 - 10.1 mg/dL Final     Chloride 03/07/2018 99.0  98.0 - 110.0 mmol/L Final     Carbon Dioxide 03/07/2018 29.6  20.0 - 32.0 mmol/L Final     Creatinine 03/07/2018 0.8  0.7 - 1.3 mg/dL Final     Glucose 03/07/2018 112.1* 70.0 - 99.0 mg'dL Final     Potassium 03/07/2018 4.5  3.3 - 4.5 mmol/dL Final     Sodium 03/07/2018 137.8  132.6 - 141.4 mmol/L Final     GFR Estimate 03/07/2018 >90  >60.0 mL/min/1.7 m2 Final     GFR Estimate If Black 03/07/2018 >90  >60.0 mL/min/1.7 m2 Final     Creatinine Urine 03/07/2018 190  mg/dL Final     Albumin Urine mg/L 03/07/2018 22  mg/L Final     Albumin Urine mg/g Cr 03/07/2018 11.63  0 - 17 mg/g Cr Final       Assessment and Plan        1. Skin thinning  2. Skin tear of forearm without complication, initial encounter  No signs of superimposed infection, no active bleeding, likely due to thin skin 2/2 aging, also on plavix.  Has Alzheimer's and may be more prone to injuries, no signs of overt trauma, no falls reported.   Recommend ointment, antibacterial or vaseline with overlying bandage until epithelization occurs.   - neomycin-bacitracin-polymyxin (NEOSPORIN) 5-400-5000 ointment; Apply topically as needed With dressing changes  Dispense: 30 g; Refill: 1       There are no discontinued medications.    Options for treatment and follow-up care were reviewed with the patient. Jaydon Alegria  engaged in the decision making process and verbalized understanding of the options discussed and agreed with the final plan.    Gonzalo Dias MD

## 2018-08-02 NOTE — TELEPHONE ENCOUNTER
RN returned call to [facility]iko who stated pt has been discharged with home care and speech therapy. Discharged with regular diet and thin liquids. Pt is being seen today for wounds by Dr. Arun Flores RN

## 2018-08-02 NOTE — PROGRESS NOTES
Form has been completed by provider.     Form sent out via: Fax to [phone]  Patient informed: No, Reason:fax confirmed  Output date: August 2, 2018    Marianna Silva CMA      **Please close the encounter**

## 2018-08-02 NOTE — TELEPHONE ENCOUNTER
[facility] phone call message - order or referral request for patient:     Order or referral being requested: Speech therapy      Additional Comments: Discontinue home care agency orders.  Also an FYI: patient has 2 wounds on left arm and the skin keeps taring/bleeding.  Advised to make clinic appointment.    OK to leave a message on voice mail? Yes    Primary language: English      needed? No    Call taken on August 2, 2018 at 11:59 AM by Yoly Antonio

## 2018-08-02 NOTE — MR AVS SNAPSHOT
After Visit Summary   8/2/2018    Jaydon Alegria    MRN: [id number]           Patient Information     Date Of Birth          1935        Visit Information        Provider Department      8/2/2018 2:40 PM Gonzalo Dias MD [facility]        Today's Diagnoses     Skin thinning    -  1    Arm wound, left, initial encounter          Care Instructions    Here is the plan from today's visit    1. Skin thinning  2. Arm wound, left, initial encounter  Likely due to thin skin, accidental bruising, and being on plavix.  No signs of infection at this time, wound was not bleeding further, very superficial.    Recommend wound care with neosporin ointment and bandages  - Neosporin sent to pharmacy  If notice any bleeding into joints please come to clinic to be seen    Please call or return to clinic if your symptoms don't go away.    Follow up plan  Please make a clinic appointment for follow up with your primary physician Steffen Bingham MD next month (scheduled) for medicare wellness exam.    Thank you for coming to [facility] today.  Lab Testing:  **If you had lab testing today and your results are reassuring or normal they will be mailed to you or sent through [employer] within 7 days.   **If the lab tests need quick action we will call you with the results.  The phone number we will call with results is # [phone] (home) none (work). If this is not the best number please call our clinic and change the number.  Medication Refills:  If you need any refills please call your pharmacy and they will contact us.   If you need to  your refill at a new pharmacy, please contact the new pharmacy directly. The new pharmacy will help you get your medications transferred faster.   Scheduling:  If you have any concerns about today's visit or wish to schedule another appointment please call our office during normal business hours [phone] (8-5:00 M-F)  If a  referral was made to a [facility] and you don't get a call from central scheduling please call [phone].  If a Mammogram was ordered for you at [facility] call [phone] to schedule or change your appointment.  If you had an XRay/CT/Ultrasound/MRI ordered the number is [phone] to schedule or change your radiology appointment.   Medical Concerns:  If you have urgent medical concerns please call [phone] at any time of the day.    Gonzalo Dias MD            Follow-ups after your visit        Your next 10 appointments already scheduled     Sep 12, 2018  2:40 PM CDT   Return Visit with Steffen Bingham MD   [facility]    [address],  [address]  [place] [address]   [phone]              Who to contact     Please call your clinic at [phone] to:    Ask questions about your health    Make or cancel appointments    Discuss your medicines    Learn about your test results    Speak to your doctor            Additional Information About Your Visit        [employer] Information     [employer] gives you secure access to your electronic health record. If you see a primary care provider, you can also send messages to your care team and make appointments. If you have questions, please call your primary care clinic.  If you do not have a primary care provider, please call [phone] and they will assist you.      [employer] is an electronic gateway that provides easy, online access to your medical records. With [employer], you can request a clinic appointment, read your test results, renew a prescription or communicate with your care team.     To access your existing account, please contact your [facility] or call [phone] for assistance.        Care EveryWhere ID     This is your Care EveryWhere ID. This could be used by other organizations to access your [place] medical records  [id number]         Your Vitals Were     Pulse Temperature Pulse Oximetry BMI (Body Mass Index)          69 97.5  F (36.4  C) (Oral) 100% 22.35 kg/m2         Blood Pressure from Last 3 Encounters:   08/02/18 114/69   06/27/18 131/74   05/23/18 120/71    Weight from Last 3 Encounters:   08/02/18 145 lb 12.8 oz (66.1 kg)   06/27/18 143 lb 6.4 oz (65 kg)   05/23/18 148 lb (67.1 kg)              Today, you had the following     No orders found for display         Today's Medication Changes          These changes are accurate as of 8/2/18  3:20 PM.  If you have any questions, ask your nurse or doctor.               Start taking these medicines.        Dose/Directions    neomycin-bacitracin-polymyxin 5-400-5000 ointment   Used for:  Arm wound, left, initial encounter, Skin thinning   Started by:  Gonzalo Dias MD        Apply topically 4 times daily   Quantity:  30 g   Refills:  1            Where to get your medicines      These medications were sent to [place] [address] [place] [address]  [address] [place] [address]     Phone:  [phone]     neomycin-bacitracin-polymyxin 5-400-5000 ointment                Primary Care Provider Office Phone # Fax #    Steffen Bingham [phone]       [address] [place] [address]        Equal Access to Services     DINAH RAMOS AH: Hadii dmitri harrison hadsarao Soisacc, waaxda luqadaha, qaybta kaalmada evelinyada, blanca cazares. So [facility] [phone].    ATENCIÓN: Si habla español, tiene a elliott disposición servicios gratuitos de asistencia lingüística. Llame al [phone].    We comply with applicable federal civil rights laws and [place] laws. We do not discriminate on the basis of race, color, national origin, age, disability, sex, sexual orientation, or gender identity.            Thank you!     Thank you for choosing [facility]  for your care. Our goal is always to provide you with  excellent care. Hearing back from our patients is one way we can continue to improve our services. Please take a few minutes to complete the written survey that you may receive in the mail after your visit with us. Thank you!             Your Updated Medication List - Protect others around you: Learn how to safely use, store and throw away your medicines at www.disposemymeds.org.          This list is accurate as of 8/2/18  3:20 PM.  Always use your most recent med list.                   Brand Name Dispense Instructions for use Diagnosis    amLODIPine 10 MG tablet    NORVASC    90 tablet    Take 1 tablet (10 mg) by mouth daily    Essential hypertension       atorvastatin 80 MG tablet    LIPITOR    90 tablet    Take 1 tablet (80 mg) by mouth daily    Cerebrovascular accident (CVA), unspecified mechanism (H)       B-D ULTRA-FINE 33 LANCETS Misc     100 each    Use 2-4 times daily as directed    Type 2 diabetes mellitus without complication, without long-term current use of insulin (H)       Bacitracin-Polymyx-Balwinder-HC 1 % Oint     1 Tube    Apply to affected areas  daily with dressing changes    Open toe wound, initial encounter       blood glucose monitoring test strip    LEE CONTOUR    100 each    Use to test blood sugars 3 times daily or as directed.    Type 2 diabetes mellitus without complication, without long-term current use of insulin (H)       calcium carbonate 500 MG tablet    OS-PEDRO PABLO 500 mg [place]. [place]     Take 500 mg by mouth 2 times daily Reported on 4/19/2017        capsaicin 0.025 % Crea cream    ZOSTRIX    60 g    To affected area 2-3 times daily as needed.    Diabetic neuropathy with neurologic complication (H)       * ciclopirox 0.77 % cream    LOPROX    90 g    Apply topically 2 times daily To feet and toenails.    Tinea pedis of both feet       * ciclopirox 0.77 % cream    LOPROX    270 g    Apply topically 2 times daily    Tinea pedis of both feet       clopidogrel 75 MG tablet    PLAVIX    90 tablet     Take 1 tablet (75 mg) by mouth daily    Cerebrovascular accident (CVA), unspecified mechanism (H)       donepezil 10 MG tablet    ARICEPT    90 tablet    Take 1 tablet (10 mg) by mouth At Bedtime    Late onset Alzheimer's disease without behavioral disturbance       DULoxetine 30 MG EC capsule    CYMBALTA    90 capsule    Take 1 capsule (30 mg) by mouth daily    Idiopathic peripheral neuropathy       latanoprost 0.005 % ophthalmic solution    XALATAN     1 drop At Bedtime Reported on 4/19/2017        LORazepam 0.5 MG tablet    ATIVAN    20 tablet    Take 1 tablet (0.5 mg) by mouth every 8 hours as needed for anxiety    Adjustment disorder with anxious mood       losartan 100 MG tablet    COZAAR    90 tablet    Take 1 tablet (100 mg) by mouth daily    Essential hypertension       memantine 10 MG tablet    NAMENDA    180 tablet    Take 1 tablet (10 mg) by mouth 2 times daily    Late onset Alzheimer's disease without behavioral disturbance       metFORMIN 500 MG tablet    GLUCOPHAGE    360 tablet    Take 2 tablets (1,000 mg) by mouth 2 times daily (with meals)    Hyperglycemia       MULTIVITAMIN PO      Reported on 4/19/2017        neomycin-bacitracin-polymyxin 5-400-5000 ointment     30 g    Apply topically 4 times daily    Arm wound, left, initial encounter, Skin thinning       salicylic acid 17 % Liqd     1 Bottle    Externally apply 1 dose * topically daily To left foot wart.    Plantar warts       timolol hemihydrate 0.5 % Soln ophthalmic solution    BETIMOL     1 drop        tolnaftate 1 % Aerp    ANTIFUNGAL SPRAY POWDER    130 g    Apply to feet twice daily    Tinea pedis of both feet       VITAMIN D3 PO      Take by mouth daily Reported on 4/19/2017        * Notice:  This list has 2 medication(s) that are the same as other medications prescribed for you. Read the directions carefully, and ask your doctor or other care provider to review them with you.

## 2018-08-02 NOTE — PATIENT INSTRUCTIONS
Here is the plan from today's visit    1. Skin thinning  2. Arm wound, left, initial encounter  Likely due to thin skin, accidental bruising, and being on plavix.  No signs of infection at this time, wound was not bleeding further, very superficial.    Recommend wound care with neosporin ointment and bandages  - Neosporin sent to pharmacy  If notice any bleeding into joints please come to clinic to be seen    Please call or return to clinic if your symptoms don't go away.    Follow up plan  Please make a clinic appointment for follow up with your primary physician Steffen Bingham MD next month (scheduled) for medicare wellness exam.    Thank you for coming to [facility] today.  Lab Testing:  **If you had lab testing today and your results are reassuring or normal they will be mailed to you or sent through [employer] within 7 days.   **If the lab tests need quick action we will call you with the results.  The phone number we will call with results is # [phone] (home) none (work). If this is not the best number please call our clinic and change the number.  Medication Refills:  If you need any refills please call your pharmacy and they will contact us.   If you need to  your refill at a new pharmacy, please contact the new pharmacy directly. The new pharmacy will help you get your medications transferred faster.   Scheduling:  If you have any concerns about today's visit or wish to schedule another appointment please call our office during normal business hours [phone] (8-5:00 M-F)  If a referral was made to a [facility] and you don't get a call from central scheduling please call [phone].  If a Mammogram was ordered for you at [facility] call [phone] to schedule or change your appointment.  If you had an XRay/CT/Ultrasound/MRI ordered the number is [phone] to schedule or change your radiology appointment.   Medical Concerns:  If you have urgent medical  concerns please call [phone] at any time of the day.    Gonzalo Dias MD

## 2018-08-02 NOTE — PROGRESS NOTES
Preceptor Attestation:   Patient seen, evaluated and discussed with the resident.   I have verified the content of the note, which accurately reflects my assessment of the patient and the plan of care.   Supervising Physician:  Sukhjinder Conteh MD

## 2018-08-08 ENCOUNTER — DOCUMENTATION ONLY (OUTPATIENT)
Dept: FAMILY MEDICINE | Facility: CLINIC | Age: 83
End: 2018-08-08

## 2018-08-08 ENCOUNTER — MEDICAL CORRESPONDENCE (OUTPATIENT)
Dept: HEALTH INFORMATION MANAGEMENT | Facility: CLINIC | Age: 83
End: 2018-08-08

## 2018-08-08 NOTE — PROGRESS NOTES
Form has been completed by provider.     Form sent out via: Fax to [phone]  Patient informed: No, Reason:fax confirmed  Output date: August 8, 2018    Marianna Silva CMA      **Please close the encounter**

## 2018-08-09 ENCOUNTER — DOCUMENTATION ONLY (OUTPATIENT)
Dept: FAMILY MEDICINE | Facility: CLINIC | Age: 83
End: 2018-08-09

## 2018-08-09 NOTE — PROGRESS NOTES
Form has been completed by provider.     Form sent out via: Fax to [phone]  Patient informed: No, Reason:fax confirmed  Output date: August 9, 2018    Marianna Silva CMA      **Please close the encounter**

## 2018-08-16 ENCOUNTER — DOCUMENTATION ONLY (OUTPATIENT)
Dept: FAMILY MEDICINE | Facility: CLINIC | Age: 83
End: 2018-08-16

## 2018-08-16 NOTE — PROGRESS NOTES
"When opening a documentation only encounter, be sure to enter in \"Chief Complaint\" Forms and in \" Comments\" Title of form, description if needed.    Jaydon Romero is a 83 year old  male  Form received via: Fax  Form now resides in: Provider Ready    Mele Johnston CMA                Form has been completed by provider.     Form sent out via: Fax to [facility] at Fax Number: [phone]  Patient informed: No, Reason:fax confirmation  Output date: August 16, 2018    Mele Johnston CMA      **Please close the encounter**          "

## 2018-08-28 ENCOUNTER — DOCUMENTATION ONLY (OUTPATIENT)
Dept: FAMILY MEDICINE | Facility: CLINIC | Age: 83
End: 2018-08-28

## 2018-08-28 NOTE — PROGRESS NOTES
"When opening a documentation only encounter, be sure to enter in \"Chief Complaint\" Forms and in \" Comments\" Title of form, description if needed.    Jaydon Romero is a 83 year old  male  Form received via: Fax  Form now resides in: Provider Ready      Form has been completed by provider.     Form sent out via: Fax to [facility] at Fax Number: [phone]  Patient informed: NA  Output date: August 28, 2018    Antonina Arroyo CMA      **Please close the encounter**      "

## 2018-08-31 ENCOUNTER — OFFICE VISIT (OUTPATIENT)
Dept: PODIATRY | Facility: CLINIC | Age: 83
End: 2018-08-31
Payer: COMMERCIAL

## 2018-08-31 VITALS — HEART RATE: 75 BPM | DIASTOLIC BLOOD PRESSURE: 74 MMHG | OXYGEN SATURATION: 98 % | SYSTOLIC BLOOD PRESSURE: 160 MMHG

## 2018-08-31 DIAGNOSIS — E11.51 DIABETES MELLITUS WITH PERIPHERAL VASCULAR DISEASE (H): ICD-10-CM

## 2018-08-31 DIAGNOSIS — B35.1 DERMATOPHYTOSIS OF NAIL: Primary | ICD-10-CM

## 2018-08-31 DIAGNOSIS — B07.0 PLANTAR WART: ICD-10-CM

## 2018-08-31 DIAGNOSIS — E11.49 TYPE II OR UNSPECIFIED TYPE DIABETES MELLITUS WITH NEUROLOGICAL MANIFESTATIONS, NOT STATED AS UNCONTROLLED(250.60) (H): ICD-10-CM

## 2018-08-31 DIAGNOSIS — B35.3 TINEA PEDIS OF BOTH FEET: ICD-10-CM

## 2018-08-31 DIAGNOSIS — L98.9 LEG SKIN LESION, LEFT: ICD-10-CM

## 2018-08-31 PROCEDURE — 99213 OFFICE O/P EST LOW 20 MIN: CPT | Mod: 25 | Performed by: PODIATRIST

## 2018-08-31 PROCEDURE — 17110 DESTRUCTION B9 LES UP TO 14: CPT | Performed by: PODIATRIST

## 2018-08-31 NOTE — PATIENT INSTRUCTIONS
Thanks for coming today.  Ortho/[facility] Medicine Clinic  [address] [place] [address]    To schedule future appointments in Ortho Clinic, you may call [phone].    To schedule ordered imaging by your Provider: Call [facility] at [phone]    Invaluable available online at:   [email or web address]    Please call if any further questions or concerns [phone] and ask for the Orthopedic Department. Clinic hours 8 am to 5 pm.    Return to clinic if symptoms worsen.

## 2018-08-31 NOTE — PROGRESS NOTES
Past Medical History:   Diagnosis Date     Depressive disorder      Diabetes (H)      Hypertension      Skin cancer 8/25/2016     Patient Active Problem List   Diagnosis     Essential hypertension     Peripheral neuropathy     HL (hearing loss), bilateral     Glaucoma of both eyes     Hyperlipidemia LDL goal <130     Chronic constipation     Late onset Alzheimer's disease without behavioral disturbance     Type 2 diabetes mellitus without complication (H)     Aortic stenosis     ACP (advance care planning)     Benign essential hypertension     Type 2 diabetes mellitus without complication, without long-term current use of insulin (H)     Past Surgical History:   Procedure Laterality Date     CHOLECYSTECTOMY       EXCISE LESION EYELID Right 4/26/2016    Procedure: EXCISE LESION EYELID;  Surgeon: Abelino Pichardo MD;  Location: [facility]     HEAD & NECK SURGERY       ORTHOPEDIC SURGERY      shoulder right      Social History     Social History     Marital status:      Spouse name: N/A     Number of children: N/A     Years of education: N/A     Occupational History     Not on file.     Social History Main Topics     Smoking status: Former Smoker     Smokeless tobacco: Never Used     Alcohol use Yes      Comment: occasionally     Drug use: No     Sexual activity: Not Currently     Other Topics Concern     Not on file     Social History Narrative     Family History   Problem Relation Age of Onset     Other Cancer Sister      Diabetes No family hx of      Coronary Artery Disease No family hx of      Hypertension No family hx of      Hyperlipidemia No family hx of      Cerebrovascular Disease No family hx of      Breast Cancer No family hx of      Colon Cancer No family hx of      Prostate Cancer No family hx of      Depression No family hx of      Anxiety Disorder No family hx of      Substance Abuse No family hx of      Mental Illness No family hx of      Lab Results   Component Value Date    A1C 6.5 03/07/2018       SUBJECTIVE FINDINGS:  An 83-year-old male returns to clinic for toenail fungus and toenail care.  Presents with his wife for .  They relate he has had a stroke in June.  He has been on blood thinners since then.  He relates that he gets scratches on his legs.  He scratches them, and then they put antibiotic ointment and a Band-Aid on them.  Relates no other specific ulcers or sores on his feet.  No specific injuries.  They need some lotion for the feet.      OBJECTIVE FINDINGS:  DP and PT are 1/4 bilaterally.  He has decreased hair growth bilaterally.  He has some peripheral edema bilaterally with venous stasis.  He has a small abrasion on the left anterior leg that appears to be healing with some semi-raised, discolored, thickened skin surrounding it.  Sharp toe is decreased with 5.07 [place] monofilament bilaterally.  He has dry scaly skin in a moccasin pattern bilaterally.  He has dystrophic, thickened, brittle nails with subungual debris, discoloration and dystrophy 1 through 5 bilaterally to differing degrees.  He has decreased ankle joint dorsiflexion, right greater than left.  Deep tendon reflexes are intact bilaterally.  He has generalized weakness and decreased range of motion bilaterally.  He has hyperkeratotic tissue buildup, pinpoint ecchymosis, plantar fourth and fifth MPJs at the interspace.  There is no erythema, no drainage, no odor, no calor bilaterally.      ASSESSMENT AND PLAN:  Onychomycosis bilaterally.  Diabetes with peripheral neuropathy.  Plantar wart, left foot.  Tinea pedis bilaterally.  He is diabetic with peripheral neuropathy and vascular disease.  He has got a skin lesion on his left leg.  I am going to send him over to Dermatology to have this further evaluated and managed.  All the nails 1 through 5 bilaterally were debrided  upon consent.  A Prescription for Loprox cream given and use discussed with him.  The plantar verruca on the left foot was frozen with  liquid nitrogen x3 upon consent and use discussed with him.  He will return to clinic and see me in 2-3 months.  Previous notes reviewed.     Sharp/dull is decreased with 5.07 [place]-Angeline monofilament bilaterally on physical exam.

## 2018-08-31 NOTE — NURSING NOTE
Jaydon Alegria's chief complaint for this visit includes:  Chief Complaint   Patient presents with     RECHECK     toenail trim     PCP: Steffen Bingham    Referring Provider:  Steffen Bingham MD  [address] [facility] [address]  [place] [address]    /74  Pulse 75  SpO2 98%  Data Unavailable     Do you need any medication refills at today's visit? no

## 2018-08-31 NOTE — LETTER
8/31/2018         RE: Jaydon Alegria  [address]  [place] [address]        Dear Colleague,    Thank you for referring your patient, Jaydon Alegria, to the [facility]. Please see a copy of my visit note below.    Past Medical History:   Diagnosis Date     Depressive disorder      Diabetes (H)      Hypertension      Skin cancer 8/25/2016     Patient Active Problem List   Diagnosis     Essential hypertension     Peripheral neuropathy     HL (hearing loss), bilateral     Glaucoma of both eyes     Hyperlipidemia LDL goal <130     Chronic constipation     Late onset Alzheimer's disease without behavioral disturbance     Type 2 diabetes mellitus without complication (H)     Aortic stenosis     ACP (advance care planning)     Benign essential hypertension     Type 2 diabetes mellitus without complication, without long-term current use of insulin (H)     Past Surgical History:   Procedure Laterality Date     CHOLECYSTECTOMY       EXCISE LESION EYELID Right 4/26/2016    Procedure: EXCISE LESION EYELID;  Surgeon: Abelino Pichardo MD;  Location: [facility]     HEAD & NECK SURGERY       ORTHOPEDIC SURGERY      shoulder right      Social History     Social History     Marital status:      Spouse name: N/A     Number of children: N/A     Years of education: N/A     Occupational History     Not on file.     Social History Main Topics     Smoking status: Former Smoker     Smokeless tobacco: Never Used     Alcohol use Yes      Comment: occasionally     Drug use: No     Sexual activity: Not Currently     Other Topics Concern     Not on file     Social History Narrative     Family History   Problem Relation Age of Onset     Other Cancer Sister      Diabetes No family hx of      Coronary Artery Disease No family hx of      Hypertension No family hx of      Hyperlipidemia No family hx of      Cerebrovascular Disease No family hx of       Breast Cancer No family hx of      Colon Cancer No family hx of      Prostate Cancer No family hx of      Depression No family hx of      Anxiety Disorder No family hx of      Substance Abuse No family hx of      Mental Illness No family hx of      Lab Results   Component Value Date    A1C 6.5 03/07/2018      SUBJECTIVE FINDINGS:  An 83-year-old male returns to clinic for toenail fungus and toenail care.  Presents with his wife for .  They relate he has had a stroke in June.  He has been on blood thinners since then.  He relates that he gets scratches on his legs.  He scratches them, and then they put antibiotic ointment and a Band-Aid on them.  Relates no other specific ulcers or sores on his feet.  No specific injuries.  They need some lotion for the feet.      OBJECTIVE FINDINGS:  DP and PT are 1/4 bilaterally.  He has decreased hair growth bilaterally.  He has some peripheral edema bilaterally with venous stasis.  He has a small abrasion on the left anterior leg that appears to be healing with some semi-raised, discolored, thickened skin surrounding it.  Sharp toe is decreased with 5.07 [place]-Angeline monofilament bilaterally.  He has dry scaly skin in a moccasin pattern bilaterally.  He has dystrophic, thickened, brittle nails with subungual debris, discoloration and dystrophy 1 through 5 bilaterally to differing degrees.  He has decreased ankle joint dorsiflexion, right greater than left.  Deep tendon reflexes are intact bilaterally.  He has generalized weakness and decreased range of motion bilaterally.  He has hyperkeratotic tissue buildup, pinpoint ecchymosis, plantar fourth and fifth MPJs at the interspace.  There is no erythema, no drainage, no odor, no calor bilaterally.      ASSESSMENT AND PLAN:  Onychomycosis bilaterally.  Diabetes with peripheral neuropathy.  Plantar wart, left foot.  Tinea pedis bilaterally.  He is diabetic with peripheral neuropathy and vascular disease.  He has got a  skin lesion on his left leg.  I am going to send him over to Dermatology to have this further evaluated and managed.  All the nails 1 through 5 bilaterally were debrided  upon consent.  A Prescription for Loprox cream given and use discussed with him.  The plantar verruca on the left foot was frozen with liquid nitrogen x3 upon consent and use discussed with him.  He will return to clinic and see me in 2-3 months.  Previous notes reviewed.     Sharp/dull is decreased with 5.07 [place]-Angeline monofilament bilaterally on physical exam.         Again, thank you for allowing me to participate in the care of your patient.        Sincerely,        Garfield Fisher DPM

## 2018-08-31 NOTE — MR AVS SNAPSHOT
After Visit Summary   8/31/2018    Jaydon Alegria    MRN: [id number]           Patient Information     Date Of Birth          1935        Visit Information        Provider Department      8/31/2018 1:30 PM Garfield Fisher DPM [facility]        Today's Diagnoses     Dermatophytosis of nail    -  1    Tinea pedis of both feet        Plantar wart        Type II or unspecified type diabetes mellitus with neurological manifestations, not stated as uncontrolled(250.60) (H)        Diabetes mellitus with peripheral vascular disease (H)        Leg skin lesion, left          Care Instructions    Thanks for coming today.  Ortho/Sports Medicine Clinic  [id number] [place], [place] [id number]    To schedule future appointments in Ortho Clinic, you may call [phone].    To schedule ordered imaging by your Provider: Call [facility] at [phone]    [employer] available online at:   [email or web address]    Please call if any further questions or concerns [phone] and ask for the Orthopedic Department. Clinic hours 8 am to 5 pm.    Return to clinic if symptoms worsen.            Follow-ups after your visit        Additional Services     DERMATOLOGY REFERRAL       Please evaluate and manage for left leg skin lesion.                  Your next 10 appointments already scheduled     Sep 12, 2018  2:40 PM CDT   Return Visit with [facility] ([facility])    [facility] [address],  [address] [place] [address]   [phone]            Nov 30, 2018  1:30 PM CST   Return Visit with Garfield Fisher DPM   [facility] ([facility]    [address] [place] [address] [phone]              Who to contact     If you have questions or need follow up information about today's clinic visit or your schedule please contact [facility] directly at  [phone].  Normal or non-critical lab and imaging results will be communicated to you by Orchestratehart, letter or phone within 4 business days after the clinic has received the results. If you do not hear from us within 7 days, please contact the clinic through [employer] or phone. If you have a critical or abnormal lab result, we will notify you by phone as soon as possible.  Submit refill requests through DocLanding or call your pharmacy and they will forward the refill request to us. Please allow 3 business days for your refill to be completed.          Additional Information About Your Visit        Orchestrate[employer]Morning [employer]ec Information     [employer] gives you secure access to your electronic health record. If you see a primary care provider, you can also send messages to your care team and make appointments. If you have questions, please call your primary care clinic.  If you do not have a primary care provider, please call [phone] and they will assist you.      [employer] is an electronic gateway that provides easy, online access to your medical records. With DocLanding, you can request a clinic appointment, read your test results, renew a prescription or communicate with your care team.     To access your existing account, please contact your [facility] or call [phone] for assistance.        Care EveryWhere ID     This is your Care EveryWhere ID. This could be used by other organizations to access your [place] medical records  [id number]        Your Vitals Were     Pulse Pulse Oximetry                75 98%           Blood Pressure from Last 3 Encounters:   08/31/18 160/74   08/02/18 114/69   06/27/18 131/74    Weight from Last 3 Encounters:   08/02/18 66.1 kg (145 lb 12.8 oz)   06/27/18 65 kg (143 lb 6.4 oz)   05/23/18 67.1 kg (148 lb)              We Performed the Following     DERMATOLOGY REFERRAL        Primary Care Provider Office Phone # Fax #    Steffen Bingham [phone]        [address] [facility] [address]  [facility] [address]        Equal Access to Services     JEREMIAH RAMOS : Hadii aad ku hadsarajaylen Dennis, wajo-ann rouse, qadada densonmajosue hemphill, blanca beckcabreraindu cazares. So [facility] [phone].    ATENCIÓN: Si habla español, tiene a elliott disposición servicios gratuitos de asistencia lingüística. Llame al [phone].    We comply with applicable federal civil rights laws and [place] laws. We do not discriminate on the basis of race, color, national origin, age, disability, sex, sexual orientation, or gender identity.            Thank you!     Thank you for choosing [facility]  for your care. Our goal is always to provide you with excellent care. Hearing back from our patients is one way we can continue to improve our services. Please take a few minutes to complete the written survey that you may receive in the mail after your visit with us. Thank you!             Your Updated Medication List - Protect others around you: Learn how to safely use, store and throw away your medicines at www.disposemymeds.org.          This list is accurate as of 8/31/18  2:01 PM.  Always use your most recent med list.                   Brand Name Dispense Instructions for use Diagnosis    amLODIPine 10 MG tablet    NORVASC    90 tablet    Take 1 tablet (10 mg) by mouth daily    Essential hypertension       atorvastatin 80 MG tablet    LIPITOR    90 tablet    Take 1 tablet (80 mg) by mouth daily    Cerebrovascular accident (CVA), unspecified mechanism (H)       B-D ULTRA-FINE 33 LANCETS Misc     100 each    Use 2-4 times daily as directed    Type 2 diabetes mellitus without complication, without long-term current use of insulin (H)       Bacitracin-Polymyx-Balwinder-HC 1 % Oint     1 Tube    Apply to affected areas  daily with dressing changes    Open toe wound, initial encounter       blood glucose monitoring test strip    LEE CONTOUR    100 each    Use to test blood sugars 3  times daily or as directed.    Type 2 diabetes mellitus without complication, without long-term current use of insulin (H)       calcium carbonate 500 mg {elemental} 500 MG tablet    OS-PEDRO PABLO     Take 500 mg by mouth 2 times daily Reported on 4/19/2017        capsaicin 0.025 % Crea cream    ZOSTRIX    60 g    To affected area 2-3 times daily as needed.    Diabetic neuropathy with neurologic complication (H)       * ciclopirox 0.77 % cream    LOPROX    90 g    Apply topically 2 times daily To feet and toenails.    Tinea pedis of both feet       * ciclopirox 0.77 % cream    LOPROX    270 g    Apply topically 2 times daily    Tinea pedis of both feet       clopidogrel 75 MG tablet    PLAVIX    90 tablet    Take 1 tablet (75 mg) by mouth daily    Cerebrovascular accident (CVA), unspecified mechanism (H)       donepezil 10 MG tablet    ARICEPT    90 tablet    Take 1 tablet (10 mg) by mouth At Bedtime    Late onset Alzheimer's disease without behavioral disturbance       DULoxetine 30 MG EC capsule    CYMBALTA    90 capsule    Take 1 capsule (30 mg) by mouth daily    Idiopathic peripheral neuropathy       latanoprost 0.005 % ophthalmic solution    XALATAN     1 drop At Bedtime Reported on 4/19/2017        LORazepam 0.5 MG tablet    ATIVAN    20 tablet    Take 1 tablet (0.5 mg) by mouth every 8 hours as needed for anxiety    Adjustment disorder with anxious mood       losartan 100 MG tablet    COZAAR    90 tablet    Take 1 tablet (100 mg) by mouth daily    Essential hypertension       memantine 10 MG tablet    NAMENDA    180 tablet    Take 1 tablet (10 mg) by mouth 2 times daily    Late onset Alzheimer's disease without behavioral disturbance       metFORMIN 500 MG tablet    GLUCOPHAGE    360 tablet    Take 2 tablets (1,000 mg) by mouth 2 times daily (with meals)    Hyperglycemia       MULTIVITAMIN PO      Reported on 4/19/2017        neomycin-bacitracin-polymyxin 5-400-5000 ointment     30 g    Apply topically as needed  With dressing changes    Skin thinning       salicylic acid 17 % Liqd     1 Bottle    Externally apply 1 dose * topically daily To left foot wart.    Plantar warts       timolol hemihydrate 0.5 % Soln ophthalmic solution    BETIMOL     1 drop        tolnaftate 1 % Aerp    ANTIFUNGAL SPRAY POWDER    130 g    Apply to feet twice daily    Tinea pedis of both feet       VITAMIN D3 PO      Take by mouth daily Reported on 4/19/2017        * Notice:  This list has 2 medication(s) that are the same as other medications prescribed for you. Read the directions carefully, and ask your doctor or other care provider to review them with you.

## 2018-09-12 ENCOUNTER — OFFICE VISIT (OUTPATIENT)
Dept: FAMILY MEDICINE | Facility: CLINIC | Age: 83
End: 2018-09-12
Payer: COMMERCIAL

## 2018-09-12 VITALS
SYSTOLIC BLOOD PRESSURE: 117 MMHG | OXYGEN SATURATION: 98 % | HEART RATE: 66 BPM | BODY MASS INDEX: 22.78 KG/M2 | RESPIRATION RATE: 16 BRPM | DIASTOLIC BLOOD PRESSURE: 67 MMHG | TEMPERATURE: 97.4 F | WEIGHT: 148.6 LBS

## 2018-09-12 DIAGNOSIS — B35.1 DERMATOPHYTOSIS OF NAIL: ICD-10-CM

## 2018-09-12 DIAGNOSIS — G30.1 LATE ONSET ALZHEIMER'S DISEASE WITHOUT BEHAVIORAL DISTURBANCE (H): ICD-10-CM

## 2018-09-12 DIAGNOSIS — F02.80 LATE ONSET ALZHEIMER'S DISEASE WITHOUT BEHAVIORAL DISTURBANCE (H): ICD-10-CM

## 2018-09-12 DIAGNOSIS — I10 BENIGN ESSENTIAL HYPERTENSION: ICD-10-CM

## 2018-09-12 DIAGNOSIS — I67.9 CEREBROVASCULAR DISEASE: ICD-10-CM

## 2018-09-12 DIAGNOSIS — E11.9 TYPE 2 DIABETES MELLITUS WITHOUT COMPLICATION, WITHOUT LONG-TERM CURRENT USE OF INSULIN (H): Primary | ICD-10-CM

## 2018-09-12 LAB
BUN SERPL-MCNC: 16.6 MG/DL (ref 7–21)
CALCIUM SERPL-MCNC: 10 MG/DL (ref 8.5–10.1)
CHLORIDE SERPLBLD-SCNC: 98.9 MMOL/L (ref 98–110)
CO2 SERPL-SCNC: 27.6 MMOL/L (ref 20–32)
CREAT SERPL-MCNC: 0.7 MG/DL (ref 0.7–1.3)
CREAT UR-MCNC: 120 MG/DL
GFR SERPL CREATININE-BSD FRML MDRD: >90 ML/MIN/1.7 M2
GLUCOSE SERPL-MCNC: 151.6 MG'DL (ref 70–99)
HBA1C MFR BLD: 7 % (ref 4.1–5.7)
MICROALBUMIN UR-MCNC: 14 MG/L
MICROALBUMIN/CREAT UR: 11.33 MG/G CR (ref 0–17)
POTASSIUM SERPL-SCNC: 3.8 MMOL/DL (ref 3.3–4.5)
SODIUM SERPL-SCNC: 134.9 MMOL/L (ref 132.6–141.4)

## 2018-09-12 RX ORDER — CICLOPIROX OLAMINE 7.7 MG/G
CREAM TOPICAL 2 TIMES DAILY
Qty: 30 G | Refills: 3 | Status: SHIPPED | OUTPATIENT
Start: 2018-09-12 | End: 2019-01-01

## 2018-09-12 NOTE — MR AVS SNAPSHOT
After Visit Summary   9/12/2018    Jaydon Alegria    MRN: [id number]           Patient Information     Date Of Birth          1935        Visit Information        Provider Department      9/12/2018 2:40 PM Steffen Bingham MD [facility]        Today's Diagnoses     Type 2 diabetes mellitus without complication, without long-term current use of insulin (H)    -  1    Benign essential hypertension        Late onset Alzheimer's disease without behavioral disturbance        Cerebrovascular disease        Dermatophytosis of nail          Care Instructions    Stay on same medicines.    Loprox cream for toenails.              Follow-ups after your visit        Follow-up notes from your care team     Return in about 2 months (around 11/12/2018).      Your next 10 appointments already scheduled     Nov 30, 2018  1:30 PM CST   Return Visit with Garfield Fisher DPM   [facility] ([facility])    [address] [place] [address] [phone]              Who to contact     Please call your clinic at [phone] to:    Ask questions about your health    Make or cancel appointments    Discuss your medicines    Learn about your test results    Speak to your doctor            Additional Information About Your Visit        MyChart Information     [employer] gives you secure access to your electronic health record. If you see a primary care provider, you can also send messages to your care team and make appointments. If you have questions, please call your primary care clinic.  If you do not have a primary care provider, please call [phone] and they will assist you.      [employer] is an electronic gateway that provides easy, online access to your medical records. With [employer], you can request a clinic appointment, read your test results, renew a prescription or communicate with your care team.     To access your existing  account, please contact your [facility] or call [phone] for assistance.        Care EveryWhere ID     This is your Care EveryWhere ID. This could be used by other organizations to access your [place] medical records  [id number]        Your Vitals Were     Pulse Temperature Respirations Pulse Oximetry BMI (Body Mass Index)       66 97.4  F (36.3  C) (Oral) 16 98% 22.78 kg/m2        Blood Pressure from Last 3 Encounters:   09/12/18 117/67   08/31/18 160/74   08/02/18 114/69    Weight from Last 3 Encounters:   09/12/18 148 lb 9.6 oz (67.4 kg)   08/02/18 145 lb 12.8 oz (66.1 kg)   06/27/18 143 lb 6.4 oz (65 kg)              We Performed the Following     Albumin Random Urine Quantitative with Creat Ratio     Basic Metabolic Panel ([facility] [place])     Hemoglobin A1c ([place])          Today's Medication Changes          These changes are accurate as of 9/12/18  3:17 PM.  If you have any questions, ask your nurse or doctor.               These medicines have changed or have updated prescriptions.        Dose/Directions    * ciclopirox 0.77 % cream   Commonly known as:  LOPROX   This may have changed:  Another medication with the same name was added. Make sure you understand how and when to take each.   Used for:  Tinea pedis of both feet   Changed by:  Steffen Bingham MD        Apply topically 2 times daily To feet and toenails.   Quantity:  90 g   Refills:  6       * ciclopirox 0.77 % cream   Commonly known as:  LOPROX   This may have changed:  Another medication with the same name was added. Make sure you understand how and when to take each.   Used for:  Tinea pedis of both feet   Changed by:  Steffen Bingham MD        Apply topically 2 times daily   Quantity:  270 g   Refills:  2       * ciclopirox 0.77 % cream   Commonly known as:  LOPROX   This may have changed:  You were already taking a medication with the same name, and this prescription was added. Make sure you understand how and  when to take each.   Used for:  Dermatophytosis of nail   Changed by:  Steffen Bingham MD        Apply topically 2 times daily   Quantity:  30 g   Refills:  3       * Notice:  This list has 3 medication(s) that are the same as other medications prescribed for you. Read the directions carefully, and ask your doctor or other care provider to review them with you.         Where to get your medicines      These medications were sent to [place] [address] [facility] [address] [address]  [address] [address], [facility] [address]     Phone:  [phone]     ciclopirox 0.77 % cream                Primary Care Provider Office Phone # Fax #    Steffen Bingham [phone]       [address] [place] [address]        Equal Access to Services     JEREMIAH RAMOS AH: Hadii aad ku hadasho Soisacc, waaxda luqadaha, qaybta kaalmada adeegyada, blanca guadarrama . So [facility] [phone].    ATENCIÓN: Si habla español, tiene a elliott disposición servicios gratuitos de asistencia lingüística. Llame al [phone].    We comply with applicable federal civil rights laws and [place] laws. We do not discriminate on the basis of race, color, national origin, age, disability, sex, sexual orientation, or gender identity.            Thank you!     Thank you for choosing [facility]  for your care. Our goal is always to provide you with excellent care. Hearing back from our patients is one way we can continue to improve our services. Please take a few minutes to complete the written survey that you may receive in the mail after your visit with us. Thank you!             Your Updated Medication List - Protect others around you: Learn how to safely use, store and throw away your medicines at www.disposemymeds.org.          This list is accurate as of 9/12/18  3:17 PM.  Always use your most recent med list.                   Brand Name Dispense Instructions for use  Diagnosis    amLODIPine 10 MG tablet    NORVASC    90 tablet    Take 1 tablet (10 mg) by mouth daily    Essential hypertension       atorvastatin 80 MG tablet    LIPITOR    90 tablet    Take 1 tablet (80 mg) by mouth daily    Cerebrovascular accident (CVA), unspecified mechanism (H)       B-D ULTRA-FINE 33 LANCETS Misc     100 each    Use 2-4 times daily as directed    Type 2 diabetes mellitus without complication, without long-term current use of insulin (H)       Bacitracin-Polymyx-Balwinder-HC 1 % Oint     1 Tube    Apply to affected areas  daily with dressing changes    Open toe wound, initial encounter       blood glucose monitoring test strip    LEE CONTOUR    100 each    Use to test blood sugars 3 times daily or as directed.    Type 2 diabetes mellitus without complication, without long-term current use of insulin (H)       calcium carbonate 500 mg {elemental} 500 MG tablet    OS-PEDRO PABLO     Take 500 mg by mouth 2 times daily Reported on 4/19/2017        capsaicin 0.025 % Crea cream    ZOSTRIX    60 g    To affected area 2-3 times daily as needed.    Diabetic neuropathy with neurologic complication (H)       * ciclopirox 0.77 % cream    LOPROX    90 g    Apply topically 2 times daily To feet and toenails.    Tinea pedis of both feet       * ciclopirox 0.77 % cream    LOPROX    270 g    Apply topically 2 times daily    Tinea pedis of both feet       * ciclopirox 0.77 % cream    LOPROX    30 g    Apply topically 2 times daily    Dermatophytosis of nail       clopidogrel 75 MG tablet    PLAVIX    90 tablet    Take 1 tablet (75 mg) by mouth daily    Cerebrovascular accident (CVA), unspecified mechanism (H)       donepezil 10 MG tablet    ARICEPT    90 tablet    Take 1 tablet (10 mg) by mouth At Bedtime    Late onset Alzheimer's disease without behavioral disturbance       DULoxetine 30 MG EC capsule    CYMBALTA    90 capsule    Take 1 capsule (30 mg) by mouth daily    Idiopathic peripheral neuropathy       latanoprost  0.005 % ophthalmic solution    XALATAN     1 drop At Bedtime Reported on 4/19/2017        LORazepam 0.5 MG tablet    ATIVAN    20 tablet    Take 1 tablet (0.5 mg) by mouth every 8 hours as needed for anxiety    Adjustment disorder with anxious mood       losartan 100 MG tablet    COZAAR    90 tablet    Take 1 tablet (100 mg) by mouth daily    Essential hypertension       memantine 10 MG tablet    NAMENDA    180 tablet    Take 1 tablet (10 mg) by mouth 2 times daily    Late onset Alzheimer's disease without behavioral disturbance       metFORMIN 500 MG tablet    GLUCOPHAGE    360 tablet    Take 2 tablets (1,000 mg) by mouth 2 times daily (with meals)    Hyperglycemia       MULTIVITAMIN PO      Reported on 4/19/2017        neomycin-bacitracin-polymyxin 5-400-5000 ointment     30 g    Apply topically as needed With dressing changes    Skin thinning       salicylic acid 17 % Liqd     1 Bottle    Externally apply 1 dose * topically daily To left foot wart.    Plantar warts       timolol hemihydrate 0.5 % Soln ophthalmic solution    BETIMOL     1 drop        tolnaftate 1 % Aerp    ANTIFUNGAL SPRAY POWDER    130 g    Apply to feet twice daily    Tinea pedis of both feet       VITAMIN D3 PO      Take by mouth daily Reported on 4/19/2017        * Notice:  This list has 3 medication(s) that are the same as other medications prescribed for you. Read the directions carefully, and ask your doctor or other care provider to review them with you.

## 2018-09-13 ASSESSMENT — ENCOUNTER SYMPTOMS
GASTROINTESTINAL NEGATIVE: 1
NERVOUS/ANXIOUS: 0
UNEXPECTED WEIGHT CHANGE: 0
CARDIOVASCULAR NEGATIVE: 1
ACTIVITY CHANGE: 0
FATIGUE: 0
CONFUSION: 1
RESPIRATORY NEGATIVE: 1
FEVER: 0
DYSPHORIC MOOD: 0
APPETITE CHANGE: 0
WEAKNESS: 0
LIGHT-HEADEDNESS: 0

## 2018-09-13 NOTE — PROGRESS NOTES
HPI:       83 year old patient who presents with:    F/u multiple conditions.  No new events since last visit.  Wife reports slightly more confused, very gradual.  He has foot pain after swimming but cannot articulate where.      NO problems with medications.  BGs under good control.  He went to a dermatologist yesterday and had 5 biopsies.       Problem, Medication and Allergy Lists were reviewed and are current.  Patient is an established patient of this clinic.         Review of Systems:     Review of Systems   Constitutional: Negative for activity change, appetite change, fatigue, fever and unexpected weight change.   Respiratory: Negative.    Cardiovascular: Negative.    Gastrointestinal: Negative.    Endocrine: Negative for heat intolerance and polyuria.   Neurological: Negative for weakness and light-headedness.   Psychiatric/Behavioral: Positive for confusion. Negative for behavioral problems and dysphoric mood. The patient is not nervous/anxious.           Physical Exam:     /67  Pulse 66  Temp 97.4  F (36.3  C) (Oral)  Resp 16  Wt 148 lb 9.6 oz (67.4 kg)  SpO2 98%  BMI 22.78 kg/m2    Body mass index is 22.78 kg/(m^2).  Vitals reviewed.    Physical Exam   Constitutional: He appears well-developed and well-nourished. No distress.   Eyes: EOM are normal. Pupils are equal, round, and reactive to light.   Neck: No JVD present. No thyromegaly present.   Cardiovascular: Normal rate, regular rhythm and intact distal pulses.  Exam reveals no friction rub.    Murmur (3/6 CLINT aortic) heard.  Pulmonary/Chest: Effort normal and breath sounds normal.   Abdominal: Soft.   Musculoskeletal: Normal range of motion. He exhibits no edema.   Lymphadenopathy:     He has no cervical adenopathy.   Neurological: He is alert. A cranial nerve deficit (trace R facial nerve weakness) is present. He exhibits normal muscle tone.   Skin: He is not diaphoretic.   + toe onychomychosis   Psychiatric: He has a normal mood  and affect.   + memory loss.     Vitals reviewed.          Results:     Results from last visit:  Office Visit on 03/07/2018   Component Date Value Ref Range Status     Hemoglobin A1C 03/07/2018 6.5* 4.1 - 5.7 % Final     Urea Nitrogen 03/07/2018 19.9  7.0 - 21.0 mg/dL Final     Calcium 03/07/2018 10.2* 8.5 - 10.1 mg/dL Final     Chloride 03/07/2018 99.0  98.0 - 110.0 mmol/L Final     Carbon Dioxide 03/07/2018 29.6  20.0 - 32.0 mmol/L Final     Creatinine 03/07/2018 0.8  0.7 - 1.3 mg/dL Final     Glucose 03/07/2018 112.1* 70.0 - 99.0 mg'dL Final     Potassium 03/07/2018 4.5  3.3 - 4.5 mmol/dL Final     Sodium 03/07/2018 137.8  132.6 - 141.4 mmol/L Final     GFR Estimate 03/07/2018 >90  >60.0 mL/min/1.7 m2 Final     GFR Estimate If Black 03/07/2018 >90  >60.0 mL/min/1.7 m2 Final     Creatinine Urine 03/07/2018 190  mg/dL Final     Albumin Urine mg/L 03/07/2018 22  mg/L Final     Albumin Urine mg/g Cr 03/07/2018 11.63  0 - 17 mg/g Cr Final     Assessment and Plan     Jaydon Romero was seen today for toe pain and memory loss.    Diagnoses and all orders for this visit:    Type 2 diabetes mellitus without complication, without long-term current use of insulin (H).  Stable, due for lab work.  -     Basic Metabolic Panel ([place])  -     Hemoglobin A1c ([place])  -     Albumin Random Urine Quantitative with Creat Ratio    Benign essential hypertension. At goal.    Late onset Alzheimer's disease without behavioral disturbance.  Continue memantine and donepezil.    Cerebrovascular disease.  CVD risk factors under good control.    Dermatophytosis of nail  -     ciclopirox (LOPROX) 0.77 % cream; Apply topically 2 times daily        Options for treatment and follow-up care were reviewed with the patient. Jaydon Gonzalesvaldemar Alegria engaged in the decision making process and verbalized understanding of the options discussed and agreed with the final plan.    Steffen Bingham MD

## 2018-10-12 DIAGNOSIS — I63.9 CEREBROVASCULAR ACCIDENT (CVA), UNSPECIFIED MECHANISM (H): ICD-10-CM

## 2018-10-12 NOTE — TELEPHONE ENCOUNTER
Verify that the refill encounter hasn't been started Yes    [facility] phone call message- patient requesting a refill:    Full Medication Name: clopidogrel (PLAVIX) 75 MG tablet      Pharmacy confirmed as   [id number] - [place], [place] - [address] [address]  [address] [address]  Phone: [phone] Fax: [phone]    : Yes    Medication tab checked to see if medication has been sent  Yes    Additional Comments: patient is out     OK to leave a message on voice mail? Yes    Advised patient refill may take up to 2 business days? Yes    Primary language: English      needed? No    Call taken on October 12, 2018 at 4:48 PM by Yoly Zaragoza to  [facility]

## 2018-10-12 NOTE — TELEPHONE ENCOUNTER

## 2018-10-15 RX ORDER — CLOPIDOGREL BISULFATE 75 MG/1
75 TABLET ORAL DAILY
Qty: 90 TABLET | Refills: 3 | Status: SHIPPED | OUTPATIENT
Start: 2018-10-15 | End: 2018-12-21

## 2018-10-16 ENCOUNTER — DOCUMENTATION ONLY (OUTPATIENT)
Dept: OTHER | Facility: CLINIC | Age: 83
End: 2018-10-16

## 2018-11-07 ENCOUNTER — OFFICE VISIT (OUTPATIENT)
Dept: FAMILY MEDICINE | Facility: CLINIC | Age: 83
End: 2018-11-07
Payer: COMMERCIAL

## 2018-11-07 VITALS
HEART RATE: 69 BPM | SYSTOLIC BLOOD PRESSURE: 113 MMHG | OXYGEN SATURATION: 96 % | RESPIRATION RATE: 16 BRPM | BODY MASS INDEX: 22.97 KG/M2 | WEIGHT: 149.8 LBS | TEMPERATURE: 97.4 F | DIASTOLIC BLOOD PRESSURE: 68 MMHG

## 2018-11-07 DIAGNOSIS — F02.80 LATE ONSET ALZHEIMER'S DISEASE WITHOUT BEHAVIORAL DISTURBANCE (H): ICD-10-CM

## 2018-11-07 DIAGNOSIS — G30.1 LATE ONSET ALZHEIMER'S DISEASE WITHOUT BEHAVIORAL DISTURBANCE (H): ICD-10-CM

## 2018-11-07 DIAGNOSIS — E11.9 TYPE 2 DIABETES MELLITUS WITHOUT COMPLICATION, WITHOUT LONG-TERM CURRENT USE OF INSULIN (H): Primary | ICD-10-CM

## 2018-11-07 DIAGNOSIS — I10 BENIGN ESSENTIAL HYPERTENSION: ICD-10-CM

## 2018-11-07 DIAGNOSIS — G60.9 IDIOPATHIC PERIPHERAL NEUROPATHY: ICD-10-CM

## 2018-11-07 ASSESSMENT — ENCOUNTER SYMPTOMS
ACTIVITY CHANGE: 0
SHORTNESS OF BREATH: 0
FEVER: 0
DYSPHORIC MOOD: 0
CHEST TIGHTNESS: 0
CONFUSION: 1
UNEXPECTED WEIGHT CHANGE: 0
NERVOUS/ANXIOUS: 0
APPETITE CHANGE: 0
WEAKNESS: 0

## 2018-11-07 NOTE — PROGRESS NOTES
HPI:       83 year old patient who presents with:    F/u multiple conditions.  He has no complaints.  Wife states dementia worsening gradually.  Independent in ADLs but gets confused easily.  Social situations becoming more daunting for him.      No problems with medications.  No SOB or syncope.  No hypoglycemic episodes.  Still has pain in feet especially when swimming in cold water.           Problem, Medication and Allergy Lists were reviewed and are current.  Patient is an established patient of this clinic.         Review of Systems:     Review of Systems   Constitutional: Negative for activity change, appetite change, fever and unexpected weight change.   Respiratory: Negative for chest tightness and shortness of breath.    Cardiovascular: Negative for chest pain and leg swelling.   Musculoskeletal: Negative for gait problem.   Neurological: Negative for syncope and weakness.        + neuropathic pain in feet   Psychiatric/Behavioral: Positive for confusion. Negative for behavioral problems and dysphoric mood. The patient is not nervous/anxious.           Physical Exam:     /68  Pulse 69  Temp 97.4  F (36.3  C) (Oral)  Resp 16  Wt 149 lb 12.8 oz (67.9 kg)  SpO2 96%  BMI 22.97 kg/m2    Body mass index is 22.97 kg/(m^2).  Vitals reviewed.    Physical Exam   Constitutional: He appears well-developed and well-nourished. No distress.   Eyes: EOM are normal. Pupils are equal, round, and reactive to light.   Neck: No JVD present. No thyromegaly present.   Cardiovascular: Normal rate, regular rhythm and intact distal pulses.  Exam reveals no friction rub.    Murmur (3/6 CLINT aortic) heard.  Pulmonary/Chest: Effort normal and breath sounds normal.   Abdominal: Soft.   Musculoskeletal: Normal range of motion. He exhibits no edema.   Lymphadenopathy:     He has no cervical adenopathy.   Neurological: He is alert. He exhibits normal muscle tone.   Skin: He is not diaphoretic.   Psychiatric: He has a  normal mood and affect.   + memory loss.     Vitals reviewed.          Results:     Results from last visit:  Office Visit on 09/12/2018   Component Date Value Ref Range Status     Urea Nitrogen 09/12/2018 16.6  7.0 - 21.0 mg/dL Final     Calcium 09/12/2018 10.0  8.5 - 10.1 mg/dL Final     Chloride 09/12/2018 98.9  98.0 - 110.0 mmol/L Final     Carbon Dioxide 09/12/2018 27.6  20.0 - 32.0 mmol/L Final     Creatinine 09/12/2018 0.7  0.7 - 1.3 mg/dL Final     Glucose 09/12/2018 151.6* 70.0 - 99.0 mg'dL Final     Potassium 09/12/2018 3.8  3.3 - 4.5 mmol/dL Final     Sodium 09/12/2018 134.9  132.6 - 141.4 mmol/L Final     GFR Estimate 09/12/2018 >90  >60.0 mL/min/1.7 m2 Final     GFR Estimate If Black 09/12/2018 >90  >60.0 mL/min/1.7 m2 Final     Hemoglobin A1C 09/12/2018 7.0* 4.1 - 5.7 % Final     Creatinine Urine 09/12/2018 120  mg/dL Final     Albumin Urine mg/L 09/12/2018 14  mg/L Final     Albumin Urine mg/g Cr 09/12/2018 11.33  0 - 17 mg/g Cr Final     Assessment and Plan     Jaydon Romero was seen today for recheck and refill request.    Diagnoses and all orders for this visit:    Type 2 diabetes mellitus without complication, without long-term current use of insulin (H).  A1c at goal, continue current Rx.      Benign essential hypertension.  At goal continue Rx.    Late onset Alzheimer's disease without behavioral disturbance.  He is gradually worsening as would be expected.  All functional needs met and wife is holding up as primary caregiver.    Idiopathic peripheral neuropathy.  Likely related to DM.  Discussed possible increase of duloxetine to 60 mg/d and wife declined out of concerns of polypharmacy.  I think this is reasonable.    She also had a question about Shingrix and I recommended it.        Options for treatment and follow-up care were reviewed with the patient. Jaydon Alegria engaged in the decision making process and verbalized understanding of the options discussed and  agreed with the final plan.    Steffen Bingham MD

## 2018-11-07 NOTE — MR AVS SNAPSHOT
After Visit Summary   11/7/2018    Jaydon Alegria    MRN: [id number]           Patient Information     Date Of Birth          1935        Visit Information        Provider Department      11/7/2018 1:00 PM Steffen Bingham MD [facility]        Today's Diagnoses     Type 2 diabetes mellitus without complication, without long-term current use of insulin (H)    -  1    Benign essential hypertension        Late onset Alzheimer's disease without behavioral disturbance        Idiopathic peripheral neuropathy          Care Instructions    Continue same medications.    Ask pharmacy for Shingrix vaccination -- may get it.          Follow-ups after your visit        Follow-up notes from your care team     Return in about 3 months (around 2/7/2019).      Your next 10 appointments already scheduled     Dec 05, 2018  1:30 PM CST   Return Visit with Garfield Fisher DPM   [facility]    [address] [place] [id number] [phone]              Who to contact     Please call your clinic at [phone] to:    Ask questions about your health    Make or cancel appointments    Discuss your medicines    Learn about your test results    Speak to your doctor            Additional Information About Your Visit        Cert[employer]ar[employer]avo Information     Addepar gives you secure access to your electronic health record. If you see a primary care provider, you can also send messages to your care team and make appointments. If you have questions, please call your primary care clinic.  If you do not have a primary care provider, please call [phone] and they will assist you.      Addepar is an electronic gateway that provides easy, online access to your medical records. With Add[employer]ar, you can request a clinic appointment, read your test results, renew a prescription or communicate with your care team.     To access your existing  account, please contact your [facility] or call [phone] for assistance.        Care EveryWhere ID     This is your Care EveryWhere ID. This could be used by other organizations to access your [place] medical records  [id number]        Your Vitals Were     Pulse Temperature Respirations Pulse Oximetry BMI (Body Mass Index)       69 97.4  F (36.3  C) (Oral) 16 96% 22.97 kg/m2        Blood Pressure from Last 3 Encounters:   11/07/18 113/68   09/12/18 117/67   08/31/18 160/74    Weight from Last 3 Encounters:   11/07/18 149 lb 12.8 oz (67.9 kg)   09/12/18 148 lb 9.6 oz (67.4 kg)   08/02/18 145 lb 12.8 oz (66.1 kg)              Today, you had the following     No orders found for display       Primary Care Provider Office Phone # Fax #    Steffen Bingham [phone]       [address] [place] [address]        Equal Access to Services     JEREMIAH RAMOS : Hadii aad ku hadasho Soomaali, waaxda luqadaha, qaybta kaalmada adeegyada, waxjenny gerberin haydelmy guadarrama . So [facility] [phone].    ATENCIÓN: Si habla español, tiene a elliott disposición servicios gratuitos de asistencia lingüística. Llame al [phone].    We comply with applicable federal civil rights laws and [place] laws. We do not discriminate on the basis of race, color, national origin, age, disability, sex, sexual orientation, or gender identity.            Thank you!     Thank you for choosing [facility]  for your care. Our goal is always to provide you with excellent care. Hearing back from our patients is one way we can continue to improve our services. Please take a few minutes to complete the written survey that you may receive in the mail after your visit with us. Thank you!             Your Updated Medication List - Protect others around you: Learn how to safely use, store and throw away your medicines at www.disposemymeds.org.          This list is accurate  as of 11/7/18  1:30 PM.  Always use your most recent med list.                   Brand Name Dispense Instructions for use Diagnosis    amLODIPine 10 MG tablet    NORVASC    90 tablet    Take 1 tablet (10 mg) by mouth daily    Essential hypertension       atorvastatin 80 MG tablet    LIPITOR    90 tablet    Take 1 tablet (80 mg) by mouth daily    Cerebrovascular accident (CVA), unspecified mechanism (H)       B-D ULTRA-FINE 33 LANCETS Misc     100 each    Use 2-4 times daily as directed    Type 2 diabetes mellitus without complication, without long-term current use of insulin (H)       Bacitracin-Polymyx-Balwinder-HC 1 % Oint     1 Tube    Apply to affected areas  daily with dressing changes    Open toe wound, initial encounter       blood glucose monitoring test strip    LEE CONTOUR    100 each    Use to test blood sugars 3 times daily or as directed.    Type 2 diabetes mellitus without complication, without long-term current use of insulin (H)       calcium carbonate 500 mg (elemental) 500 MG tablet    OS-PEDRO PABLO     Take 500 mg by mouth 2 times daily Reported on 4/19/2017        capsaicin 0.025 % Crea cream    ZOSTRIX    60 g    To affected area 2-3 times daily as needed.    Diabetic neuropathy with neurologic complication (H)       * ciclopirox 0.77 % cream    LOPROX    90 g    Apply topically 2 times daily To feet and toenails.    Tinea pedis of both feet       * ciclopirox 0.77 % cream    LOPROX    270 g    Apply topically 2 times daily    Tinea pedis of both feet       * ciclopirox 0.77 % cream    LOPROX    30 g    Apply topically 2 times daily    Dermatophytosis of nail       clopidogrel 75 MG tablet    PLAVIX    90 tablet    Take 1 tablet (75 mg) by mouth daily    Cerebrovascular accident (CVA), unspecified mechanism (H)       donepezil 10 MG tablet    ARICEPT    90 tablet    Take 1 tablet (10 mg) by mouth At Bedtime    Late onset Alzheimer's disease without behavioral disturbance       DULoxetine 30 MG EC capsule     CYMBALTA    90 capsule    Take 1 capsule (30 mg) by mouth daily    Idiopathic peripheral neuropathy       latanoprost 0.005 % ophthalmic solution    XALATAN     1 drop At Bedtime Reported on 4/19/2017        LORazepam 0.5 MG tablet    ATIVAN    20 tablet    Take 1 tablet (0.5 mg) by mouth every 8 hours as needed for anxiety    Adjustment disorder with anxious mood       losartan 100 MG tablet    COZAAR    90 tablet    Take 1 tablet (100 mg) by mouth daily    Essential hypertension       memantine 10 MG tablet    NAMENDA    180 tablet    Take 1 tablet (10 mg) by mouth 2 times daily    Late onset Alzheimer's disease without behavioral disturbance       metFORMIN 500 MG tablet    GLUCOPHAGE    360 tablet    Take 2 tablets (1,000 mg) by mouth 2 times daily (with meals)    Hyperglycemia       MULTIVITAMIN PO      Reported on 4/19/2017        neomycin-bacitracin-polymyxin 5-400-5000 ointment     30 g    Apply topically as needed With dressing changes    Skin thinning       salicylic acid 17 % Liqd     1 Bottle    Externally apply 1 dose * topically daily To left foot wart.    Plantar warts       timolol hemihydrate 0.5 % Soln ophthalmic solution    BETIMOL     1 drop        tolnaftate 1 % Aerp    ANTIFUNGAL SPRAY POWDER    130 g    Apply to feet twice daily    Tinea pedis of both feet       VITAMIN D3 PO      Take by mouth daily Reported on 4/19/2017        * Notice:  This list has 3 medication(s) that are the same as other medications prescribed for you. Read the directions carefully, and ask your doctor or other care provider to review them with you.

## 2018-11-26 DIAGNOSIS — R73.9 HYPERGLYCEMIA: ICD-10-CM

## 2018-11-26 NOTE — TELEPHONE ENCOUNTER

## 2018-12-05 ENCOUNTER — OFFICE VISIT (OUTPATIENT)
Dept: PODIATRY | Facility: CLINIC | Age: 83
End: 2018-12-05
Payer: COMMERCIAL

## 2018-12-05 VITALS — OXYGEN SATURATION: 98 % | HEART RATE: 74 BPM | SYSTOLIC BLOOD PRESSURE: 124 MMHG | DIASTOLIC BLOOD PRESSURE: 64 MMHG

## 2018-12-05 DIAGNOSIS — B35.1 ONYCHOMYCOSIS: Primary | ICD-10-CM

## 2018-12-05 DIAGNOSIS — E11.51 DIABETES MELLITUS WITH PERIPHERAL VASCULAR DISEASE (H): ICD-10-CM

## 2018-12-05 DIAGNOSIS — E11.49 TYPE II OR UNSPECIFIED TYPE DIABETES MELLITUS WITH NEUROLOGICAL MANIFESTATIONS, NOT STATED AS UNCONTROLLED(250.60) (H): ICD-10-CM

## 2018-12-05 PROCEDURE — 99213 OFFICE O/P EST LOW 20 MIN: CPT | Performed by: PODIATRIST

## 2018-12-05 NOTE — LETTER
12/5/2018         RE: Jaydon Alegria  [address]  [facility] [address]        Dear Colleague,    Thank you for referring your patient, Jaydon Alegria, to the [facility]. Please see a copy of my visit note below.    Past Medical History:   Diagnosis Date     Depressive disorder      Diabetes (H)      Hypertension      Skin cancer 8/25/2016     Patient Active Problem List   Diagnosis     Essential hypertension     Peripheral neuropathy     HL (hearing loss), bilateral     Glaucoma of both eyes     Hyperlipidemia LDL goal <130     Chronic constipation     Late onset Alzheimer's disease without behavioral disturbance     Type 2 diabetes mellitus without complication (H)     Aortic stenosis     Benign essential hypertension     Type 2 diabetes mellitus without complication, without long-term current use of insulin (H)     Cerebrovascular disease     Past Surgical History:   Procedure Laterality Date     CHOLECYSTECTOMY       EXCISE LESION EYELID Right 4/26/2016    Procedure: EXCISE LESION EYELID;  Surgeon: Abelino Pichardo MD;  Location: [facility]     HEAD & NECK SURGERY       ORTHOPEDIC SURGERY      shoulder right      Social History     Social History     Marital status:      Spouse name: N/A     Number of children: N/A     Years of education: N/A     Occupational History     Not on file.     Social History Main Topics     Smoking status: Former Smoker     Smokeless tobacco: Never Used     Alcohol use Yes      Comment: occasionally     Drug use: No     Sexual activity: Not Currently     Other Topics Concern     Not on file     Social History Narrative     Family History   Problem Relation Age of Onset     Other Cancer Sister      Diabetes No family hx of      Coronary Artery Disease No family hx of      Hypertension No family hx of      Hyperlipidemia No family hx of      Cerebrovascular Disease No family hx of      Breast  Cancer No family hx of      Colon Cancer No family hx of      Prostate Cancer No family hx of      Depression No family hx of      Anxiety Disorder No family hx of      Substance Abuse No family hx of      Mental Illness No family hx of      Lab Results   Component Value Date    A1C 7.0 09/12/2018      SUBJECTIVE FINDINGS:  This 83-year-old male returns to clinic for onychomycosis bilaterally.  He is diabetic with peripheral neuropathy, plantar wart left foot, tinea pedis.  Relates they are using the Loprox cream and he did see Dermatology and he had some lesions removed.  No ulcers or sores since we have seen him last.  He does have numbness, tingling, peripheral neuropathy he relates to on his feet.       OBJECTIVE FINDINGS:  DP and PT are 1/4 bilaterally.  He has decreased hair growth bilaterally.  He has some peripheral edema with venous stasis bilaterally.  There is no erythema, no drainage, no odor, no calor bilaterally.  He has dystrophic, thickened, brittle nails with subungual debris 1-5 bilaterally to differing degrees.  He has dry scaly skin in a moccasin pattern on the right foot, mild.  Minimal hyperkeratotic tissue buildup plantar fourth and fifth MPJs, left fourth interspace.       ASSESSMENT AND PLAN:  Onychomycosis bilaterally, diabetes with peripheral neuropathy, tinea pedis that is improved, plantar warts appear to be nearly resolved.  Diagnosis and treatment discussed with him.  He is diabetic with peripheral neuropathy and vascular disease and onychomycosis.  All the nails 1-5 bilaterally were debrided upon consent.  Continue the Loprox cream.  The right hallux bled a bit upon debridement.  Local wound care with Band-Aid and Betadine done upon consent and Betadine dispensed, use discussed with him.  Return to clinic to see me in 2-3 months.         Again, thank you for allowing me to participate in the care of your patient.        Sincerely,        Garfield Fisher DPM

## 2018-12-05 NOTE — NURSING NOTE
Jaydon Alegria's chief complaint for this visit includes:  Chief Complaint   Patient presents with     RECHECK     toenails     PCP: Steffen Bingham    Referring Provider:  Steffen Bingham MD  [address] [facility] [facility] [address]  [place] [address]    /64  Pulse 74  SpO2 98%  Data Unavailable     Do you need any medication refills at today's visit? no

## 2018-12-05 NOTE — PATIENT INSTRUCTIONS
Thanks for coming today.  [facility]  [address] [place] [address]    To schedule future appointments in Ortho Clinic, you may call [phone].    To schedule ordered imaging by your Provider: Call [facility] at [phone]    DripDrop available online at:   [email or web address]    Please call if any further questions or concerns [phone] and ask for the Orthopedic Department. Clinic hours 8 am to 5 pm.    Return to clinic if symptoms worsen.

## 2018-12-05 NOTE — MR AVS SNAPSHOT
After Visit Summary   12/5/2018    Jaydon Alegria    MRN: [id number]           Patient Information     Date Of Birth          1935        Visit Information        Provider Department      12/5/2018 1:30 PM Garfield Fisher DPM [facility] [place]        Today's Diagnoses     Onychomycosis    -  1    Type II or unspecified type diabetes mellitus with neurological manifestations, not stated as uncontrolled(250.60) (H)        Diabetes mellitus with peripheral vascular disease (H)          Care Instructions    Thanks for coming today.  Ortho/Sports Medicine Clinic  [address] [place] [id number]    To schedule future appointments in Ortho Clinic, you may call [phone].    To schedule ordered imaging by your Provider: Call [facility] at [phone]    [employer] available online at:   [email or web address]    Please call if any further questions or concerns [phone] and ask for the Orthopedic Department. Clinic hours 8 am to 5 pm.    Return to clinic if symptoms worsen.            Follow-ups after your visit        Follow-up notes from your care team     Return in about 6 months (around 6/5/2019).      Your next 10 appointments already scheduled     Feb 19, 2019  1:30 PM CST   Return Visit with Garfield Fisher DPM   [facility] [place] ([facility] [place])    [address] [place] [id number] [phone]              Who to contact     If you have questions or need follow up information about today's clinic visit or your schedule please contact [facility] directly at [phone].  Normal or non-critical lab and imaging results will be communicated to you by MyChart, letter or phone within 4 business days after the clinic has received the results. If you do not hear from us within 7 days, please contact the clinic through MyChart or phone. If you have a critical or abnormal lab result, we  will notify you by phone as soon as possible.  Submit refill requests through [employer] or call your pharmacy and they will forward the refill request to us. Please allow 3 business days for your refill to be completed.          Additional Information About Your Visit        VI SystemsharMyRoll Information     AccessData gives you secure access to your electronic health record. If you see a primary care provider, you can also send messages to your care team and make appointments. If you have questions, please call your primary care clinic.  If you do not have a primary care provider, please call [phone] and they will assist you.      [employer] is an electronic gateway that provides easy, online access to your medical records. With Access[employer], you can request a clinic appointment, read your test results, renew a prescription or communicate with your care team.     To access your existing account, please contact your [facility] or call [phone] for assistance.        Care EveryWhere ID     This is your Care EveryWhere ID. This could be used by other organizations to access your [place] medical records  [id number]        Your Vitals Were     Pulse Pulse Oximetry                74 98%           Blood Pressure from Last 3 Encounters:   12/05/18 124/64   11/07/18 113/68   09/12/18 117/67    Weight from Last 3 Encounters:   11/07/18 149 lb 12.8 oz (67.9 kg)   09/12/18 148 lb 9.6 oz (67.4 kg)   08/02/18 145 lb 12.8 oz (66.1 kg)              We Performed the Following     DEBRIDEMENT OF NAILS, 6 OR MORE        Primary Care Provider Office Phone # Fax #    Steffen Bingham [phone]       [address] [place] [address]        Equal Access to Services     [facility] : Hadjennie Dennis, preet rouse, blanca dupont. So [facility] [phone].    ATENCIÓN: Si habla español, tiene a elliott disposición servicios  eddie de asistencia lingüística. Bobby marie [phone].    We comply with applicable federal civil rights laws and [place] laws. We do not discriminate on the basis of race, color, national origin, age, disability, sex, sexual orientation, or gender identity.            Thank you!     Thank you for choosing [facility]  for your care. Our goal is always to provide you with excellent care. Hearing back from our patients is one way we can continue to improve our services. Please take a few minutes to complete the written survey that you may receive in the mail after your visit with us. Thank you!             Your Updated Medication List - Protect others around you: Learn how to safely use, store and throw away your medicines at www.disposemymeds.org.          This list is accurate as of 12/5/18  1:56 PM.  Always use your most recent med list.                   Brand Name Dispense Instructions for use Diagnosis    amLODIPine 10 MG tablet    NORVASC    90 tablet    Take 1 tablet (10 mg) by mouth daily    Essential hypertension       atorvastatin 80 MG tablet    LIPITOR    90 tablet    Take 1 tablet (80 mg) by mouth daily    Cerebrovascular accident (CVA), unspecified mechanism (H)       B-D ULTRA-FINE 33 LANCETS Misc     100 each    Use 2-4 times daily as directed    Type 2 diabetes mellitus without complication, without long-term current use of insulin (H)       Bacitracin-Polymyx-Balwinder-HC 1 % Oint     1 Tube    Apply to affected areas  daily with dressing changes    Open toe wound, initial encounter       blood glucose monitoring test strip    LEE CONTOUR    100 each    Use to test blood sugars 3 times daily or as directed.    Type 2 diabetes mellitus without complication, without long-term current use of insulin (H)       calcium carbonate 500 MG tablet    OS-PEDRO PABLO     Take 500 mg by mouth 2 times daily Reported on 4/19/2017        capsaicin 0.025 % external cream    ZOSTRIX    60 g    To  affected area 2-3 times daily as needed.    Diabetic neuropathy with neurologic complication (H)       * ciclopirox 0.77 % cream    LOPROX    90 g    Apply topically 2 times daily To feet and toenails.    Tinea pedis of both feet       * ciclopirox 0.77 % cream    LOPROX    270 g    Apply topically 2 times daily    Tinea pedis of both feet       * ciclopirox 0.77 % cream    LOPROX    30 g    Apply topically 2 times daily    Dermatophytosis of nail       clopidogrel 75 MG tablet    PLAVIX    90 tablet    Take 1 tablet (75 mg) by mouth daily    Cerebrovascular accident (CVA), unspecified mechanism (H)       donepezil 10 MG tablet    ARICEPT    90 tablet    Take 1 tablet (10 mg) by mouth At Bedtime    Late onset Alzheimer's disease without behavioral disturbance       DULoxetine 30 MG capsule    CYMBALTA    90 capsule    Take 1 capsule (30 mg) by mouth daily    Idiopathic peripheral neuropathy       latanoprost 0.005 % ophthalmic solution    XALATAN     1 drop At Bedtime Reported on 4/19/2017        LORazepam 0.5 MG tablet    ATIVAN    20 tablet    Take 1 tablet (0.5 mg) by mouth every 8 hours as needed for anxiety    Adjustment disorder with anxious mood       losartan 100 MG tablet    COZAAR    90 tablet    Take 1 tablet (100 mg) by mouth daily    Essential hypertension       memantine 10 MG tablet    NAMENDA    180 tablet    Take 1 tablet (10 mg) by mouth 2 times daily    Late onset Alzheimer's disease without behavioral disturbance       metFORMIN 500 MG tablet    GLUCOPHAGE    360 tablet    Take 2 tablets (1,000 mg) by mouth 2 times daily (with meals)    Hyperglycemia       MULTIVITAMIN PO      Reported on 4/19/2017        neomycin-bacitracin-polymyxin 5-400-5000 ointment     30 g    Apply topically as needed With dressing changes    Skin thinning       salicylic acid 17 % Liqd     1 Bottle    Externally apply 1 dose * topically daily To left foot wart.    Plantar warts       timolol hemihydrate 0.5 % ophthalmic  solution    BETIMOL     1 drop        tolnaftate 1 % external spray    ANTIFUNGAL SPRAY POWDER    130 g    Apply to feet twice daily    Tinea pedis of both feet       VITAMIN D3 PO      Take by mouth daily Reported on 4/19/2017        * Notice:  This list has 3 medication(s) that are the same as other medications prescribed for you. Read the directions carefully, and ask your doctor or other care provider to review them with you.

## 2018-12-05 NOTE — PROGRESS NOTES
Past Medical History:   Diagnosis Date     Depressive disorder      Diabetes (H)      Hypertension      Skin cancer 8/25/2016     Patient Active Problem List   Diagnosis     Essential hypertension     Peripheral neuropathy     HL (hearing loss), bilateral     Glaucoma of both eyes     Hyperlipidemia LDL goal <130     Chronic constipation     Late onset Alzheimer's disease without behavioral disturbance     Type 2 diabetes mellitus without complication (H)     Aortic stenosis     Benign essential hypertension     Type 2 diabetes mellitus without complication, without long-term current use of insulin (H)     Cerebrovascular disease     Past Surgical History:   Procedure Laterality Date     CHOLECYSTECTOMY       EXCISE LESION EYELID Right 4/26/2016    Procedure: EXCISE LESION EYELID;  Surgeon: Abelino Pichardo MD;  Location: [facility]     HEAD & NECK SURGERY       ORTHOPEDIC SURGERY      shoulder right      Social History     Social History     Marital status:      Spouse name: N/A     Number of children: N/A     Years of education: N/A     Occupational History     Not on file.     Social History Main Topics     Smoking status: Former Smoker     Smokeless tobacco: Never Used     Alcohol use Yes      Comment: occasionally     Drug use: No     Sexual activity: Not Currently     Other Topics Concern     Not on file     Social History Narrative     Family History   Problem Relation Age of Onset     Other Cancer Sister      Diabetes No family hx of      Coronary Artery Disease No family hx of      Hypertension No family hx of      Hyperlipidemia No family hx of      Cerebrovascular Disease No family hx of      Breast Cancer No family hx of      Colon Cancer No family hx of      Prostate Cancer No family hx of      Depression No family hx of      Anxiety Disorder No family hx of      Substance Abuse No family hx of      Mental Illness No family hx of      Lab Results   Component Value Date    A1C 7.0 09/12/2018       SUBJECTIVE FINDINGS:  This 83-year-old male returns to clinic for onychomycosis bilaterally.  He is diabetic with peripheral neuropathy, plantar wart left foot, tinea pedis.  Relates they are using the Loprox cream and he did see Dermatology and he had some lesions removed.  No ulcers or sores since we have seen him last.  He does have numbness, tingling, peripheral neuropathy he relates to on his feet.       OBJECTIVE FINDINGS:  DP and PT are 1/4 bilaterally.  He has decreased hair growth bilaterally.  He has some peripheral edema with venous stasis bilaterally.  There is no erythema, no drainage, no odor, no calor bilaterally.  He has dystrophic, thickened, brittle nails with subungual debris 1-5 bilaterally to differing degrees.  He has dry scaly skin in a moccasin pattern on the right foot, mild.  Minimal hyperkeratotic tissue buildup plantar fourth and fifth MPJs, left fourth interspace.       ASSESSMENT AND PLAN:  Onychomycosis bilaterally, diabetes with peripheral neuropathy, tinea pedis that is improved, plantar warts appear to be nearly resolved.  Diagnosis and treatment discussed with him.  He is diabetic with peripheral neuropathy and vascular disease and onychomycosis.  All the nails 1-5 bilaterally were debrided upon consent.  Continue the Loprox cream.  The right hallux bled a bit upon debridement.  Local wound care with Band-Aid and Betadine done upon consent and Betadine dispensed, use discussed with him.  Return to clinic to see me in 2-3 months.

## 2018-12-13 DIAGNOSIS — R73.9 HYPERGLYCEMIA: ICD-10-CM

## 2018-12-13 NOTE — TELEPHONE ENCOUNTER

## 2018-12-18 DIAGNOSIS — I63.9 CEREBROVASCULAR ACCIDENT (CVA), UNSPECIFIED MECHANISM (H): ICD-10-CM

## 2018-12-18 RX ORDER — ATORVASTATIN CALCIUM 80 MG/1
80 TABLET, FILM COATED ORAL DAILY
Qty: 90 TABLET | Refills: 3 | Status: SHIPPED | OUTPATIENT
Start: 2018-12-18 | End: 2019-03-20

## 2018-12-18 NOTE — TELEPHONE ENCOUNTER

## 2018-12-20 DIAGNOSIS — I63.9 CEREBROVASCULAR ACCIDENT (CVA), UNSPECIFIED MECHANISM (H): ICD-10-CM

## 2018-12-20 NOTE — TELEPHONE ENCOUNTER

## 2018-12-21 RX ORDER — CLOPIDOGREL BISULFATE 75 MG/1
75 TABLET ORAL DAILY
Qty: 90 TABLET | Refills: 3 | Status: SHIPPED | OUTPATIENT
Start: 2018-12-21 | End: 2019-05-29

## 2019-01-01 ENCOUNTER — DOCUMENTATION ONLY (OUTPATIENT)
Dept: FAMILY MEDICINE | Facility: CLINIC | Age: 84
End: 2019-01-01

## 2019-01-01 ENCOUNTER — TELEPHONE (OUTPATIENT)
Dept: FAMILY MEDICINE | Facility: CLINIC | Age: 84
End: 2019-01-01

## 2019-01-01 ENCOUNTER — HEALTH MAINTENANCE LETTER (OUTPATIENT)
Age: 84
End: 2019-01-01

## 2019-01-01 ENCOUNTER — DOCUMENTATION ONLY (OUTPATIENT)
Dept: OTHER | Facility: CLINIC | Age: 84
End: 2019-01-01

## 2019-01-01 ENCOUNTER — TELEPHONE (OUTPATIENT)
Dept: FAMILY MEDICINE | Facility: CLINIC | Age: 84
End: 2019-01-01
Payer: MEDICARE

## 2019-01-01 ENCOUNTER — MEDICAL CORRESPONDENCE (OUTPATIENT)
Dept: HEALTH INFORMATION MANAGEMENT | Facility: CLINIC | Age: 84
End: 2019-01-01

## 2019-01-01 DIAGNOSIS — F02.80 LATE ONSET ALZHEIMER'S DISEASE WITHOUT BEHAVIORAL DISTURBANCE (H): Primary | ICD-10-CM

## 2019-01-01 DIAGNOSIS — G30.1 LATE ONSET ALZHEIMER'S DISEASE WITHOUT BEHAVIORAL DISTURBANCE (H): Primary | ICD-10-CM

## 2019-01-01 DIAGNOSIS — B35.1 DERMATOPHYTOSIS OF NAIL: ICD-10-CM

## 2019-01-01 DIAGNOSIS — R29.6 RECURRENT FALLS: ICD-10-CM

## 2019-01-01 RX ORDER — CICLOPIROX OLAMINE 7.7 MG/G
CREAM TOPICAL 2 TIMES DAILY
Qty: 30 G | Refills: 3 | Status: SHIPPED | OUTPATIENT
Start: 2019-01-01 | End: 2020-01-01

## 2019-01-07 ENCOUNTER — OFFICE VISIT (OUTPATIENT)
Dept: FAMILY MEDICINE | Facility: CLINIC | Age: 84
End: 2019-01-07
Payer: MEDICARE

## 2019-01-07 VITALS
BODY MASS INDEX: 22.08 KG/M2 | WEIGHT: 144 LBS | OXYGEN SATURATION: 98 % | HEART RATE: 71 BPM | SYSTOLIC BLOOD PRESSURE: 110 MMHG | DIASTOLIC BLOOD PRESSURE: 68 MMHG

## 2019-01-07 DIAGNOSIS — S51.012A LACERATION OF LEFT ELBOW, INITIAL ENCOUNTER: ICD-10-CM

## 2019-01-07 DIAGNOSIS — S81.011A LACERATION OF RIGHT KNEE, INITIAL ENCOUNTER: ICD-10-CM
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
## 2019-01-07 ASSESSMENT — ENCOUNTER SYMPTOMS
FEVER: 0
DIZZINESS: 0
CHILLS: 0
WOUND: 1
VOMITING: 0
NAUSEA: 0
CONFUSION: 1
SHORTNESS OF BREATH: 0

## 2019-01-07 NOTE — PROGRESS NOTES
"       HPI       Jaydon Alegria is a 83 year old who presents for fall follow up on 1/3/19.   Chief Complaint   Patient presents with     RECHECK     fall on 1/4/19   History of Type II DM, dementia (Alzheimer's), peripheral neuropathy.     Follow up from recent  visit 1/3/19 for fall and left elbow laceration. He tripped on the sidewalk and fell, mechanical fall, and hit knees and left arm. No LOC. No Head injury. Patient takes Plavix. Tetanus given on 01/03/2019. Steri strips were applied to left knee, left elbow on ED. Per wife, he has fallen before- a couple of months ago- and felt okay but was also due to \"not watching where he was going\" and tripped on the sidewalk. She blames herself, it appears, because she says she \"has to hold onto him constantly\" and \"was busy talking to cousin but should have been holding him\". Patient has recovered well. His weekend was \"very good\". Initially at the ED, there was a lot of bleeding and skin removed per wife. Wife thinks it is much much better. He denies having much pain at all. Yesterday was the last time Tylenol was given. Knee is much better- putting only a band-aid on it now. Denies fever, chills, nausea, vomiting, chest pain, shortness of breath. Denies issues moving left elbow or knees.     Requesting more gauze and tape today.     Problem, Medication and Allergy Lists were reviewed and updated if needed..    Patient is an established patient of this clinic.  Past Medical History:   Diagnosis Date     Depressive disorder      Diabetes (H)      Hypertension      Skin cancer 8/25/2016   Dementia         Review of Systems:   Review of Systems   Constitutional: Negative for chills and fever.   Respiratory: Negative for shortness of breath.    Cardiovascular: Negative for chest pain.   Gastrointestinal: Negative for nausea and vomiting.   Skin: Positive for wound (in HPI).   Neurological: Negative for dizziness.   Psychiatric/Behavioral: Positive " for confusion (dementia present).          Physical Exam:     Vitals:    01/07/19 1438   BP: 110/68   Pulse: 71   SpO2: 98%   Weight: 65.3 kg (144 lb)     Body mass index is 22.08 kg/m .  Vitals were reviewed and were normal     Physical Exam   Constitutional: He appears well-developed and well-nourished. No distress.   HENT:   Head: Normocephalic and atraumatic.   ~1cm Scab near left perilacrimal area on nasal bridge from treatment for BCC   Eyes: Conjunctivae and EOM are normal.   Pulmonary/Chest: Effort normal.   Musculoskeletal:        Left elbow: He exhibits laceration (2.5 cm laceration healing. No sutures. Steri strips now not present. No erythema, edema, discharge. Dressing c/d/i. ). He exhibits normal range of motion and no swelling. No tenderness found.        Right knee: He exhibits laceration (abrasion/ laceration present. No erythema, edema, warmth to the touch. Wound is healing. Bandage c/d/i. No discharge. ). He exhibits normal range of motion and no swelling. No tenderness found.        Left knee: He exhibits ecchymosis (small scab present on left patella. Healing well. ). He exhibits normal range of motion and no swelling. No tenderness found.   Neurological: He is alert. Coordination normal.   Skin: Ecchymosis (on left hip/ buttock. ) and laceration (left elbow and right knee) noted. He is not diaphoretic.        Vitals reviewed.  Walked without shuffling gait. No cogwheel rigidity.     Results:   No testing ordered today    Assessment and Plan      Jaydon Alegria is a 83 year old with a history of Alzheimer's dementia, hypertension, DMII, and peripheral neuropathy who presents for fall follow up on 1/3/19.     Diagnoses and all orders for this visit:    Fall, initial encounter  -     [facility]; Future  Laceration of right knee, initial encounter  Laceration of left elbow, initial encounter  Recovering appropriately. Wounds are dressed and show no signs of an infectious process.  "Reassured wife that she is taking care of patient well- continue to bandage daily. Given gauze and tape, some ointment. Told patient to use Vaseline on scabs to help healing.   Concerned that patient has had mechanical fall twice in the past few months whenever walking on sidewalk- patient's wife thinks she needs to \"hold onto him constantly\" but it may be that patient needs an assistive device to help him continue to be mobile. Physical therapy referral today- patient and wife agree this evaluation could be helpful. Return precautions given for infection precaution, told to call clinic or go to ED. Follow up in 1 month (or sooner if needed) with PCP for chronic medical conditions.      There are no discontinued medications.    Options for treatment and follow-up care were reviewed with the patient. Jaydon Alegria  engaged in the decision making process and verbalized understanding of the options discussed and agreed with the final plan.    Tono Archibald MD  PGY-1 FM  "

## 2019-01-07 NOTE — PROGRESS NOTES
Preceptor Attestation:   Patient seen, evaluated and discussed with the resident. I have verified the content of the note, which accurately reflects my assessment of the patient and the plan of care.   Supervising Physician:  Kinza Apodaca MD

## 2019-01-07 NOTE — PATIENT INSTRUCTIONS
Here is the plan from today's visit    1. Fall, initial encounter  2. Laceration of right knee, initial encounter  3. Laceration of left elbow, initial encounter  - [facility]; Future  Continue to bandage wounds as you are. Given supplies for gauze and tape. Watch for fever, chills, nausea, vomiting, increased pain, warmth on wound sites, swelling or pus drainage. Call the clinic right away if this occurs as we want to make sure patient does not have an infection.    Please call or return to clinic if your symptoms don't go away.    Follow up plan  Please make a clinic appointment for follow up with your primary physician Steffen Bingham MD in 1 month for diabetes follow up and other healthcare concerns. Follow up sooner if needed for fall follow up.     Thank you for coming to [facility] today.  Lab Testing:  **If you had lab testing today and your results are reassuring or normal they will be mailed to you or sent through [employer] within 7 days.   **If the lab tests need quick action we will call you with the results.  The phone number we will call with results is # [phone] (home) none (work). If this is not the best number please call our clinic and change the number.  Medication Refills:  If you need any refills please call your pharmacy and they will contact us.   If you need to  your refill at a new pharmacy, please contact the new pharmacy directly. The new pharmacy will help you get your medications transferred faster.   Scheduling:  If you have any concerns about today's visit or wish to schedule another appointment please call our office during normal business hours [phone] (8-5:00 M-F)  If a referral was made to a [facility] and you don't get a call from central scheduling please call [phone].  If a Mammogram was ordered for you at [facility] call [phone] to schedule or change your appointment.  If you had an XRay/CT/Ultrasound/MRI  ordered the number is [phone] to schedule or change your radiology appointment.   Medical Concerns:  If you have urgent medical concerns please call [phone] at any time of the day.    Tono Archibald MD

## 2019-01-13 ENCOUNTER — TELEPHONE (OUTPATIENT)
Dept: FAMILY MEDICINE | Facility: CLINIC | Age: 84
End: 2019-01-13

## 2019-01-13 NOTE — TELEPHONE ENCOUNTER
Received in clinic.,  Patient's wife calling with concerns about her  Jaydon.  Patient had fallen, was seen at urgent care, then followed up in clinic with Dr. Archibald.  Patient's wife is concerned because she notices a dry crust from where patient had fallen on his knee, with about 1 inch of redness around it.  It is painful to touch, but describes areas not swollen, patient is able to move his knee without difficulty or significant pain, no fever, no pustular discharge.  I explained that it does not sound like an infection resulting in emergent evaluation, I did recommend a follow-up to have a physician lay eyes on it this week, preferably Monday, and instructed patient's wife to bring him in to urgent care or emergency room of the previously described symptoms.    Gertrude Young,   PGY2 Family Medicine Resident  Pager: [phone]

## 2019-01-14 ENCOUNTER — OFFICE VISIT (OUTPATIENT)
Dept: FAMILY MEDICINE | Facility: CLINIC | Age: 84
End: 2019-01-14
Payer: MEDICARE

## 2019-01-14 VITALS
TEMPERATURE: 98 F | BODY MASS INDEX: 22.08 KG/M2 | OXYGEN SATURATION: 98 % | SYSTOLIC BLOOD PRESSURE: 126 MMHG | WEIGHT: 144 LBS | HEART RATE: 74 BPM | DIASTOLIC BLOOD PRESSURE: 73 MMHG

## 2019-01-14 DIAGNOSIS — L03.115 CELLULITIS OF RIGHT LOWER EXTREMITY: Primary | ICD-10-CM

## 2019-01-14 RX ORDER — SULFAMETHOXAZOLE/TRIMETHOPRIM 800-160 MG
1 TABLET ORAL 2 TIMES DAILY
Qty: 10 TABLET | Refills: 0 | Status: SHIPPED | OUTPATIENT
Start: 2019-01-14 | End: 2019-05-29

## 2019-01-14 ASSESSMENT — ENCOUNTER SYMPTOMS
COUGH: 0
ABDOMINAL PAIN: 0
TROUBLE SWALLOWING: 0
FEVER: 0
DYSURIA: 0
EYE PAIN: 0
WHEEZING: 0

## 2019-01-14 NOTE — PATIENT INSTRUCTIONS
Here is the plan from today's visit    1. Cellulitis of right lower extremity (Knee)  Take the medication as below it should improve by Wednesday if not come back or send a picture  - sulfamethoxazole-trimethoprim (BACTRIM DS/SEPTRA DS) 800-160 MG tablet; Take 1 tablet by mouth 2 times daily for 5 days  Dispense: 10 tablet; Refill: 0      Please call or return to clinic if your symptoms don't go away.    Follow up plan  Follow up if you are not improving in the next 2 days.    Thank you for coming to [facility] today.  Lab Testing:  **If you had lab testing today and your results are reassuring or normal they will be mailed to you or sent through [employer] within 7 days.   **If the lab tests need quick action we will call you with the results.  The phone number we will call with results is # [phone] (home) none (work). If this is not the best number please call our clinic and change the number.  Medication Refills:  If you need any refills please call your pharmacy and they will contact us.   If you need to  your refill at a new pharmacy, please contact the new pharmacy directly. The new pharmacy will help you get your medications transferred faster.   Scheduling:  If you have any concerns about today's visit or wish to schedule another appointment please call our office during normal business hours [phone] (8-5:00 M-F)  If a referral was made to a [facility] and you don't get a call from central scheduling please call [phone].  If a Mammogram was ordered for you at [facility] call [phone] to schedule or change your appointment.  If you had an XRay/CT/Ultrasound/MRI ordered the number is [phone] to schedule or change your radiology appointment.   Medical Concerns:  If you have urgent medical concerns please call [phone] at any time of the day.    Mendel Holley MD

## 2019-01-14 NOTE — PROGRESS NOTES
Jaydon Romero is a 83 year old  who presents for   Patient presents with:  Knee Pain: right,,      Assessment and Plan      1. Cellulitis of right lower extremity (Knee)  Take the medication as below it should improve by Wednesday if not come back or send a picture  - sulfamethoxazole-trimethoprim (BACTRIM DS/SEPTRA DS) 800-160 MG tablet; Take 1 tablet by mouth 2 times daily for 5 days  Dispense: 10 tablet; Refill: 0      Please call or return to clinic if your symptoms don't go away.    Follow up plan  Follow up if you are not improving in the next 2 days.           There are no discontinued medications.    Options for treatment and follow-up care were reviewed with the patient. Jaydon Romero Airam  engaged in the decision making process and verbalized understanding of the options discussed and agreed with the final plan.    Mendel Holley MD         HPI       Jaydon Romero is a 83 year old  who presents for   Patient presents with:  Knee Pain: right,,      Joint/Muscle Pain  Patient presents with his wife he has a history of moderate dementia and answers direct questions though is not oriented to place or time.  On January 3 patient had a fall in which she injured his knees and his elbow he was seen at urgent care was asked to follow-up with his primary care physician he did see Dr. Archibald on 1/7/2019 and at that time was diagnosis of having skin tear laceration of his right knee.  Presents today with a concern that it is the area is getting more painful over his right knee and redness is increasing over that area.  They have been treating it with covering it and putting antibiotic ointment on it.  Wife is concerned that it is getting worse.     +++++++      Problem, Medication and Allergy Lists were reviewed and updated if needed..    Patient is an established patient of this clinic..    Health Maintenance  Health Maintenance Due   Topic Date Due     WELLNESS VISIT Q1 YR  1935     ZOSTER  IMMUNIZATION (2 of 3) 05/01/2008     FOOT EXAM Q1 YEAR  07/13/2017     LIPID MONITORING Q1 YEAR  01/18/2018     TSH W/ FREE T4 REFLEX Q2 YEAR  02/24/2018            Review of Systems:   Review of Systems   Constitutional: Negative for fever.   HENT: Negative for trouble swallowing.    Eyes: Negative for pain.   Respiratory: Negative for cough and wheezing.    Cardiovascular: Negative for chest pain.   Gastrointestinal: Negative for abdominal pain.   Genitourinary: Negative for dysuria.            Physical Exam:     Vitals:    01/14/19 1440   BP: 126/73   Pulse: 74   Temp: 98  F (36.7  C)   SpO2: 98%   Weight: 65.3 kg (144 lb)     Body mass index is 22.08 kg/m .  Vitals were reviewed and were normal     Physical Exam   Constitutional: He appears well-developed and well-nourished. No distress.   HENT:   Head: Normocephalic and atraumatic.   Eyes: Conjunctivae and EOM are normal.   Pulmonary/Chest: Effort normal.   Musculoskeletal:        Left elbow: Lacerations: 2.5 cm laceration healing. No sutures. Steri strips now not present. No erythema, edema, discharge. Dressing c/d/i.         Right knee: He exhibits laceration (Abrasion surrounding erythema and tenderness. ). He exhibits normal range of motion and no swelling. No tenderness found.        Left knee: He exhibits ecchymosis (small scab present on left patella. Healing well. ). He exhibits normal range of motion and no swelling. No tenderness found.   Neurological: He is alert. Coordination normal.   Skin: Laceration (left elbow and right knee) noted. He is not diaphoretic.        Vitals reviewed.            Results:   No testing ordered today

## 2019-01-15 ENCOUNTER — DOCUMENTATION ONLY (OUTPATIENT)
Dept: OTHER | Facility: CLINIC | Age: 84
End: 2019-01-15

## 2019-01-18 ENCOUNTER — HOSPITAL ENCOUNTER (OUTPATIENT)
Dept: PHYSICAL THERAPY | Facility: CLINIC | Age: 84
Setting detail: THERAPIES SERIES
End: 2019-01-18
Attending: STUDENT IN AN ORGANIZED HEALTH CARE EDUCATION/TRAINING PROGRAM
Payer: MEDICARE
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
## 2019-01-18 PROCEDURE — 97110 THERAPEUTIC EXERCISES: CPT | Mod: GP | Performed by: PHYSICAL THERAPIST

## 2019-01-18 PROCEDURE — 97116 GAIT TRAINING THERAPY: CPT | Mod: GP | Performed by: PHYSICAL THERAPIST

## 2019-01-18 PROCEDURE — 97162 PT EVAL MOD COMPLEX 30 MIN: CPT | Mod: GP | Performed by: PHYSICAL THERAPIST

## 2019-01-18 ASSESSMENT — 6 MINUTE WALK TEST (6MWT): TOTAL DISTANCE WALKED (FT): 400

## 2019-01-18 NOTE — PROGRESS NOTES
[facility]        OUTPATIENT PHYSICAL THERAPY FUNCTIONAL EVALUATION  PLAN OF TREATMENT FOR OUTPATIENT REHABILITATION  (COMPLETE FOR INITIAL CLAIMS ONLY)  Patient's Last Name, First Name, M.I.  YOB: 1935  Jaydon Alegria     Provider's Name   [facility]   Medical Record No.  [id number]     Start of Care Date:  01/18/19   Onset Date:  01/07/19   Type:     _X__PT   ____OT  ____SLP Medical Diagnosis:  Falls     PT Diagnosis:  difficulty with gait Visits from SOC:  1                              __________________________________________________________________________________  Plan of Treatment/Functional Goals:  balance training, gait training, neuromuscular re-education, strengthening, stretching, transfer training           GOALS  6MWT  1. Pt will complete 6MWT with AD as needed safely and independently to demonsrate improvement from evaluation where assessment was terminated for safety.  04/03/19    NARAYAN  2. Patient will score greater than 45/56 on the Narayan balance assessment in order to decrease fall risk.  04/03/19    AD  3. Pt will safely ambulate with 4WW outdoors and in the community to decrease fall riske.  04/03/19    Therapy Frequency:  1 time/week   Predicted Duration of Therapy Intervention:  8 weeks    Evelina Myrick, PT                                    I CERTIFY THE NEED FOR THESE SERVICES FURNISHED UNDER        THIS PLAN OF TREATMENT AND WHILE UNDER MY CARE     (Physician co-signature of this document indicates review and certification of the therapy plan).                Certification Date From:  01/18/19   Certification Date To:  04/03/19    Referring Provider:  Dr. Tono Archibald    Initial Assessment  See Epic Evaluation- Start of Care Date: 01/18/19

## 2019-01-18 NOTE — PROGRESS NOTES
01/18/19 1000   Quick Adds   Quick Adds Certification   Type of Visit Initial OP PT Evaluation       Present No   General Information   Start of Care Date 01/18/19   Referring Physician Dr. Tono Archibald   Orders Evaluate and Treat as Indicated   Additional Orders Physical therapy to assess for need of assisted walking devices   Order Date 01/07/19   Medical Diagnosis Falls   Onset of illness/injury or Date of Surgery 01/07/19   Precautions/Limitations fall precautions   Surgical/Medical history reviewed Yes   Pertinent history of current problem (include personal factors and/or comorbidities that impact the POC) Pt had a fall outside, tripped around a threshold near landscaping near their condo, fall with laceration to elbow and knee. Developed cellulitis in knee and finishing last day of abx. 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 Wife reports pt wakes,has breakfast and sleeps until lunch, sleeps until 4pm, awake until dinner around 7pm and bed by 8pm. 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 Wife will leave while pt is sleeping and has a Nest camera in the house to monitor his movements.   Assistive Devices Comments no current AD or equipment, but very interested in obtaining.   Patient/Family Goals Statement per wife, would be interested in getting a walker   General Information Comments wife with many questions about general safety and equipment- grab bars, raised toilet seat and shower chair.    Fall Risk Screen   Fall screen completed by PT   Have you fallen 2 or more times in the past year? Yes   Have you fallen and had an injury in the past  year? Yes   Timed Up and Go score (seconds) 16.71   Is patient a fall risk? Yes   Cognitive Status Examination   Level of Consciousness alert   Cognitive Comment pt did not speak or respond to any questions from therapist, turned to wife who responded and elaborated. pt did respond directly to wife in [place], but otherwise did not verbalize during session.   Integumentary   Integumentary Comments lacerations covered at knee and elbow.    Posture   Posture Comments ridgid posture   Strength   Strength Comments slow to respond with all commands, limited follow through to commands.   Bed Mobility   Bed Mobility Comments independent per report   Transfer Skills   Transfer Comments rises with heavy assist from UEs   Gait   Gait Comments ambulates unassisted into the clinic with wife assisting. No AD, short steps, unsteady with turns, waits for wife to help direct    Gait Special Tests Six Minute Walk Test   Feet 400 Feet   Comments Stopped assessment at 3:30 due to unsafe/balance concerns while turning.   Balance Special Tests Modified CTSIB Conditions   Condition 1, seconds 30 Seconds   Condition 2, seconds 30 Seconds   Condition 4, seconds 9 Seconds  (with feet shoulder width apart)   Condition 5, seconds 5 Seconds  (with feet shoulder width apart)   Sensory Examination   Sensory Perception Comments peripheral neuropathy bilaterally at feet to ankles   Coordination   Coordination Comments delayed bilaterally during toe taps   Planned Therapy Interventions   Planned Therapy Interventions balance training;gait training;neuromuscular re-education;strengthening;stretching;transfer training   Clinical Impression   Criteria for Skilled Therapeutic Interventions Met yes, treatment indicated   PT Diagnosis difficulty with gait   Influenced by the following impairments fall history, stroke history, declining independent and reliance on wife, cognition changes, requires AD for safety   Functional limitations due to impairments  needs assist and supervision for all mobility, ADL/IADLs due to cogntion changes, limited safety awareness, speech deficits since CVI.   Clinical Presentation Evolving/Changing   Clinical Presentation Rationale frequent falls, comorbidities   Clinical Decision Making (Complexity) Moderate complexity   Therapy Frequency 1 time/week   Predicted Duration of Therapy Intervention (days/wks) 8 weeks   Risk & Benefits of therapy have been explained Yes   Patient, Family & other staff in agreement with plan of care Yes   Clinical Impression Comments Pt initially in agreement and after scheduling handed a reference sheet and patient upset that he's scheduled for ongoing therapy.    Education Assessment   Preferred Learning Style Demonstration   Barriers to Learning Cognitive   Total Evaluation Time   PT Eval, Moderate Complexity Minutes ([id number]) 20   Therapy Certification   Certification date from 01/18/19   Certification date to 04/03/19   Medical Diagnosis Falls

## 2019-02-06 ENCOUNTER — OFFICE VISIT (OUTPATIENT)
Dept: FAMILY MEDICINE | Facility: CLINIC | Age: 84
End: 2019-02-06
Payer: MEDICARE

## 2019-02-06 VITALS
WEIGHT: 147.2 LBS | SYSTOLIC BLOOD PRESSURE: 121 MMHG | HEART RATE: 70 BPM | BODY MASS INDEX: 22.57 KG/M2 | DIASTOLIC BLOOD PRESSURE: 72 MMHG | OXYGEN SATURATION: 99 % | RESPIRATION RATE: 16 BRPM

## 2019-02-06 DIAGNOSIS — H60.391 INFECTIVE OTITIS EXTERNA, RIGHT: ICD-10-CM

## 2019-02-06 DIAGNOSIS — E11.9 TYPE 2 DIABETES MELLITUS WITHOUT COMPLICATION, WITHOUT LONG-TERM CURRENT USE OF INSULIN (H): ICD-10-CM

## 2019-02-06 DIAGNOSIS — I10 ESSENTIAL HYPERTENSION: ICD-10-CM

## 2019-02-06 DIAGNOSIS — G30.1 LATE ONSET ALZHEIMER'S DISEASE WITHOUT BEHAVIORAL DISTURBANCE (H): Primary | ICD-10-CM

## 2019-02-06 DIAGNOSIS — F02.80 LATE ONSET ALZHEIMER'S DISEASE WITHOUT BEHAVIORAL DISTURBANCE (H): Primary | ICD-10-CM

## 2019-02-06 ASSESSMENT — ENCOUNTER SYMPTOMS
CONFUSION: 1
SHORTNESS OF BREATH: 0
NERVOUS/ANXIOUS: 0
WEAKNESS: 0
APPETITE CHANGE: 0
CHEST TIGHTNESS: 0
AGITATION: 0
UNEXPECTED WEIGHT CHANGE: 0
ACTIVITY CHANGE: 0
FEVER: 0
SLEEP DISTURBANCE: 1
DYSPHORIC MOOD: 0

## 2019-02-06 NOTE — PATIENT INSTRUCTIONS
Resources:    1.  [place] [employer]    2.  Dr. Garfield Crook [facility].  Caring For a Person with Memory Loss.      Arrange for ENT appointment to look at R ear.

## 2019-02-06 NOTE — PROGRESS NOTES
HPI:       83 year old patient who presents with:    F/u dementia and other conditons.  Presents with wife who is primary and almost sole caregiver and daugher-in-law Susanne.    His cognitive status is worsening.  He has gotten lost a few times and has needed assistance in how to properly use a toothbrush.  Mood is generally good and he does not resist cares.      He sleeps excessively.  Sometimes will go to bed at 6 or 7 and sleep through the night.  He is not up at night.  When awake he does not seem drowsy.    Susanne is concerned about pt's wife getting burned out.  She takes pt everywhere and he is virtually never out of her sight.    He denies any complaints.       Problem, Medication and Allergy Lists were reviewed and are current.  Patient is an established patient of this clinic.         Review of Systems:     Review of Systems   Constitutional: Negative for activity change, appetite change, fever and unexpected weight change.   Respiratory: Negative for chest tightness and shortness of breath.    Cardiovascular: Negative for chest pain and leg swelling.   Musculoskeletal: Negative for gait problem.   Neurological: Negative for syncope and weakness.   Psychiatric/Behavioral: Positive for confusion and sleep disturbance (excessive). Negative for agitation, behavioral problems and dysphoric mood. The patient is not nervous/anxious.           Physical Exam:     /72   Pulse 70   Resp 16   Wt 66.8 kg (147 lb 3.2 oz)   SpO2 99%   BMI 22.57 kg/m      Body mass index is 22.57 kg/m .  Vitals reviewed.    Physical Exam   Constitutional: He appears well-developed and well-nourished. No distress.   HENT:   Right Ear: External ear normal. There is drainage. No tenderness.   Left Ear: Tympanic membrane and ear canal normal. No drainage.   Copious necrotic material and purulent discharge in R ear canal obstructing TM.  NT   Eyes: EOM are normal. Pupils are equal, round, and reactive to light.   Neck: No JVD  present. No thyromegaly present.   Cardiovascular: Normal rate, regular rhythm and intact distal pulses. Exam reveals no friction rub.   Murmur (3/6 CLINT aortic) heard.  Pulmonary/Chest: Effort normal and breath sounds normal.   Abdominal: Soft.   Musculoskeletal: Normal range of motion. He exhibits no edema.   Lymphadenopathy:     He has no cervical adenopathy.   Neurological: He is alert. He exhibits normal muscle tone.   Skin: He is not diaphoretic.   Psychiatric: He has a normal mood and affect.   + memory loss.  Much less verbal than six months ago.  He is alert and in NAD.   Vitals reviewed.          Results:     Results from last visit:  Office Visit on 09/12/2018   Component Date Value Ref Range Status     Urea Nitrogen 09/12/2018 16.6  7.0 - 21.0 mg/dL Final     Calcium 09/12/2018 10.0  8.5 - 10.1 mg/dL Final     Chloride 09/12/2018 98.9  98.0 - 110.0 mmol/L Final     Carbon Dioxide 09/12/2018 27.6  20.0 - 32.0 mmol/L Final     Creatinine 09/12/2018 0.7  0.7 - 1.3 mg/dL Final     Glucose 09/12/2018 151.6* 70.0 - 99.0 mg'dL Final     Potassium 09/12/2018 3.8  3.3 - 4.5 mmol/dL Final     Sodium 09/12/2018 134.9  132.6 - 141.4 mmol/L Final     GFR Estimate 09/12/2018 >90  >60.0 mL/min/1.7 m2 Final     GFR Estimate If Black 09/12/2018 >90  >60.0 mL/min/1.7 m2 Final     Hemoglobin A1C 09/12/2018 7.0* 4.1 - 5.7 % Final     Creatinine Urine 09/12/2018 120  mg/dL Final     Albumin Urine mg/L 09/12/2018 14  mg/L Final     Albumin Urine mg/g Cr 09/12/2018 11.33  0 - 17 mg/g Cr Final     Assessment and Plan     Jaydon Romero was seen today for memory loss and sleep problem.    Diagnoses and all orders for this visit:    Late onset Alzheimer's disease without behavioral disturbance.  Discussed progression of disease well into middle stage.   Long discussion with wife and daughter in law about importance of caregiver assistance.  I directed them to some resources for this.  They wish to update his POLST form and we  will do this at next visit.  In the meantime both wife and DIL are very confident that he would want DNR/DNI status at this point.    -     DNR/DNI    Type 2 diabetes mellitus without complication, without long-term current use of insulin (H).  Stable.    Essential hypertension.  At goal.    Infective otitis externa, right.  Will have ENT evaluate.  He does have a reported h/o R TM perforation.        Options for treatment and follow-up care were reviewed with the patient. Jaydon Alegria engaged in the decision making process and verbalized understanding of the options discussed and agreed with the final plan.    Steffen Bingham MD

## 2019-02-19 ENCOUNTER — OFFICE VISIT (OUTPATIENT)
Dept: PODIATRY | Facility: CLINIC | Age: 84
End: 2019-02-19
Payer: MEDICARE

## 2019-02-19 VITALS — SYSTOLIC BLOOD PRESSURE: 123 MMHG | OXYGEN SATURATION: 98 % | HEART RATE: 76 BPM | DIASTOLIC BLOOD PRESSURE: 68 MMHG

## 2019-02-19 DIAGNOSIS — E11.49 TYPE II OR UNSPECIFIED TYPE DIABETES MELLITUS WITH NEUROLOGICAL MANIFESTATIONS, NOT STATED AS UNCONTROLLED(250.60) (H): ICD-10-CM

## 2019-02-19 DIAGNOSIS — E11.51 DIABETES MELLITUS WITH PERIPHERAL VASCULAR DISEASE (H): ICD-10-CM

## 2019-02-19 DIAGNOSIS — B35.1 ONYCHOMYCOSIS: Primary | ICD-10-CM

## 2019-02-19 PROCEDURE — 99213 OFFICE O/P EST LOW 20 MIN: CPT | Performed by: PODIATRIST

## 2019-02-19 NOTE — NURSING NOTE
Jaydon Alegria's chief complaint for this visit includes:  Chief Complaint   Patient presents with     Left Foot - Toenail     Right Foot - Toenail     PCP: Steffen Bingham    Referring Provider:  Steffen Bingham MD  [address] [address]    /68 (BP Location: Right arm, Patient Position: Sitting, Cuff Size: Adult Large)   Pulse 76   SpO2 98%   Data Unavailable     Do you need any medication refills at today's visit? Ashlyn Tamayo LPN

## 2019-02-19 NOTE — PROGRESS NOTES
Past Medical History:   Diagnosis Date     Depressive disorder      Diabetes (H)      Hypertension      Skin cancer 8/25/2016     Patient Active Problem List   Diagnosis     Essential hypertension     Peripheral neuropathy     HL (hearing loss), bilateral     Glaucoma of both eyes     Hyperlipidemia LDL goal <130     Chronic constipation     Late onset Alzheimer's disease without behavioral disturbance     Type 2 diabetes mellitus without complication (H)     Aortic stenosis     Benign essential hypertension     Type 2 diabetes mellitus without complication, without long-term current use of insulin (H)     Cerebrovascular disease     Infective otitis externa, right     Past Surgical History:   Procedure Laterality Date     CHOLECYSTECTOMY       EXCISE LESION EYELID Right 4/26/2016    Procedure: EXCISE LESION EYELID;  Surgeon: Abelino Pichardo MD;  Location: [facility]     HEAD & NECK SURGERY       ORTHOPEDIC SURGERY      shoulder right      Social History     Socioeconomic History     Marital status:      Spouse name: Not on file     Number of children: Not on file     Years of education: Not on file     Highest education level: Not on file   Social Needs     Financial resource strain: Not on file     Food insecurity - worry: Not on file     Food insecurity - inability: Not on file     Transportation needs - medical: Not on file     Transportation needs - non-medical: Not on file   Occupational History     Not on file   Tobacco Use     Smoking status: Former Smoker     Smokeless tobacco: Never Used   Substance and Sexual Activity     Alcohol use: Yes     Comment: occasionally     Drug use: No     Sexual activity: Not Currently   Other Topics Concern     Not on file   Social History Narrative     Not on file     Family History   Problem Relation Age of Onset     Other Cancer Sister      Diabetes No family hx of      Coronary Artery Disease No family hx of      Hypertension No family hx of      Hyperlipidemia  No family hx of      Cerebrovascular Disease No family hx of      Breast Cancer No family hx of      Colon Cancer No family hx of      Prostate Cancer No family hx of      Depression No family hx of      Anxiety Disorder No family hx of      Substance Abuse No family hx of      Mental Illness No family hx of      Lab Results   Component Value Date    A1C 7.0 09/12/2018    A1C 6.5 03/07/2018    A1C 7.0 10/11/2017    A1C 7.5 07/19/2017    A1C 6.4 01/18/2017     SUBJECTIVE FINDINGS:  83-year-old male returns to clinic for onychomycosis bilaterally.  He is diabetic with peripheral neuropathy, plantar wart left foot, tinea pedis.  Relates they are using the Loprox cream, no ulcers or sores since we have seen him last, not sure if he has numbness, tingling, peripheral neuropathy on his feet.       OBJECTIVE FINDINGS:  DP and PT are 1/4 bilaterally.  He has decreased hair growth bilaterally.  He has some peripheral edema with venous stasis bilaterally.  There is no erythema, no drainage, no odor, no calor bilaterally.  He has dystrophic, thickened, brittle nails with subungual debris 1-5 bilaterally to differing degrees.  He has mild dry scaly skin in a moccasin pattern on the right foot.  Sharp/dull is intact with 5.07 [place] weinstien monofilament, except he did not feel it on the first attempt on the right dorsal foot but felt it a second time.     ASSESSMENT AND PLAN:  Onychomycosis bilaterally, diabetes with peripheral neuropathy, tinea pedis that is improved, plantar wart appear to be nearly resolved.  Diagnosis and treatment discussed with him.  He is diabetic with peripheral neuropathy and vascular disease and onychomycosis.  All the nails 1-5 bilaterally were debrided upon consent.  Continue the Loprox cream.  Return to clinic to see me in 2-3 months.

## 2019-02-19 NOTE — LETTER
2/19/2019         RE: Jaydon Alegria  [address]   [address]  [place] [id number]        Dear Colleague,    Thank you for referring your patient, Jaydon Alegria, to the [facility]. Please see a copy of my visit note below.    Past Medical History:   Diagnosis Date     Depressive disorder      Diabetes (H)      Hypertension      Skin cancer 8/25/2016     Patient Active Problem List   Diagnosis     Essential hypertension     Peripheral neuropathy     HL (hearing loss), bilateral     Glaucoma of both eyes     Hyperlipidemia LDL goal <130     Chronic constipation     Late onset Alzheimer's disease without behavioral disturbance     Type 2 diabetes mellitus without complication (H)     Aortic stenosis     Benign essential hypertension     Type 2 diabetes mellitus without complication, without long-term current use of insulin (H)     Cerebrovascular disease     Infective otitis externa, right     Past Surgical History:   Procedure Laterality Date     CHOLECYSTECTOMY       EXCISE LESION EYELID Right 4/26/2016    Procedure: EXCISE LESION EYELID;  Surgeon: Abelino Pichardo MD;  Location: [facility]     HEAD & NECK SURGERY       ORTHOPEDIC SURGERY      shoulder right      Social History     Socioeconomic History     Marital status:      Spouse name: Not on file     Number of children: Not on file     Years of education: Not on file     Highest education level: Not on file   Social Needs     Financial resource strain: Not on file     Food insecurity - worry: Not on file     Food insecurity - inability: Not on file     Transportation needs - medical: Not on file     Transportation needs - non-medical: Not on file   Occupational History     Not on file   Tobacco Use     Smoking status: Former Smoker     Smokeless tobacco: Never Used   Substance and Sexual Activity     Alcohol use: Yes     Comment: occasionally     Drug use: No     Sexual  activity: Not Currently   Other Topics Concern     Not on file   Social History Narrative     Not on file     Family History   Problem Relation Age of Onset     Other Cancer Sister      Diabetes No family hx of      Coronary Artery Disease No family hx of      Hypertension No family hx of      Hyperlipidemia No family hx of      Cerebrovascular Disease No family hx of      Breast Cancer No family hx of      Colon Cancer No family hx of      Prostate Cancer No family hx of      Depression No family hx of      Anxiety Disorder No family hx of      Substance Abuse No family hx of      Mental Illness No family hx of      Lab Results   Component Value Date    A1C 7.0 09/12/2018    A1C 6.5 03/07/2018    A1C 7.0 10/11/2017    A1C 7.5 07/19/2017    A1C 6.4 01/18/2017     SUBJECTIVE FINDINGS:  83-year-old male returns to clinic for onychomycosis bilaterally.  He is diabetic with peripheral neuropathy, plantar wart left foot, tinea pedis.  Relates they are using the Loprox cream, no ulcers or sores since we have seen him last, not sure if he has numbness, tingling, peripheral neuropathy on his feet.       OBJECTIVE FINDINGS:  DP and PT are 1/4 bilaterally.  He has decreased hair growth bilaterally.  He has some peripheral edema with venous stasis bilaterally.  There is no erythema, no drainage, no odor, no calor bilaterally.  He has dystrophic, thickened, brittle nails with subungual debris 1-5 bilaterally to differing degrees.  He has mild dry scaly skin in a moccasin pattern on the right foot.   Sharp/dull is intact with 5.07 [place] weinstien monofilament, except he did not feel it on the first attempt on the right dorsal foot but felt it a second time.     ASSESSMENT AND PLAN:  Onychomycosis bilaterally, diabetes with peripheral neuropathy, tinea pedis that is improved, plantar wart appear to be nearly resolved.  Diagnosis and treatment discussed with him.  He is diabetic with peripheral neuropathy and vascular disease and  onychomycosis.  All the nails 1-5 bilaterally were debrided upon consent.  Continue the Loprox cream.  Return to clinic to see me in 2-3 months.     Again, thank you for allowing me to participate in the care of your patient.        Sincerely,        Garfield Fisher DPM

## 2019-02-19 NOTE — PATIENT INSTRUCTIONS
Thanks for coming today.  [facility]  [address] [place] [address]    To schedule future appointments in Ortho Clinic, you may call [phone].    To schedule ordered imaging by your provider:   Call [facility] [phone]    To schedule an injection ordered by your provider:  Call [facility] Injection scheduling line: [phone]  S[employer] available online at:  [email or web address]    Please call if any further questions or concerns ([phone]).  Clinic hours 8 am to 5 pm.    Return to clinic (call) if symptoms worsen or fail to improve.

## 2019-02-21 DIAGNOSIS — F43.22 ADJUSTMENT DISORDER WITH ANXIOUS MOOD: ICD-10-CM

## 2019-02-21 NOTE — TELEPHONE ENCOUNTER
"Per PCP \"I need more info before refilling this.  Please call pts wife (pt has dementia).  Does the patient still get anxious?  How often does he need the lorazepam for anxiety?  When he gets it does it help? \"    RN called pt's wife to inquire. Pt's wife states he still gets anxious at periodic times during the day. Wife states they have used the lorazepam a couple times a week, not every day. Lorazepam is helpful per wife    Rebekah Flores RN    "

## 2019-02-21 NOTE — TELEPHONE ENCOUNTER
Verify that the refill encounter hasn't been started Yes    [facility] phone call message- patient requesting a refill:    Full Medication Name: LORazepam (ATIVAN) 0.5 MG tablet    Dose: Take 1 tablet (0.5 mg) by mouth every 8 hours as needed for anxiety - Oral     Pharmacy confirmed as   [id number] - [place], [place] - [address] [facility]  [address] [place] [address]  Phone: [phone] Fax: [phone]    : Yes    Medication tab checked to see if medication has been sent  Yes    Additional Comments: Patient has 2 pills left     OK to leave a message on voice mail? Yes    Advised patient refill may take up to 2 business days? Yes    Primary language: English      needed? No    Call taken on February 21, 2019 at 1:24 PM by Monisha Schwartz    Route to  SMI MED REFILL

## 2019-02-21 NOTE — TELEPHONE ENCOUNTER

## 2019-02-22 RX ORDER — LORAZEPAM 0.5 MG/1
0.5 TABLET ORAL EVERY 8 HOURS PRN
Qty: 20 TABLET | Refills: 0 | Status: SHIPPED | OUTPATIENT
Start: 2019-02-22

## 2019-03-09 ENCOUNTER — TELEPHONE (OUTPATIENT)
Dept: FAMILY MEDICINE | Facility: CLINIC | Age: 84
End: 2019-03-09

## 2019-03-09 NOTE — TELEPHONE ENCOUNTER
Patient's wife called clinic pager with concerns.  Patient feeling weak with pain all over. Took BP at home, 180/80 and recheck 190/90  Recommended ED evaluation.     Phoebe Freitas MD

## 2019-03-20 ENCOUNTER — OFFICE VISIT (OUTPATIENT)
Dept: FAMILY MEDICINE | Facility: CLINIC | Age: 84
End: 2019-03-20
Payer: MEDICARE

## 2019-03-20 VITALS
DIASTOLIC BLOOD PRESSURE: 69 MMHG | SYSTOLIC BLOOD PRESSURE: 113 MMHG | WEIGHT: 149.8 LBS | HEART RATE: 80 BPM | BODY MASS INDEX: 22.97 KG/M2 | OXYGEN SATURATION: 98 % | TEMPERATURE: 98 F | RESPIRATION RATE: 16 BRPM

## 2019-03-20 DIAGNOSIS — E11.9 TYPE 2 DIABETES MELLITUS WITHOUT COMPLICATION, WITHOUT LONG-TERM CURRENT USE OF INSULIN (H): ICD-10-CM

## 2019-03-20 DIAGNOSIS — G30.1 LATE ONSET ALZHEIMER'S DISEASE WITH BEHAVIORAL DISTURBANCE (H): Primary | ICD-10-CM

## 2019-03-20 DIAGNOSIS — G47.9 SLEEP DISORDER: ICD-10-CM

## 2019-03-20 DIAGNOSIS — F02.818 LATE ONSET ALZHEIMER'S DISEASE WITH BEHAVIORAL DISTURBANCE (H): Primary | ICD-10-CM

## 2019-03-20 LAB
% GRANULOCYTES: 71.2 %G (ref 40–75)
ALBUMIN SERPL-MCNC: 3.9 G/DL (ref 3.4–5)
ALP SERPL-CCNC: 72 U/L (ref 40–150)
ALT SERPL W P-5'-P-CCNC: 30 U/L (ref 0–70)
ANION GAP SERPL CALCULATED.3IONS-SCNC: 9 MMOL/L (ref 3–14)
AST SERPL W P-5'-P-CCNC: 20 U/L (ref 0–45)
BILIRUB SERPL-MCNC: 0.3 MG/DL (ref 0.2–1.3)
BUN SERPL-MCNC: 17 MG/DL (ref 7–30)
CALCIUM SERPL-MCNC: 9.8 MG/DL (ref 8.5–10.1)
CHLORIDE SERPL-SCNC: 105 MMOL/L (ref 94–109)
CO2 SERPL-SCNC: 27 MMOL/L (ref 20–32)
CREAT SERPL-MCNC: 0.73 MG/DL (ref 0.66–1.25)
CRP SERPL-MCNC: <2.9 MG/L (ref 0–8)
ERYTHROCYTE [SEDIMENTATION RATE] IN BLOOD BY WESTERGREN METHOD: 10 MM/H (ref 0–20)
GFR SERPL CREATININE-BSD FRML MDRD: 86 ML/MIN/{1.73_M2}
GLUCOSE SERPL-MCNC: 133 MG/DL (ref 70–99)
GRANULOCYTES #: 4.3 K/UL (ref 1.6–8.3)
HBA1C MFR BLD: 6.9 % (ref 4.1–5.7)
HCT VFR BLD AUTO: 41.7 % (ref 40–53)
HEMOGLOBIN: 12.1 G/DL (ref 13.3–17.7)
LYMPHOCYTES # BLD AUTO: 1.3 K/UL (ref 0.8–5.3)
LYMPHOCYTES NFR BLD AUTO: 21.2 %L (ref 20–48)
MCH RBC QN AUTO: 30.6 PG (ref 26.5–35)
MCHC RBC AUTO-ENTMCNC: 29 G/DL (ref 32–36)
MCV RBC AUTO: 105.3 FL (ref 78–100)
MID #: 0.5 K/UL (ref 0–2.2)
MID %: 7.6 %M (ref 0–20)
PLATELET # BLD AUTO: 216 K/UL (ref 150–450)
POTASSIUM SERPL-SCNC: 4.2 MMOL/L (ref 3.4–5.3)
PROT SERPL-MCNC: 7.3 G/DL (ref 6.8–8.8)
RBC # BLD AUTO: 3.96 M/UL (ref 4.4–5.9)
SODIUM SERPL-SCNC: 141 MMOL/L (ref 133–144)
TSH SERPL DL<=0.005 MIU/L-ACNC: 1.25 MU/L (ref 0.4–4)
WBC # BLD AUTO: 6.1 K/UL (ref 4–11)

## 2019-03-20 ASSESSMENT — ENCOUNTER SYMPTOMS
HYPERACTIVE: 1
FEVER: 0
CHEST TIGHTNESS: 0
UNEXPECTED WEIGHT CHANGE: 0
NERVOUS/ANXIOUS: 1
SHORTNESS OF BREATH: 0
ACTIVITY CHANGE: 0
WEAKNESS: 0
CONFUSION: 1
DYSPHORIC MOOD: 1
SLEEP DISTURBANCE: 1
APPETITE CHANGE: 0
AGITATION: 1

## 2019-03-20 NOTE — PROGRESS NOTES
HPI:       83 year old patient who presents with:    F/u dementia and other chronic conditions.  Presents with wife and daughter-in-law Susanne.  Last 3 weeks have been very difficult.  His behavior has changed and become problematic.  Becomes nervous frequently.  Requires his wife to be around constantly.  Worst is that his sleep has changed.  He is frequently up at night.  Has awakened wife several times with severe anxiety and she has had a very hard time calming him down.  When she can't get him calmed after extensive efforts, she gives him a lorazepam and he goes to sleep and sleeps through much of the next day.  He does not appear to be in pain.    DIL and wife would like to continue conversation about care preferences.       Problem, Medication and Allergy Lists were reviewed and are current.  Patient is an established patient of this clinic.         Review of Systems:     Review of Systems   Constitutional: Negative for activity change, appetite change, fever and unexpected weight change.   Respiratory: Negative for chest tightness and shortness of breath.    Cardiovascular: Negative for chest pain and leg swelling.   Musculoskeletal: Negative for gait problem.   Neurological: Negative for weakness.   Psychiatric/Behavioral: Positive for agitation, behavioral problems, confusion, dysphoric mood and sleep disturbance (anxious). The patient is nervous/anxious and is hyperactive.           Physical Exam:     /69   Pulse 80   Temp 98  F (36.7  C)   Resp 16   Wt 67.9 kg (149 lb 12.8 oz)   SpO2 98%   BMI 22.97 kg/m      Body mass index is 22.97 kg/m .  Vitals reviewed.    Physical Exam   Constitutional: He appears well-developed and well-nourished. No distress.   Neck: No JVD present. No thyromegaly present.   Cardiovascular: Normal rate, regular rhythm and intact distal pulses. Exam reveals no friction rub.   Murmur (3/6 CLINT aortic) heard.  Pulmonary/Chest: Effort normal and breath sounds normal.  He has no wheezes.   Lymphadenopathy:     He has no cervical adenopathy.   Neurological: He is alert.   Skin: He is not diaphoretic.   Psychiatric:   + memory loss.  Speaks very little.  Alert but mostly unable to follow conversation.   Vitals reviewed.          Results:       Assessment and Plan     Jaydon Romero was seen today for dementia and incontinence.    Diagnoses and all orders for this visit:    Late onset Alzheimer's disease with behavioral disturbance.  Condition has progressed and unfortunately included significant behavioral disturbance.  Long discussion with family and I informed them it was likely he was progressing through middle stage to late stage.  Family is certain that he would favor transitioning to a more palliative care approach and as such we will start to simplify his drug regimen by stopping several meds including donepezil.  Re-evaluate in 4 weeks and if behavioral disturbance continues will consider judicious trial of antipsychotic.  Will check labs to rule out secondary causes of worsening cognition.  -     Cancel: Comprehensive Metabolic Panel ([facility])  -     Hemoglobin A1c ([facility])  -     TSH  -     CBC with Diff Plt ([facility])  -     Cancel: Erythrocyte Sedimentation Rate ([facility])  -     CRP inflammation    Sleep disorder.  See above.  Recommended maximizing daily sun exposure.    Type 2 diabetes mellitus without complication, without long-term current use of insulin (H)  -     Comprehensive metabolic panel  -     Erythrocyte sedimentation rate auto        Options for treatment and follow-up care were reviewed with the patient. Jaydon Gonzalesvaldemar Alegria engaged in the decision making process and verbalized understanding of the options discussed and agreed with the final plan.    Steffen Bingham MD

## 2019-03-20 NOTE — PATIENT INSTRUCTIONS
1.  Stop atorvastatin, calcium, capsaicin, donepezil, multivitamin, fish oil, vitamin D.    2.  Try to maximize sunlight exposure.    3.  Can try melatonin.    4.  [employer] [place] POLST forms.    [email or web address]

## 2019-04-17 ENCOUNTER — OFFICE VISIT (OUTPATIENT)
Dept: FAMILY MEDICINE | Facility: CLINIC | Age: 84
End: 2019-04-17
Payer: MEDICARE

## 2019-04-17 VITALS
OXYGEN SATURATION: 96 % | BODY MASS INDEX: 23.15 KG/M2 | WEIGHT: 151 LBS | HEART RATE: 104 BPM | DIASTOLIC BLOOD PRESSURE: 81 MMHG | SYSTOLIC BLOOD PRESSURE: 136 MMHG

## 2019-04-17 DIAGNOSIS — F02.818 LATE ONSET ALZHEIMER'S DISEASE WITH BEHAVIORAL DISTURBANCE (H): Primary | ICD-10-CM

## 2019-04-17 DIAGNOSIS — G30.1 LATE ONSET ALZHEIMER'S DISEASE WITH BEHAVIORAL DISTURBANCE (H): Primary | ICD-10-CM

## 2019-04-17 DIAGNOSIS — Z71.89 ADVANCE CARE PLANNING: ICD-10-CM

## 2019-04-17 RX ORDER — RISPERIDONE 0.25 MG/1
0.25 TABLET ORAL 2 TIMES DAILY
Qty: 60 TABLET | Refills: 1 | Status: SHIPPED | OUTPATIENT
Start: 2019-04-17 | End: 2019-04-26

## 2019-04-17 ASSESSMENT — ENCOUNTER SYMPTOMS
AGITATION: 1
UNEXPECTED WEIGHT CHANGE: 0
FEVER: 0
HYPERACTIVE: 1
ACTIVITY CHANGE: 0
CONFUSION: 1
SHORTNESS OF BREATH: 0
DYSPHORIC MOOD: 1
SLEEP DISTURBANCE: 1
NERVOUS/ANXIOUS: 1
WEAKNESS: 0
APPETITE CHANGE: 0

## 2019-04-17 NOTE — PATIENT INSTRUCTIONS
Begin risperidone, 0.25 mg, twice daily.  Keep a diary about behavior (rate anxiety and agitation on a 10 point scale; also record any sedation) and report back to us in 1 week.

## 2019-04-17 NOTE — PROGRESS NOTES
HPI:       83 year old patient who presents with:    Dementia, anxiety, agitation.  Wife and daughter in law are present.  His behavior has become more problematic over the past month.  Seems anxious pervading his days.  Lots of variability from day to day but overall anxiety, flight behaviors have worsened.  He is having trouble recognizing family members.      Wife is very stressed; just contracted zoster.  She admits to being overburdened.    Family would like to continue advance care planning discussion.  They have brought a patially completed POLST form.       Problem, Medication and Allergy Lists were reviewed and are current.  Patient is an established patient of this clinic.         Review of Systems:     Review of Systems   Constitutional: Negative for activity change, appetite change, fever and unexpected weight change.   Respiratory: Negative for shortness of breath.    Cardiovascular: Negative for chest pain.   Musculoskeletal: Negative for gait problem.   Neurological: Negative for weakness.   Psychiatric/Behavioral: Positive for agitation, behavioral problems, confusion, dysphoric mood and sleep disturbance (anxious). The patient is nervous/anxious and is hyperactive.           Physical Exam:     /81   Pulse 104   Wt 68.5 kg (151 lb)   SpO2 96%   BMI 23.15 kg/m      Body mass index is 23.15 kg/m .  Vitals reviewed.    Physical Exam   Constitutional: He appears well-nourished. No distress.   Neck: No JVD present. No thyromegaly present.   Cardiovascular: Normal rate, regular rhythm and intact distal pulses. Exam reveals no friction rub.   Murmur (3/6 CLINT aortic) heard.  Pulmonary/Chest: Effort normal and breath sounds normal. He has no wheezes.   Lymphadenopathy:     He has no cervical adenopathy.   Neurological: He is alert.   Skin: He is not diaphoretic.   Psychiatric:   + memory loss.  Speaks very little.  Alert but unable to follow conversation.   Vitals reviewed.          Results:      Results from last visit:  Office Visit on 03/20/2019   Component Date Value Ref Range Status     Hemoglobin A1C 03/20/2019 6.9* 4.1 - 5.7 % Final     TSH 03/20/2019 1.25  0.40 - 4.00 mU/L Final     WBC 03/20/2019 6.1  4.0 - 11.0 K/uL Final     Lymphocytes # 03/20/2019 1.3  0.8 - 5.3 K/uL Final     % Lymphocytes 03/20/2019 21.2  20.0 - 48.0 %L Final     Mid # 03/20/2019 0.5  0.0 - 2.2 K/uL Final     Mid % 03/20/2019 7.6  0.0 - 20.0 %M Final     GRANULOCYTES # 03/20/2019 4.3  1.6 - 8.3 K/uL Final     % Granulocytes 03/20/2019 71.2  40.0 - 75.0 %G Final     RBC 03/20/2019 3.96* 4.40 - 5.90 M/uL Final     Hemoglobin 03/20/2019 12.1* 13.3 - 17.7 g/dL Final     Hematocrit 03/20/2019 41.7  40.0 - 53.0 % Final     MCV 03/20/2019 105.3* 78.0 - 100.0 fL Final     MCH 03/20/2019 30.6  26.5 - 35.0 pg Final     MCHC 03/20/2019 29.0* 32.0 - 36.0 g/dL Final     Platelets 03/20/2019 216.0  150.0 - 450.0 K/uL Final     CRP Inflammation 03/20/2019 <2.9  0.0 - 8.0 mg/L Final     Sodium 03/20/2019 141  133 - 144 mmol/L Final     Potassium 03/20/2019 4.2  3.4 - 5.3 mmol/L Final     Chloride 03/20/2019 105  94 - 109 mmol/L Final     Carbon Dioxide 03/20/2019 27  20 - 32 mmol/L Final     Anion Gap 03/20/2019 9  3 - 14 mmol/L Final     Glucose 03/20/2019 133* 70 - 99 mg/dL Final     Urea Nitrogen 03/20/2019 17  7 - 30 mg/dL Final     Creatinine 03/20/2019 0.73  0.66 - 1.25 mg/dL Final     GFR Estimate 03/20/2019 86  >60 mL/min/[1.73_m2] Final    Comment: Non  GFR Calc  Starting 12/18/2018, serum creatinine based estimated GFR (eGFR) will be   calculated using the Chronic Kidney Disease Epidemiology Collaboration   (CKD-EPI) equation.       GFR Estimate If Black 03/20/2019 >90  >60 mL/min/[1.73_m2] Final    Comment:  GFR Calc  Starting 12/18/2018, serum creatinine based estimated GFR (eGFR) will be   calculated using the Chronic Kidney Disease Epidemiology Collaboration   (CKD-EPI) equation.       Calcium  03/20/2019 9.8  8.5 - 10.1 mg/dL Final     Bilirubin Total 03/20/2019 0.3  0.2 - 1.3 mg/dL Final     Albumin 03/20/2019 3.9  3.4 - 5.0 g/dL Final     Protein Total 03/20/2019 7.3  6.8 - 8.8 g/dL Final     Alkaline Phosphatase 03/20/2019 72  40 - 150 U/L Final     ALT 03/20/2019 30  0 - 70 U/L Final     AST 03/20/2019 20  0 - 45 U/L Final     Sed Rate 03/20/2019 10  0 - 20 mm/h Final     Assessment and Plan     Jaydon Romero was seen today for recheck.    Diagnoses and all orders for this visit:    Late onset Alzheimer's disease with behavioral disturbance.  Long discussion with wife (POA) and daughter in law regarding options of increasing home services, respite care, and pharmacologic treatment of anxiety/agitation.  Family has tried numerous behavioral interventions and wife is overburdened.  He has been on 2 different anti anxiety meds and is still very symptomatic.  I offered a cautious trial of antipsychotic and after significant discussion the family agreed.  Will start risperidone at lowest dose (side effects and risks discussed extensively with family) and reassess weekly.  -     risperiDONE (RISPERDAL) 0.25 MG tablet; Take 1 tablet (0.25 mg) by mouth 2 times daily    Advance care planning.  25 minutes spent discussing ACP and completing a POLST form.  Family is in agreement that he would elect comfort care at this point.  POLST completed and scanned into record.  -     Comfort care  -     DNR/DNI        Options for treatment and follow-up care were reviewed with the patient. Jaydon Romero Nick Alegria engaged in the decision making process and verbalized understanding of the options discussed and agreed with the final plan.    Steffen Bingham MD

## 2019-04-26 ENCOUNTER — TELEPHONE (OUTPATIENT)
Dept: PHARMACY | Facility: CLINIC | Age: 84
End: 2019-04-26

## 2019-04-26 DIAGNOSIS — F02.818 LATE ONSET ALZHEIMER'S DISEASE WITH BEHAVIORAL DISTURBANCE (H): ICD-10-CM

## 2019-04-26 DIAGNOSIS — G30.1 LATE ONSET ALZHEIMER'S DISEASE WITH BEHAVIORAL DISTURBANCE (H): ICD-10-CM

## 2019-04-26 RX ORDER — RISPERIDONE 0.25 MG/1
0.25 TABLET ORAL 2 TIMES DAILY
Qty: 60 TABLET | Refills: 1 | Status: SHIPPED | OUTPATIENT
Start: 2019-04-26 | End: 2019-05-07

## 2019-04-26 NOTE — TELEPHONE ENCOUNTER
PHARMACY TELEPHONE ENCOUNTER:    Reason: Risperidone requires authorization      I have been instructed by the pharmacy to call [employer].  The company requires a PA process for this medication before approval.    I have answered their questions regarding Dx and indication for the medication.    I have requested this to be placed in the urgent category.   The insurer will review the request and respond in one day.    Latricia GhoshD.

## 2019-04-30 ENCOUNTER — OFFICE VISIT (OUTPATIENT)
Dept: PODIATRY | Facility: CLINIC | Age: 84
End: 2019-04-30
Payer: MEDICARE

## 2019-04-30 ENCOUNTER — DOCUMENTATION ONLY (OUTPATIENT)
Dept: OTHER | Facility: CLINIC | Age: 84
End: 2019-04-30

## 2019-04-30 VITALS — SYSTOLIC BLOOD PRESSURE: 128 MMHG | HEART RATE: 88 BPM | DIASTOLIC BLOOD PRESSURE: 69 MMHG | OXYGEN SATURATION: 98 %

## 2019-04-30 DIAGNOSIS — E11.49 TYPE II OR UNSPECIFIED TYPE DIABETES MELLITUS WITH NEUROLOGICAL MANIFESTATIONS, NOT STATED AS UNCONTROLLED(250.60) (H): ICD-10-CM

## 2019-04-30 DIAGNOSIS — B35.1 ONYCHOMYCOSIS: Primary | ICD-10-CM

## 2019-04-30 PROCEDURE — 99213 OFFICE O/P EST LOW 20 MIN: CPT | Performed by: PODIATRIST

## 2019-04-30 NOTE — LETTER
4/30/2019         RE: Jaydon Alegria  [address] [facility]   [address]  [place] [address]        Dear Colleague,    Thank you for referring your patient, Jaydon Alegria, to the [facility]. Please see a copy of my visit note below.    Past Medical History:   Diagnosis Date     Depressive disorder      Diabetes (H)      Hypertension      Skin cancer 8/25/2016     Patient Active Problem List   Diagnosis     Essential hypertension     Peripheral neuropathy     HL (hearing loss), bilateral     Glaucoma of both eyes     Hyperlipidemia LDL goal <130     Chronic constipation     Late onset Alzheimer's disease without behavioral disturbance     Type 2 diabetes mellitus without complication (H)     Aortic stenosis     Benign essential hypertension     Type 2 diabetes mellitus without complication, without long-term current use of insulin (H)     Cerebrovascular disease     Infective otitis externa, right     Past Surgical History:   Procedure Laterality Date     CHOLECYSTECTOMY       EXCISE LESION EYELID Right 4/26/2016    Procedure: EXCISE LESION EYELID;  Surgeon: Abelino Pichardo MD;  Location: [facility]     HEAD & NECK SURGERY       ORTHOPEDIC SURGERY      shoulder right      Social History     Socioeconomic History     Marital status:      Spouse name: Not on file     Number of children: Not on file     Years of education: Not on file     Highest education level: Not on file   Occupational History     Not on file   Social Needs     Financial resource strain: Not on file     Food insecurity:     Worry: Not on file     Inability: Not on file     Transportation needs:     Medical: Not on file     Non-medical: Not on file   Tobacco Use     Smoking status: Former Smoker     Smokeless tobacco: Never Used   Substance and Sexual Activity     Alcohol use: Yes     Comment: occasionally     Drug use: No     Sexual activity: Not Currently   Lifestyle      Physical activity:     Days per week: Not on file     Minutes per session: Not on file     Stress: Not on file   Relationships     Social connections:     Talks on phone: Not on file     Gets together: Not on file     Attends Taoism service: Not on file     Active member of club or organization: Not on file     Attends meetings of clubs or organizations: Not on file     Relationship status: Not on file     Intimate partner violence:     Fear of current or ex partner: Not on file     Emotionally abused: Not on file     Physically abused: Not on file     Forced sexual activity: Not on file   Other Topics Concern     Not on file   Social History Narrative     Not on file     Family History   Problem Relation Age of Onset     Other Cancer Sister      Diabetes No family hx of      Coronary Artery Disease No family hx of      Hypertension No family hx of      Hyperlipidemia No family hx of      Cerebrovascular Disease No family hx of      Breast Cancer No family hx of      Colon Cancer No family hx of      Prostate Cancer No family hx of      Depression No family hx of      Anxiety Disorder No family hx of      Substance Abuse No family hx of      Mental Illness No family hx of      Lab Results   Component Value Date    A1C 6.9 03/20/2019    A1C 7.0 09/12/2018    A1C 6.5 03/07/2018    A1C 7.0 10/11/2017    A1C 7.5 07/19/2017   SUBJECTIVE FINDINGS:  83-year-old male returns to clinic for onychomycosis bilaterally.  He is diabetic with peripheral neuropathy, plantar wart left foot, tinea pedis.  Relates they are using the Loprox cream intermittently, no ulcers or sores since we have seen him last, not sure if he has numbness, tingling, peripheral neuropathy on his feet.       OBJECTIVE FINDINGS:  DP and PT are 1/4 bilaterally.  He has decreased hair growth bilaterally.  He has some peripheral edema with venous stasis bilaterally.  There is no erythema, no drainage, no odor, no calor bilaterally.  He has dystrophic,  thickened, brittle nails with subungual debris 1-5 bilaterally to differing degrees.  He has mild dry scaly skin in a moccasin pattern on the right foot.      ASSESSMENT AND PLAN:  Onychomycosis bilaterally, diabetes with peripheral neuropathy, tinea pedis that is improved, plantar wart appear to be nearly resolved.  Diagnosis and treatment discussed with him.  He is diabetic with peripheral neuropathy and vascular disease and onychomycosis.  All the nails 1-5 bilaterally were debrided upon consent.  Continue the Loprox cream.  Return to clinic to see me in 2-3 months.         Again, thank you for allowing me to participate in the care of your patient.        Sincerely,        Garfield Fisher DPM

## 2019-04-30 NOTE — NURSING NOTE
Jaydon Alegria's chief complaint for this visit includes:  Chief Complaint   Patient presents with     RECHECK     Toenail trim     PCP: Steffen Bingham    Referring Provider:  Steffen Bingham MD  [address]  [place] [address]    /69   Pulse 88   SpO2 98%   Data Unavailable     Do you need any medication refills at today's visit? no

## 2019-04-30 NOTE — PROGRESS NOTES
Past Medical History:   Diagnosis Date     Depressive disorder      Diabetes (H)      Hypertension      Skin cancer 8/25/2016     Patient Active Problem List   Diagnosis     Essential hypertension     Peripheral neuropathy     HL (hearing loss), bilateral     Glaucoma of both eyes     Hyperlipidemia LDL goal <130     Chronic constipation     Late onset Alzheimer's disease without behavioral disturbance     Type 2 diabetes mellitus without complication (H)     Aortic stenosis     Benign essential hypertension     Type 2 diabetes mellitus without complication, without long-term current use of insulin (H)     Cerebrovascular disease     Infective otitis externa, right     Past Surgical History:   Procedure Laterality Date     CHOLECYSTECTOMY       EXCISE LESION EYELID Right 4/26/2016    Procedure: EXCISE LESION EYELID;  Surgeon: Abelino Pichardo MD;  Location: [facility]     HEAD & NECK SURGERY       ORTHOPEDIC SURGERY      shoulder right      Social History     Socioeconomic History     Marital status:      Spouse name: Not on file     Number of children: Not on file     Years of education: Not on file     Highest education level: Not on file   Occupational History     Not on file   Social Needs     Financial resource strain: Not on file     Food insecurity:     Worry: Not on file     Inability: Not on file     Transportation needs:     Medical: Not on file     Non-medical: Not on file   Tobacco Use     Smoking status: Former Smoker     Smokeless tobacco: Never Used   Substance and Sexual Activity     Alcohol use: Yes     Comment: occasionally     Drug use: No     Sexual activity: Not Currently   Lifestyle     Physical activity:     Days per week: Not on file     Minutes per session: Not on file     Stress: Not on file   Relationships     Social connections:     Talks on phone: Not on file     Gets together: Not on file     Attends Rastafari service: Not on file     Active member of club or organization: Not on  file     Attends meetings of clubs or organizations: Not on file     Relationship status: Not on file     Intimate partner violence:     Fear of current or ex partner: Not on file     Emotionally abused: Not on file     Physically abused: Not on file     Forced sexual activity: Not on file   Other Topics Concern     Not on file   Social History Narrative     Not on file     Family History   Problem Relation Age of Onset     Other Cancer Sister      Diabetes No family hx of      Coronary Artery Disease No family hx of      Hypertension No family hx of      Hyperlipidemia No family hx of      Cerebrovascular Disease No family hx of      Breast Cancer No family hx of      Colon Cancer No family hx of      Prostate Cancer No family hx of      Depression No family hx of      Anxiety Disorder No family hx of      Substance Abuse No family hx of      Mental Illness No family hx of      Lab Results   Component Value Date    A1C 6.9 03/20/2019    A1C 7.0 09/12/2018    A1C 6.5 03/07/2018    A1C 7.0 10/11/2017    A1C 7.5 07/19/2017   SUBJECTIVE FINDINGS:  83-year-old male returns to clinic for onychomycosis bilaterally.  He is diabetic with peripheral neuropathy, plantar wart left foot, tinea pedis.  Relates they are using the Loprox cream intermittently, no ulcers or sores since we have seen him last, not sure if he has numbness, tingling, peripheral neuropathy on his feet.       OBJECTIVE FINDINGS:  DP and PT are 1/4 bilaterally.  He has decreased hair growth bilaterally.  He has some peripheral edema with venous stasis bilaterally.  There is no erythema, no drainage, no odor, no calor bilaterally.  He has dystrophic, thickened, brittle nails with subungual debris 1-5 bilaterally to differing degrees.  He has mild dry scaly skin in a moccasin pattern on the right foot.      ASSESSMENT AND PLAN:  Onychomycosis bilaterally, diabetes with peripheral neuropathy, tinea pedis that is improved, plantar wart appear to be nearly  resolved.  Diagnosis and treatment discussed with him.  He is diabetic with peripheral neuropathy and vascular disease and onychomycosis.  All the nails 1-5 bilaterally were debrided upon consent.  Continue the Loprox cream.  Return to clinic to see me in 2-3 months.

## 2019-04-30 NOTE — PATIENT INSTRUCTIONS
Thanks for coming today.  Ortho[facility] Medicine Clinic  [address] [place] [address]    To schedule future appointments in Ortho Clinic, you may call [phone].    To schedule ordered imaging by your Provider: Call [facility] at [phone]    [employer] available online at:   [email or web address]    Please call if any further questions or concerns [phone] and ask for the Orthopedic Department. Clinic hours 8 am to 5 pm.    Return to clinic if symptoms worsen.

## 2019-05-07 ENCOUNTER — MYC REFILL (OUTPATIENT)
Dept: FAMILY MEDICINE | Facility: CLINIC | Age: 84
End: 2019-05-07

## 2019-05-07 DIAGNOSIS — F02.818 LATE ONSET ALZHEIMER'S DISEASE WITH BEHAVIORAL DISTURBANCE (H): ICD-10-CM

## 2019-05-07 DIAGNOSIS — G30.1 LATE ONSET ALZHEIMER'S DISEASE WITH BEHAVIORAL DISTURBANCE (H): ICD-10-CM

## 2019-05-07 DIAGNOSIS — I10 ESSENTIAL HYPERTENSION: ICD-10-CM

## 2019-05-07 RX ORDER — RISPERIDONE 0.25 MG/1
TABLET ORAL
Qty: 60 TABLET | Refills: 1 | Status: SHIPPED | OUTPATIENT
Start: 2019-05-07

## 2019-05-07 RX ORDER — AMLODIPINE BESYLATE 10 MG/1
10 TABLET ORAL DAILY
Qty: 90 TABLET | Refills: 3 | Status: SHIPPED | OUTPATIENT
Start: 2019-05-07 | End: 2019-05-29

## 2019-05-07 NOTE — TELEPHONE ENCOUNTER
"Request for medication refill:    Insurance will not cover    Providers if patient needs an appointment and you are willing to give a one month supply please refill for one month and  send a letter/MyChart using \".SMILLIMITEDREFILL\" .smillimited and route chart to \"P SMI \" (Giving one month refill in non controlled medications is strongly recommended before denial)    If refill has been denied, meaning absolutely no refills without visit, please complete the smart phrase \".smirxrefuse\" and route it to the \"P SMI MED REFILLS\"  pool to inform the patient and the pharmacy.    Marianna Loja, [facility]        "

## 2019-05-07 NOTE — TELEPHONE ENCOUNTER

## 2019-05-29 ENCOUNTER — OFFICE VISIT (OUTPATIENT)
Dept: FAMILY MEDICINE | Facility: CLINIC | Age: 84
End: 2019-05-29
Payer: MEDICARE

## 2019-05-29 VITALS
BODY MASS INDEX: 23 KG/M2 | HEART RATE: 72 BPM | DIASTOLIC BLOOD PRESSURE: 66 MMHG | SYSTOLIC BLOOD PRESSURE: 112 MMHG | WEIGHT: 150 LBS | RESPIRATION RATE: 16 BRPM | OXYGEN SATURATION: 98 %

## 2019-05-29 DIAGNOSIS — G30.1 LATE ONSET ALZHEIMER'S DISEASE WITH BEHAVIORAL DISTURBANCE (H): Primary | ICD-10-CM

## 2019-05-29 DIAGNOSIS — E11.9 TYPE 2 DIABETES MELLITUS WITHOUT COMPLICATION, WITHOUT LONG-TERM CURRENT USE OF INSULIN (H): ICD-10-CM

## 2019-05-29 DIAGNOSIS — I10 ESSENTIAL HYPERTENSION: ICD-10-CM

## 2019-05-29 DIAGNOSIS — F02.818 LATE ONSET ALZHEIMER'S DISEASE WITH BEHAVIORAL DISTURBANCE (H): Primary | ICD-10-CM

## 2019-05-29 ASSESSMENT — ENCOUNTER SYMPTOMS
NERVOUS/ANXIOUS: 1
HYPERACTIVE: 1
AGITATION: 1
CONFUSION: 1
UNEXPECTED WEIGHT CHANGE: 0
FEVER: 0
DYSPHORIC MOOD: 0
ACTIVITY CHANGE: 0
SLEEP DISTURBANCE: 1
APPETITE CHANGE: 0

## 2019-05-29 NOTE — PATIENT INSTRUCTIONS
Med Changes:    Stop amlodipine, clopidogrel, and memantine.    Change metformin to 1,000 mg daily.

## 2019-05-30 ENCOUNTER — TELEPHONE (OUTPATIENT)
Dept: FAMILY MEDICINE | Facility: CLINIC | Age: 84
End: 2019-05-30

## 2019-05-30 NOTE — TELEPHONE ENCOUNTER
"[facility] Family Medicine phone call message- patient requesting form status: Jennifer requesting a call back in regards 2 pages form \"Physicians order for admission\" at [facility]. The forms have been faxed on May 28th and is waiting to hear back. She is requesting the forms to send by tomorrow.    Type of Form: 2 Pages physicians orders for admission at [facility].     Given to: Call back    Submitted: Faxed on May 28th, 2019     Date Submitted: Faxed    Date Needed by: 5/31/2019    Additional Details: Requesting a call back in regards to the forms.    Advised Patient it may take up to 7 - 10 business days: Yes    OK to leave a message on voice mail? Yes    Primary language: English      needed? No    Call taken on May 30, 2019 at 12:54 PM by Andreea Felipe    Route to [facility] (color team) [facility]CS  "

## 2019-05-30 NOTE — TELEPHONE ENCOUNTER
Patient's wife called stating that this form needs to be completed by tomorrow for the patient to be able to go to a nursing home on Monday. Please call patient's wife back to discuss, okay to LVM.    Geno Bynum, Patient Representative

## 2019-05-30 NOTE — TELEPHONE ENCOUNTER
Dr. Conteh assisted by filling out the form based on DAVION Bingham's office note from yesterday. RN faxed to facility successfully, notified ANALILIA Rodriguez and wife of this being completed.  Shahnaz Monroe RN

## 2019-06-11 ENCOUNTER — DOCUMENTATION ONLY (OUTPATIENT)
Dept: FAMILY MEDICINE | Facility: CLINIC | Age: 84
End: 2019-06-11

## 2019-06-11 NOTE — PROGRESS NOTES
"When opening a documentation only encounter, be sure to enter in \"Chief Complaint\" Forms and in \" Comments\" Title of form, description if needed.    Jaydon Romero is a 84 year old  male  Form received via: Fax  Form now resides in: Provider Ready    Marianna Loja CMA                Form has been completed by provider.     Form sent out via: Fax to [phone]  Patient informed: No, Reason: fax confirmed  Output date: June 13, 2019    Marianna Loja CMA      **Please close the encounter**      "

## 2019-07-17 ENCOUNTER — OFFICE VISIT (OUTPATIENT)
Dept: FAMILY MEDICINE | Facility: CLINIC | Age: 84
End: 2019-07-17
Payer: MEDICARE

## 2019-07-17 VITALS
OXYGEN SATURATION: 98 % | WEIGHT: 143 LBS | SYSTOLIC BLOOD PRESSURE: 144 MMHG | BODY MASS INDEX: 21.93 KG/M2 | DIASTOLIC BLOOD PRESSURE: 87 MMHG | HEART RATE: 75 BPM

## 2019-07-17 DIAGNOSIS — G30.1 LATE ONSET ALZHEIMER'S DISEASE WITHOUT BEHAVIORAL DISTURBANCE (H): Primary | ICD-10-CM

## 2019-07-17 DIAGNOSIS — F02.80 LATE ONSET ALZHEIMER'S DISEASE WITHOUT BEHAVIORAL DISTURBANCE (H): Primary | ICD-10-CM

## 2019-07-17 DIAGNOSIS — E11.9 TYPE 2 DIABETES MELLITUS WITHOUT COMPLICATION, WITHOUT LONG-TERM CURRENT USE OF INSULIN (H): ICD-10-CM

## 2019-07-17 DIAGNOSIS — I10 ESSENTIAL HYPERTENSION: ICD-10-CM

## 2019-07-17 ASSESSMENT — ENCOUNTER SYMPTOMS
FEVER: 0
AGITATION: 0
DYSPHORIC MOOD: 0
SLEEP DISTURBANCE: 1
ACTIVITY CHANGE: 0
JOINT SWELLING: 0
NERVOUS/ANXIOUS: 0
UNEXPECTED WEIGHT CHANGE: 0
APNEA: 0
HYPERACTIVE: 0
APPETITE CHANGE: 0
CHOKING: 0
CONFUSION: 1

## 2019-07-17 NOTE — PROGRESS NOTES
HPI:       84 year old patient who presents with:    Follow-up of Alzheimer's disease with behavioral disturbance.  He has made the transition to a memory care unit without incident and in fact is doing well.  He seems relaxed according to both the family and the staff.  There is no excessive sleepiness during the day for most days and he has had no behavioral outbursts.  The family is grateful for this transition and for the lessening of the caregiver burden on the patient's wife.    The patient himself has no complaints.  His wife states that his language skills are continuing to decline.  She cannot understand him when he speaks to her in Cypriot most of the time.  He is speaking less and less overall.  Mobility is still intact and he has had no falls.           Problem, Medication and Allergy Lists were reviewed and are current.  Patient is an established patient of this clinic.         Review of Systems:     Review of Systems   Constitutional: Negative for activity change, appetite change, fever and unexpected weight change.   Respiratory: Negative for apnea and choking.    Cardiovascular: Negative for leg swelling.   Musculoskeletal: Negative for gait problem and joint swelling.   Psychiatric/Behavioral: Positive for confusion and sleep disturbance (anxious). Negative for agitation, behavioral problems and dysphoric mood. The patient is not nervous/anxious and is not hyperactive.           Physical Exam:     /87   Pulse 75   Wt 64.9 kg (143 lb)   SpO2 98%   BMI 21.93 kg/m      Body mass index is 21.93 kg/m .  Vitals reviewed.    Physical Exam   Constitutional: He appears well-nourished. No distress.   Neck: No JVD present. No thyromegaly present.   Cardiovascular: Normal rate, regular rhythm and intact distal pulses. Exam reveals no friction rub.   Murmur (3/6 CLINT aortic) heard.  Pulmonary/Chest: Effort normal and breath sounds normal. He has no wheezes.   Lymphadenopathy:     He has no  cervical adenopathy.   Neurological: He is alert.   Skin: He is not diaphoretic.   Psychiatric:   Speaks very little.  Alert but unable to follow conversation.   Vitals reviewed.          Results:     Results from last visit:  Office Visit on 03/20/2019   Component Date Value Ref Range Status     Hemoglobin A1C 03/20/2019 6.9* 4.1 - 5.7 % Final     TSH 03/20/2019 1.25  0.40 - 4.00 mU/L Final     WBC 03/20/2019 6.1  4.0 - 11.0 K/uL Final     Lymphocytes # 03/20/2019 1.3  0.8 - 5.3 K/uL Final     % Lymphocytes 03/20/2019 21.2  20.0 - 48.0 %L Final     Mid # 03/20/2019 0.5  0.0 - 2.2 K/uL Final     Mid % 03/20/2019 7.6  0.0 - 20.0 %M Final     GRANULOCYTES # 03/20/2019 4.3  1.6 - 8.3 K/uL Final     % Granulocytes 03/20/2019 71.2  40.0 - 75.0 %G Final     RBC 03/20/2019 3.96* 4.40 - 5.90 M/uL Final     Hemoglobin 03/20/2019 12.1* 13.3 - 17.7 g/dL Final     Hematocrit 03/20/2019 41.7  40.0 - 53.0 % Final     MCV 03/20/2019 105.3* 78.0 - 100.0 fL Final     MCH 03/20/2019 30.6  26.5 - 35.0 pg Final     MCHC 03/20/2019 29.0* 32.0 - 36.0 g/dL Final     Platelets 03/20/2019 216.0  150.0 - 450.0 K/uL Final     CRP Inflammation 03/20/2019 <2.9  0.0 - 8.0 mg/L Final     Sodium 03/20/2019 141  133 - 144 mmol/L Final     Potassium 03/20/2019 4.2  3.4 - 5.3 mmol/L Final     Chloride 03/20/2019 105  94 - 109 mmol/L Final     Carbon Dioxide 03/20/2019 27  20 - 32 mmol/L Final     Anion Gap 03/20/2019 9  3 - 14 mmol/L Final     Glucose 03/20/2019 133* 70 - 99 mg/dL Final     Urea Nitrogen 03/20/2019 17  7 - 30 mg/dL Final     Creatinine 03/20/2019 0.73  0.66 - 1.25 mg/dL Final     GFR Estimate 03/20/2019 86  >60 mL/min/[1.73_m2] Final    Comment: Non  GFR Calc  Starting 12/18/2018, serum creatinine based estimated GFR (eGFR) will be   calculated using the Chronic Kidney Disease Epidemiology Collaboration   (CKD-EPI) equation.       GFR Estimate If Black 03/20/2019 >90  >60 mL/min/[1.73_m2] Final    Comment:   American GFR Calc  Starting 12/18/2018, serum creatinine based estimated GFR (eGFR) will be   calculated using the Chronic Kidney Disease Epidemiology Collaboration   (CKD-EPI) equation.       Calcium 03/20/2019 9.8  8.5 - 10.1 mg/dL Final     Bilirubin Total 03/20/2019 0.3  0.2 - 1.3 mg/dL Final     Albumin 03/20/2019 3.9  3.4 - 5.0 g/dL Final     Protein Total 03/20/2019 7.3  6.8 - 8.8 g/dL Final     Alkaline Phosphatase 03/20/2019 72  40 - 150 U/L Final     ALT 03/20/2019 30  0 - 70 U/L Final     AST 03/20/2019 20  0 - 45 U/L Final     Sed Rate 03/20/2019 10  0 - 20 mm/h Final     Assessment and Plan     Jaydon Romero was seen today for recheck.    Diagnoses and all orders for this visit:    Late onset Alzheimer's disease without behavioral disturbance.  He is transitioning to late stage dementia and is currently at a fast stage VI level.  Fortunately he has made a smooth transition to a memory care unit with a lessening of the caregiver burden on his wife.  We will continue with a comfort care/palliative care approach, using medications to prevent any acute flareups from his chronic illnesses.  His dementia is progressing but he would not yet be indicated for hospice.  This was discussed with his family and they are in agreement.  He will continue on risperidone at the current dose for his behavioral problems that he has had recently and are under good control now.  We will remain vigilant for oversedation and will adjust risperidone dosage if that occurs.    Type 2 diabetes mellitus without complication, without long-term current use of insulin (H).  He will remain on metformin for this.    Essential hypertension.  Continue losartan.        Options for treatment and follow-up care were reviewed with the patient. Jaydon Nick Alegria engaged in the decision making process and verbalized understanding of the options discussed and agreed with the final plan.    Steffen Bingham MD

## 2019-07-31 ENCOUNTER — MEDICAL CORRESPONDENCE (OUTPATIENT)
Dept: HEALTH INFORMATION MANAGEMENT | Facility: CLINIC | Age: 84
End: 2019-07-31

## 2019-08-01 ENCOUNTER — DOCUMENTATION ONLY (OUTPATIENT)
Dept: FAMILY MEDICINE | Facility: CLINIC | Age: 84
End: 2019-08-01

## 2019-08-01 NOTE — PROGRESS NOTES
Form has been completed by provider.     Form sent out via: Fax to [phone]  Patient informed: No, Reason: fax confirmed  Output date: August 1, 2019    Marianna Loja CMA      **Please close the encounter**

## 2019-09-12 NOTE — TELEPHONE ENCOUNTER
Prior Authorization Retail Medication Request    Medication/Dose: timolol hemihydrate (BETIMOL) 0.5 % SOLN ophthalmic solution  ICD code (if different than what is on RX):  Glaucoma.  H40.9 is the code.       Previously Tried and Failed:  See chart  Rationale:  See chart    Insurance Name:  medicare  Insurance ID:  [id number]       Pharmacy Information (if different than what is on RX)  [facility]  Phone:  [phone]

## 2019-09-17 NOTE — PROGRESS NOTES
"When opening a documentation only encounter, be sure to enter in \"Chief Complaint\" Forms and in \" Comments\" Title of form, description if needed.    Jaydon Romero is a 84 year old  male  Form received via: Fax  Form now resides in: Provider Ready    Marianna Loja, [facility]                  "

## 2019-09-17 NOTE — TELEPHONE ENCOUNTER
Prior Authorization Not Needed per Insurance    Medication: timolol hemihydrate (BETIMOL) 0.5 % SOLN ophthalmic solution-PA NOT NEEDED   Insurance Company: [employer] - Phone [phone] Fax [phone]  Expected CoPay: $4.45    Pharmacy Filling the Rx: [facility] - [place], [place] - [address] [place] [address]  Pharmacy Notified: Yes  Patient Notified: No    Insurance stated that PA is Not Needed and medication is covered. Insurance stated that they show a paid claim on medication quantity 5 ml for 28 day supply and next available fill date is 9/21/2019.

## 2019-09-18 NOTE — TELEPHONE ENCOUNTER
[facility] Family Medicine phone call message - order or referral request from patient:     Order or referral being requested: Requested order Order to crush medications as well as Physical Occupational Therapy for trouble walking/gait and possible wheelchair.    Additional Details:     Referral only -Specialty  Location     OK to leave a message on voice mail? Yes    Primary language: English      needed? No    Call taken on September 18, 2019 at 9:41 AM by Geno Bynum    Order request route to [facility] TRIAGE   Referrals Route to [facility] (Green/[place]/Purple) CARE COORDINATOR

## 2019-09-18 NOTE — TELEPHONE ENCOUNTER
Returned call to home care nurse, unable to reach. Left VM with verbal orders per protocol as requested. Left callback number.     RN inquired on voicemail names of all of the medications they would like to crush- need to ensure that all of them are ones we prescribe (ours would be fine to crush as they are not ER or XR medications). If any they are requesting are written by another physician and are missing from our medication list, I need to ensure that they are not extended release medications.    When they call back, please obtain all medications they are asking about.    Shahnaz Monroe, ANALILIA

## 2019-09-18 NOTE — TELEPHONE ENCOUNTER
Medications that home care nurse is requiring are     metFORMIN (GLUCOPHAGE) 1000 MG tablet    risperiDONE (RISPERDAL) 0.25 MG tablet    losartan (COZAAR) 100 MG tablet

## 2019-09-18 NOTE — TELEPHONE ENCOUNTER
RN spoke with home care nurse and they have called the pharmacist at the patient's pharmacy who has given the okay to crush. RN gave a verbal from our office that it is also okay to crush as none of these are ER, XR or enteric coated (EC). They will be sending a form for us to sign and fax back.    Routing to PCP as an FYI.  Shahnaz Monroe RN

## 2019-09-23 NOTE — PROGRESS NOTES
Form has been completed by provider.     Form sent out via: Fax to [phone]  Patient informed: No, Reason: fax confirmed  Output date: September 23, 2019    Marianna Loja CMA      **Please close the encounter**

## 2019-09-24 NOTE — PROGRESS NOTES
Form has been completed by provider.     Form sent out via: Fax to [phone]  Patient informed: No, Reason: fax confirmed  Output date: September 24, 2019    Marianna Loja CMA      **Please close the encounter**

## 2019-10-01 NOTE — TELEPHONE ENCOUNTER
[facility] Family Medicine phone call message- patient requesting to speak directly with PCP or provider.    PCP: Steffen Bingham    Reason for Call: [facility] RN calling to speak with Dr. Bingham regarding patients hospice care. RN Phoebe stated she completed a hospice consult today with the patient to review signs of decline and has determined that the patient does not meet hospice criteria at the moment. She will continue to visit the patient to monitor if this changes but at the time being, he does not meet the criteria.     Additional Details:     Are you willing to speak with a nurse? Yes    Is a call back needed? Yes    Patient informed that it may take up to 2 business days to hear back from PCP:Yes    OK to leave a message on voice mail? Yes    Primary language: English      needed? No    Call taken on October 1, 2019 at 2:21 PM by Valerie Latham route to PCP unless willing to speak with a nurse.

## 2019-10-01 NOTE — TELEPHONE ENCOUNTER
[facility] Family Medicine phone call message - order or referral request from patient:     Order or referral being requested: Requested order DME    Additional Details: [facility] RN requesting a order for a wheel chair and electric hospital bed. Per Phoebe, patient has a difficult time with ambulation and leaving the facility therefore is requesting an order be placed without an office visit. RN stated she is able to completed the required WC assessment at the facility.        Referral only -Specialty  Location     OK to leave a message on voice mail? Yes    Primary language: English      needed? No    Call taken on October 1, 2019 at 2:28 PM by Valerie Terrell    Order request route to [facility] TRIAGE   Referrals Route to [facility] (Green/[place]/Purple) CARE COORDINATOR

## 2019-10-01 NOTE — TELEPHONE ENCOUNTER
Routing to PCP to please advise ASAP as patient has visit scheduled for tomorrow and needs to know if he can cancel it.  Shahnaz Monroe RN

## 2019-10-02 NOTE — TELEPHONE ENCOUNTER
PCP provided the orders for the wheelchair and hospital bed. RN reached Phoebe who encouraged me to contact [facility] and speak to one of their nurses. RN contacted their facility and no one answered, left message on the nurse line informing her that I have orders for a wheelchair and a hospital bed and requested the location for where I should fax it, wether it should be to them or if they have a dedicated DME company that they work with.     When they call back, please find out the number where they would like it faxed and then route back to RN.  Shahnaz Monroe RN

## 2019-10-03 NOTE — TELEPHONE ENCOUNTER
RN contacted Divya to find out more information as to who requested she come out as we had just received a call 10/1/19 that  home care evaluated patient for hospice and they did not qualify. Divya stated that Janee called her and requested her to come out. RN contacted Janee to find out if it was intentional that they are requesting another hospice evaluation for a second opinion or if it was requested at the same time as  and is a duplicate. It was intentional because they would like to see if patient qualifies through another agency.    RN then contacted Divya and gave verbal orders per protocol, verbalized understanding.  Shahnaz Monroe RN

## 2019-10-03 NOTE — TELEPHONE ENCOUNTER
[facility] Family Medicine phone call message - order or referral request from patient:     Order or referral being requested: Requested order Hospice order for Eval & Treat     Additional Details: Patient is going to be seen today, please call with orders asap.    Referral only -Specialty  Location     OK to leave a message on voice mail? Yes    Primary language: English      needed? No    Call taken on October 3, 2019 at 9:39 AM by Geno Bynum    Order request route to [facility] TRIAGE   Referrals Route to [facility] (Green/[place]/Purple) CARE COORDINATOR

## 2019-10-09 NOTE — PROGRESS NOTES
"When opening a documentation only encounter, be sure to enter in \"Chief Complaint\" Forms and in \" Comments\" Title of form, description if needed.    Jaydon Romero is a 84 year old  male  Form received via: Fax  Form now resides in: Provider Ready    Marianna Loja CMA                Form has been completed by provider.     Form sent out via: Fax to [phone]  Patient informed: No, Reason: fax confirmed  Output date: October 17, 2019    Marianna Loja CMA      **Please close the encounter**      "

## 2019-10-10 NOTE — TELEPHONE ENCOUNTER

## 2019-10-10 NOTE — TELEPHONE ENCOUNTER
Jennifer called back and they do not need these orders as he now qualified for hospice.  Shahnaz Monroe RN

## 2019-10-10 NOTE — TELEPHONE ENCOUNTER
RN made second attempt to call [employer] and left message for nursing requesting them to call back with designated fax number or company where they want the orders sent. Orders remain above my desk below the outgoing fax bin if they call back with fax number. Closing out encounter until they contact us. If they call back please obtain fax.  Shahnaz Monroe RN

## 2019-10-24 NOTE — PROGRESS NOTES
Form has been completed by provider.     Form sent out via: Fax to [phone]  Patient informed: No, Reason: fax confirmed  Output date: October 24, 2019    Marianna Loja CMA      **Please close the encounter**

## 2020-01-01 ENCOUNTER — DOCUMENTATION ONLY (OUTPATIENT)
Dept: FAMILY MEDICINE | Facility: CLINIC | Age: 85
End: 2020-01-01

## 2020-01-01 DIAGNOSIS — E11.9 TYPE 2 DIABETES MELLITUS WITHOUT COMPLICATION, WITHOUT LONG-TERM CURRENT USE OF INSULIN (H): ICD-10-CM

## 2020-01-01 DIAGNOSIS — B35.1 DERMATOPHYTOSIS OF NAIL: ICD-10-CM

## 2020-01-01 RX ORDER — CICLOPIROX OLAMINE 7.7 MG/G
CREAM TOPICAL 2 TIMES DAILY
Qty: 30 G | Refills: 3 | Status: SHIPPED | OUTPATIENT
Start: 2020-01-01

## 2020-01-13 NOTE — ADDENDUM NOTE
Encounter addended by: Evelina Myrick PT on: 1/13/2020 9:31 AM   Actions taken: Clinical Note Signed, Episode resolved

## 2020-01-13 NOTE — PROGRESS NOTES
Outpatient Physical Therapy Discharge Note     Patient: Jaydon Alegria  : 1935    Beginning/End Dates of Reporting Period:  19 to 2020    Referring Provider: Dr. Tnoo Archibald    Therapy Diagnosis: decreased functional mobility, fall risk, cognitive impairment     Client Self Report: see eval    Goals:  Goal Identifier 6MWT   Goal Description 1. Pt will complete 6MWT with AD as needed safely and independently to demonsrate improvement from evaluation where assessment was terminated for safety.   Target Date 19   Date Met      Progress:     Goal Identifier NARAYAN   Goal Description 2. Patient will score greater than 45/56 on the Narayan balance assessment in order to decrease fall risk.   Target Date 19   Date Met      Progress:     Goal Identifier AD   Goal Description 3. Pt will safely ambulate with 4WW outdoors and in the community to decrease fall riske.   Target Date 19   Date Met      Progress:     GOALS NOT MET    Progress Toward Goals:   Progress limited due to pt seen for evaluation session only. Wife agreeable to continue with ongoing therapy, pt was not. Education on bathroom equipment and gait aid, pt not interested. Barriers include impaired cognition.    Plan:  Discharge from therapy.    Discharge:    Reason for Discharge: Patient has failed to schedule further appointments.    Equipment Issued: none    Discharge Plan: Patient to continue home program.

## 2020-04-06 NOTE — PROGRESS NOTES
"When opening a documentation only encounter, be sure to enter in \"Chief Complaint\" Forms and in \" Comments\" Title of form, description if needed.    Jaydon Romero is a 84 year old  male  Form received via: Fax  Form now resides in: Provider SANTO Mitchell 3:59 PM April 6, 2020                  "

## 2020-06-03 NOTE — TELEPHONE ENCOUNTER

## 2020-06-03 NOTE — TELEPHONE ENCOUNTER
Verify that the refill encounter hasn't been started Yes    [facility] Family Medicine phone call message- patient requesting a refill:    Full Medication Name: metFORMIN (GLUCOPHAGE) 1000 MG tablet     Dose:      Pharmacy confirmed as   [id number] - [place], [place] - [address]  [address]  [facility] [address]  Phone: [phone] Fax: [phone]    [facility] - [place], [place] - [address] [address] [address]  [address] [address]  Phone: [phone] Fax: [phone]    [facility] - [place], [place] - [facility] [address]  Portal to Registered [facility] Sites  [address]  [facility] [address]  Phone: [phone] Fax: [phone]    [facility] - [place], [place] - [address] [place] [address]  Phone: [phone] Fax: [phone]  : Yes    Medication tab checked to see if medication has been sent  No:    Additional Comments: request Rx refill    OK to leave a message on voice mail? Yes    Advised patient refill may take up to 2 business days? Yes    Primary language: English      needed? No    Call taken on Beckie 3, 2020 at 12:44 PM by Orlando Baptiste    Route to [facility] MED REFILL

## 2025-01-31 NOTE — TELEPHONE ENCOUNTER
Request for medication refill:    Date of last visit at clinic: 10/11/2017    Please complete refill if appropriate and CLOSE ENCOUNTER.    Closing the encounter signifies the refill is complete.    If refill has been denied, please complete the smart phrase .smirefuse and route it to the [facility] RN TRIAGE pool to inform the patient and the pharmacy.    Hui Lewis, CMA         07-Feb-2025